# Patient Record
Sex: FEMALE | Race: WHITE | NOT HISPANIC OR LATINO | Employment: FULL TIME | URBAN - NONMETROPOLITAN AREA
[De-identification: names, ages, dates, MRNs, and addresses within clinical notes are randomized per-mention and may not be internally consistent; named-entity substitution may affect disease eponyms.]

---
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
## 2021-02-16 ENCOUNTER — OFFICE VISIT (OUTPATIENT)
Dept: MEDICAL GROUP | Facility: CLINIC | Age: 21
End: 2021-02-16
Payer: COMMERCIAL

## 2021-02-16 VITALS
TEMPERATURE: 99.5 F | RESPIRATION RATE: 18 BRPM | HEIGHT: 62 IN | DIASTOLIC BLOOD PRESSURE: 60 MMHG | SYSTOLIC BLOOD PRESSURE: 98 MMHG | OXYGEN SATURATION: 97 % | HEART RATE: 83 BPM | BODY MASS INDEX: 20.98 KG/M2 | WEIGHT: 114 LBS

## 2021-02-16 DIAGNOSIS — F41.9 ANXIETY: ICD-10-CM

## 2021-02-16 DIAGNOSIS — F32.A MILD DEPRESSION: ICD-10-CM

## 2021-02-16 DIAGNOSIS — F32.0 CURRENT MILD EPISODE OF MAJOR DEPRESSIVE DISORDER, UNSPECIFIED WHETHER RECURRENT (HCC): ICD-10-CM

## 2021-02-16 DIAGNOSIS — Z23 IMMUNIZATION DUE: ICD-10-CM

## 2021-02-16 PROBLEM — R11.0 NAUSEA: Status: RESOLVED | Noted: 2020-10-06 | Resolved: 2021-02-16

## 2021-02-16 PROBLEM — R63.4 WEIGHT LOSS: Status: RESOLVED | Noted: 2020-12-21 | Resolved: 2021-02-16

## 2021-02-16 PROBLEM — N89.8 VAGINAL DISCHARGE: Status: RESOLVED | Noted: 2020-10-28 | Resolved: 2021-02-16

## 2021-02-16 PROBLEM — N92.6 IRREGULAR PERIODS: Status: RESOLVED | Noted: 2020-12-21 | Resolved: 2021-02-16

## 2021-02-16 PROBLEM — L60.0 INGROWN TOENAIL: Status: RESOLVED | Noted: 2020-10-06 | Resolved: 2021-02-16

## 2021-02-16 PROCEDURE — 90621 MENB-FHBP VACC 2/3 DOSE IM: CPT | Performed by: PHYSICIAN ASSISTANT

## 2021-02-16 PROCEDURE — 99212 OFFICE O/P EST SF 10 MIN: CPT | Mod: 25 | Performed by: PHYSICIAN ASSISTANT

## 2021-02-16 PROCEDURE — 90471 IMMUNIZATION ADMIN: CPT | Performed by: PHYSICIAN ASSISTANT

## 2021-02-16 RX ORDER — FLUOXETINE 10 MG/1
10 CAPSULE ORAL DAILY
Qty: 30 CAPSULE | Refills: 1 | Status: SHIPPED | OUTPATIENT
Start: 2021-02-16 | End: 2021-03-02 | Stop reason: SDUPTHER

## 2021-02-16 ASSESSMENT — PATIENT HEALTH QUESTIONNAIRE - PHQ9
CLINICAL INTERPRETATION OF PHQ2 SCORE: 3
SUM OF ALL RESPONSES TO PHQ QUESTIONS 1-9: 9
5. POOR APPETITE OR OVEREATING: 2 - MORE THAN HALF THE DAYS

## 2021-02-16 ASSESSMENT — FIBROSIS 4 INDEX: FIB4 SCORE: 0.37

## 2021-02-16 NOTE — PROGRESS NOTES
Chief Complaint   Patient presents with   • Anxiety     at work she feels overwhelmed and emotional.        HISTORY OF PRESENT ILLNESS: Patient is a 20 y.o. female established patient who presents today to discuss the following issues:    Anxiety  This is a chronic condition for this patient.  She states that it is started to increase in severity and she frequently feels emotional and feels like hiding in her house.  She has seen a counselor in the past for anxiety and depression but has never been put on any medications.  She is willing to see a counselor again but is requesting to try medications in addition.  We will start her on a trial of Fluoxitine 10 mg.  We will follow-up with her in 2 weeks to assess the effectiveness of this medication.  We will decide at that time if dosage needs to be adjusted.  She is going to try and go back and see the counselor that she was seeing before.  If she cannot get back in with her a referral will be placed for psychology for counseling.    Current mild episode of major depressive disorder (HCC)  Patient is currently having mild depression symptoms.  These are in addition to her anxiety.  We are starting her on a trial of fluoxetine 10 mg daily.  We will follow-up with her in 2 weeks for reevaluation.      Patient Active Problem List    Diagnosis Date Noted   • Hair loss 12/21/2020   • Routine screening for STI (sexually transmitted infection) 10/06/2020   • Abnormal WBC count 06/16/2020   • Anxiety 06/04/2020   • Current mild episode of major depressive disorder (HCC) 06/04/2020   • Well adult exam 01/02/2020   • Encounter for surveillance of implantable subdermal contraceptive 07/10/2017       Allergies:Patient has no known allergies.    Current Outpatient Medications   Medication Sig Dispense Refill   • FLUoxetine (PROZAC) 10 MG Cap Take 1 capsule by mouth every day. 30 capsule 1   • etonogestrel (NEXPLANON) 68 MG Implant implant Inject 1 Each as instructed Once.       No  current facility-administered medications for this visit.       Office Visit on 10/28/2020   Component Date Value Ref Range Status   • POC Color 10/28/2020 Yellow  Negative Final   • POC Appearance 10/28/2020 Cloudy  Negative Final   • POC Leukocyte Esterase 10/28/2020 Small  Negative Final   • POC Nitrites 10/28/2020 Neg  Negative Final   • POC Urobiligen 10/28/2020 1.0  Negative (0.2) mg/dL Final   • POC Protein 10/28/2020 30  Negative mg/dL Final   • POC Urine PH 10/28/2020 6.0  5.0 - 8.0 Final   • POC Blood 10/28/2020 Neg  Negative Final   • POC Specific Gravity 10/28/2020 1.025  <1.005 - >1.030 Final   • POC Ketones 10/28/2020 160  Negative mg/dL Final   • POC Bilirubin 10/28/2020 Small  Negative mg/dL Final   • POC Glucose 10/28/2020 Neg  Negative mg/dL Final   Hospital Outpatient Visit on 10/07/2020   Component Date Value Ref Range Status   • C. trachomatis by PCR 10/07/2020 Negative  Negative Final   • N. gonorrhoeae by PCR 10/07/2020 Negative  Negative Final   • Source 10/07/2020 Genital   Final   ]    The ASCVD Risk score (Hollenberg TOBY Jr., et al., 2013) failed to calculate for the following reasons:    The 2013 ASCVD risk score is only valid for ages 40 to 79    Social History     Tobacco Use   • Smoking status: Former Smoker     Packs/day: 1.00     Years: 2.00     Pack years: 2.00     Types: Cigarettes     Start date:      Quit date:      Years since quittin.1   • Smokeless tobacco: Never Used   Substance Use Topics   • Alcohol use: No     Alcohol/week: 0.0 oz   • Drug use: Yes     Frequency: 5.0 times per week     Types: Marijuana, Inhaled     Comment: vape and smoke       Family Status   Relation Name Status   • Mo  Alive        33 in ,  depression   • Fa unknown Alive        43 in    • MGFa  Alive   • Bro  Alive   • MAunt  Alive   • MGMo  Alive        thyroid problems.    • Bro  Alive     Family History   Problem Relation Age of Onset   • Other Mother         migraine headaches   •  Migraines Mother    • Diabetes Maternal Grandfather    • No Known Problems Brother    • Psychiatric Illness Maternal Aunt    • Cancer Maternal Grandmother    • Thyroid Maternal Grandmother    • No Known Problems Brother        No LMP recorded. Patient has had an implant.    Health Maintenance Summary                IMM INFLUENZA Postponed 6/30/2021 Originally 9/1/2020. Patient Refused    IMM MENINGOCOCCAL B VACCINE HEALTHY PATIENTS AGED 16 TO 23 Next Due 8/16/2021      Done 2/16/2021 Imm Admin: MENING VAC SERO B 2-3 DOSE SCHED IM (TRUMENBA)    CHLAMYDIA SCREENING Next Due 10/7/2021      Done 10/7/2020 CHLAMYDIA/GC PCR URINE OR SWAB    IMM DTaP/Tdap/Td Vaccine Next Due 11/5/2022      Done 11/5/2012 Imm Admin: Tdap Vaccine     Patient has more history with this topic...           Review of Systems:   Constitutional: Negative for fever, chills, weight change, fatigue, loss of appetite.  HNT: Negative for nosebleeds, congestion, odynophagia, sore throat or changes in taste.    Eyes: Negative for vision changes.   Ears: Negative for recent changes in hearing, pain or discharge.  Neck: Negative for pain, swelling, lumps or goiter.  Respiratory: Negative for cough, sputum production, shortness of breath and wheezing.    Cardiovascular: Negative for chest pain, palpitations, orthopnea and leg swelling.   Gastrointestinal: Negative for constipation, diarrhea, heartburn, dysphagia, nausea, vomiting or abdominal pain.   Genitourinary: Negative for dysuria, urgency and frequency.   Musculoskeletal: Negative for myalgias, joint pain, and back pain.  Skin: Negative for skin, hair or nail changes, rash, itching.   Neurological: Negative for dizziness, tingling, tremors, sensory change, gait/coordination changes, focal weakness and headaches.   Endo/Heme/Allergies: Does not bruise/bleed easily.   Psychiatric/Behavioral: Positive for anxiety and depression.  Negative for suicidal ideas and memory loss.  The patient does not have  "insomnia.        Exam:  BP (!) 98/60 (BP Location: Right arm, Patient Position: Sitting, BP Cuff Size: Adult)   Pulse 83   Temp 37.5 °C (99.5 °F) (Temporal)   Resp 18   Ht 1.575 m (5' 2\")   Wt 51.7 kg (114 lb)   SpO2 97%  Body mass index is 20.85 kg/m².  General:  Well nourished, well developed female. No apparent distress.  Eyes: EOM intact, PERRL, conjunctiva non-injected, sclera non-icteric.  Ears: Phyllis pinnae, external auditory canals, TM pearly gray with normal light reflex bilaterally.  Neck: Supple with no cervical lymphadenopathy, JVD, palpable thyroid nodules or carotid bruits.  Pulmonary: Clear to ausculation bilaterally. Normal effort. No rales, ronchi, or wheezing.  Cardiovascular: Regular rate and rhythm without murmur, rub or gallop.   Extremities: Full range of motion. Warm and well perfused with no edema.  Skin: Intact with no obvious rashes or lesions.  Neuro: Cranial nerves I-XII intact.  Psych: Alert and oriented x 3.  Appropriately dressed. Mood and affect appropriate.      Assessment/Plan:  1. Anxiety  FLUoxetine (PROZAC) 10 MG Cap   2. Current mild episode of major depressive disorder, unspecified whether recurrent (HCC)  FLUoxetine (PROZAC) 10 MG Cap   3. Immunization due  Meningococcal (IM) Group B   4. Mild depression (HCC)         Reviewed risks and benefits of treatment plan. Patient verbally agrees to plan of care.     Return in about 2 weeks (around 3/2/2021) for f/u medication management.    Please note that this dictation was created using voice recognition software. I have made every reasonable attempt to correct obvious errors, but I expect that there are errors of grammar and possibly content that I did not discover before finalizing the note.  "

## 2021-02-16 NOTE — ASSESSMENT & PLAN NOTE
This is a chronic condition for this patient.  She states that it is started to increase in severity and she frequently feels emotional and feels like hiding in her house.  She has seen a counselor in the past for anxiety and depression but has never been put on any medications.  She is willing to see a counselor again but is requesting to try medications in addition.  We will start her on a trial of Fluoxitine 10 mg.  We will follow-up with her in 2 weeks to assess the effectiveness of this medication.  We will decide at that time if dosage needs to be adjusted.  She is going to try and go back and see the counselor that she was seeing before.  If she cannot get back in with her a referral will be placed for psychology for counseling.

## 2021-02-17 NOTE — ASSESSMENT & PLAN NOTE
Patient is currently having mild depression symptoms.  These are in addition to her anxiety.  We are starting her on a trial of fluoxetine 10 mg daily.  We will follow-up with her in 2 weeks for reevaluation.

## 2021-03-02 ENCOUNTER — OFFICE VISIT (OUTPATIENT)
Dept: MEDICAL GROUP | Facility: CLINIC | Age: 21
End: 2021-03-02
Payer: COMMERCIAL

## 2021-03-02 VITALS
HEIGHT: 62 IN | DIASTOLIC BLOOD PRESSURE: 60 MMHG | TEMPERATURE: 98.6 F | WEIGHT: 114 LBS | HEART RATE: 69 BPM | RESPIRATION RATE: 16 BRPM | OXYGEN SATURATION: 97 % | SYSTOLIC BLOOD PRESSURE: 102 MMHG | BODY MASS INDEX: 20.98 KG/M2

## 2021-03-02 DIAGNOSIS — F41.9 ANXIETY: ICD-10-CM

## 2021-03-02 DIAGNOSIS — Z30.46 ENCOUNTER FOR SURVEILLANCE OF IMPLANTABLE SUBDERMAL CONTRACEPTIVE: ICD-10-CM

## 2021-03-02 DIAGNOSIS — F32.0 CURRENT MILD EPISODE OF MAJOR DEPRESSIVE DISORDER, UNSPECIFIED WHETHER RECURRENT (HCC): ICD-10-CM

## 2021-03-02 PROCEDURE — 99213 OFFICE O/P EST LOW 20 MIN: CPT | Performed by: PHYSICIAN ASSISTANT

## 2021-03-02 RX ORDER — FLUOXETINE 10 MG/1
10 CAPSULE ORAL DAILY
Qty: 90 CAPSULE | Refills: 3 | Status: SHIPPED | OUTPATIENT
Start: 2021-03-02 | End: 2021-06-15

## 2021-03-02 ASSESSMENT — FIBROSIS 4 INDEX: FIB4 SCORE: 0.37

## 2021-03-02 NOTE — PROGRESS NOTES
Chief Complaint   Patient presents with   • Medication Management       HISTORY OF PRESENT ILLNESS: Patient is a 20 y.o. female established patient who presents today to discuss the following issues:    Encounter for surveillance of implantable subdermal contraceptive  Patient currently has a Nexplanon implant for birthcontrol. She states that she is having irregular bleeding and would like to try a different form of birthcontrol. Patient has been on depo-provera in the past with good success. Would like a referral to GYN to discuss options and have the Nexplanon removed. Referral has been placed today.    Current mild episode of major depressive disorder (HCC)  Patient doing well on the fluoxitine. She is still trying to get an appointment to see her counselor that she was seeing previously. She states that she does not need a referral currently but will let us know if she needs to establish with a different provider. She will continue taking this medication as prescribed. We will follow up in 6 months or sooner if she needs.    Anxiety  Patient states that the fluoxetine is working for her. She is still trying to get an appointment with her previous counselor but that she is having better control of her symptoms. She will continue to try to get an appointment and will continue taking this medication as prescribed. She will let us know if they require a new referral. We will see her back in 6 months or sooner if she needs.      Patient Active Problem List    Diagnosis Date Noted   • Encounter for routine gynecological examination with Papanicolaou smear of cervix 03/25/2021   • Hair loss 12/21/2020   • Routine screening for STI (sexually transmitted infection) 10/06/2020   • Abnormal WBC count 06/16/2020   • Anxiety 06/04/2020   • Current mild episode of major depressive disorder (HCC) 06/04/2020   • Well adult exam 01/02/2020   • Encounter for surveillance of implantable subdermal contraceptive 07/10/2017        Allergies:Patient has no known allergies.    Current Outpatient Medications   Medication Sig Dispense Refill   • FLUoxetine (PROZAC) 10 MG Cap Take 1 capsule by mouth every day. 90 capsule 3   • etonogestrel (NEXPLANON) 68 MG Implant implant Inject 1 Each as instructed Once.     • terbinafine (LAMISIL) 250 MG Tab TAKE 1 TABLET BY MOUTH ONCE DAILY FOR 7 DAYS THEN HOLD FOR 3 WEEKS. REPEAT MONTHLY FOR 4 MONTHS.       No current facility-administered medications for this visit.       Office Visit on 10/28/2020   Component Date Value Ref Range Status   • POC Color 10/28/2020 Yellow  Negative Final   • POC Appearance 10/28/2020 Cloudy  Negative Final   • POC Leukocyte Esterase 10/28/2020 Small  Negative Final   • POC Nitrites 10/28/2020 Neg  Negative Final   • POC Urobiligen 10/28/2020 1.0  Negative (0.2) mg/dL Final   • POC Protein 10/28/2020 30  Negative mg/dL Final   • POC Urine PH 10/28/2020 6.0  5.0 - 8.0 Final   • POC Blood 10/28/2020 Neg  Negative Final   • POC Specific Gravity 10/28/2020 1.025  <1.005 - >1.030 Final   • POC Ketones 10/28/2020 160  Negative mg/dL Final   • POC Bilirubin 10/28/2020 Small  Negative mg/dL Final   • POC Glucose 10/28/2020 Neg  Negative mg/dL Final   Hospital Outpatient Visit on 10/07/2020   Component Date Value Ref Range Status   • C. trachomatis by PCR 10/07/2020 Negative  Negative Final   • N. gonorrhoeae by PCR 10/07/2020 Negative  Negative Final   • Source 10/07/2020 Genital   Final   ]    The ASCVD Risk score (Young DC Jr., et al., 2013) failed to calculate for the following reasons:    The 2013 ASCVD risk score is only valid for ages 40 to 79    Social History     Tobacco Use   • Smoking status: Former Smoker     Packs/day: 1.00     Years: 2.00     Pack years: 2.00     Types: Cigarettes     Start date:      Quit date: 2020     Years since quittin.2   • Smokeless tobacco: Never Used   Substance Use Topics   • Alcohol use: No     Alcohol/week: 0.0 oz   • Drug use: Yes      Frequency: 5.0 times per week     Types: Marijuana, Inhaled     Comment: vape and smoke       Family Status   Relation Name Status   • Mo  Alive        33 in 2014,  depression   • Fa unknown Alive        43 in 2014   • MGFa  Alive   • Bro  Alive   • MAunt  Alive   • MGMo  Alive        thyroid problems.    • Bro  Alive     Family History   Problem Relation Age of Onset   • Other Mother         migraine headaches   • Migraines Mother    • Diabetes Maternal Grandfather    • No Known Problems Brother    • Psychiatric Illness Maternal Aunt    • Cancer Maternal Grandmother    • Thyroid Maternal Grandmother    • No Known Problems Brother        No LMP recorded. Patient has had an implant.    Health Maintenance Summary                COVID-19 Vaccine Overdue 8/24/2017     IMM MENINGOCOCCAL B VACCINE HEALTHY PATIENTS AGED 16 TO 23 Next Due 8/16/2021      Done 2/16/2021 Imm Admin: MENING VAC SERO B 2-3 DOSE SCHED IM (TRUMENBA)    IMM INFLUENZA Next Due 9/1/2021     CHLAMYDIA SCREENING Next Due 10/7/2021      Done 10/7/2020 CHLAMYDIA/GC PCR URINE OR SWAB    IMM DTaP/Tdap/Td Vaccine Next Due 11/5/2022      Done 11/5/2012 Imm Admin: Tdap Vaccine     Patient has more history with this topic...           Review of Systems:   Constitutional: Negative for fever, chills, weight change, fatigue, loss of appetite.  HNT: Negative for nosebleeds, congestion, odynophagia, sore throat or changes in taste.    Eyes: Negative for vision changes.   Ears: Negative for recent changes in hearing, pain or discharge.  Neck: Negative for pain, swelling, lumps or goiter.  Respiratory: Negative for cough, sputum production, shortness of breath and wheezing.    Cardiovascular: Negative for chest pain, palpitations, orthopnea and leg swelling.   Gastrointestinal: Negative for constipation, diarrhea, heartburn, dysphagia, nausea, vomiting or abdominal pain.   Genitourinary: Negative for dysuria, urgency and frequency. Posititve for irregular  "bleeding.  Musculoskeletal: Negative for myalgias, joint pain, and back pain.  Skin: Negative for skin, hair or nail changes, rash, itching.   Neurological: Negative for dizziness, tingling, tremors, sensory change, gait/coordination changes, focal weakness and headaches.   Endo/Heme/Allergies: Does not bruise/bleed easily.   Psychiatric/Behavioral: Positive for anxiety, depression. Negative for suicidal ideas and memory loss.  The patient does not have insomnia.        Exam:  /60 (BP Location: Right arm, Patient Position: Sitting, BP Cuff Size: Small adult)   Pulse 69   Temp 37 °C (98.6 °F) (Temporal)   Resp 16   Ht 1.575 m (5' 2\")   Wt 51.7 kg (114 lb)   SpO2 97%  Body mass index is 20.85 kg/m².  General:  Well nourished, well developed female. No apparent distress.  Eyes: EOM intact, PERRL, conjunctiva non-injected, sclera non-icteric.  Ears: Phyllis pinnae, external auditory canals, TM pearly gray with normal light reflex bilaterally.  Neck: Supple with no cervical lymphadenopathy, JVD, palpable thyroid nodules or carotid bruits.  Pulmonary: Clear to ausculation bilaterally. Normal effort. No rales, ronchi, or wheezing.  Cardiovascular: Regular rate and rhythm without murmur, rub or gallop.   Abdomen:  Soft, non-distended, non-tender with normal bowel sounds. No CVA tenderness  Extremities: Full range of motion. Warm and well perfused with no edema.  Skin: Intact with no obvious rashes or lesions.  Neuro: Cranial nerves I-XII intact.  Psych: Alert and oriented x 3.  Appropriately dressed. Mood and affect appropriate.      Assessment/Plan:  1. Encounter for surveillance of implantable subdermal contraceptive  REFERRAL TO GYNECOLOGY   2. Anxiety  FLUoxetine (PROZAC) 10 MG Cap   3. Current mild episode of major depressive disorder, unspecified whether recurrent (HCC)  FLUoxetine (PROZAC) 10 MG Cap       Reviewed risks and benefits of treatment plan. Patient verbally agrees to plan of care.     Return in " about 6 months (around 9/2/2021).    Please note that this dictation was created using voice recognition software. I have made every reasonable attempt to correct obvious errors, but I expect that there are errors of grammar and possibly content that I did not discover before finalizing the note.

## 2021-03-25 ENCOUNTER — HOSPITAL ENCOUNTER (OUTPATIENT)
Facility: MEDICAL CENTER | Age: 21
End: 2021-03-25
Attending: PHYSICIAN ASSISTANT
Payer: COMMERCIAL

## 2021-03-25 ENCOUNTER — OFFICE VISIT (OUTPATIENT)
Dept: MEDICAL GROUP | Facility: CLINIC | Age: 21
End: 2021-03-25
Payer: COMMERCIAL

## 2021-03-25 VITALS
WEIGHT: 117 LBS | DIASTOLIC BLOOD PRESSURE: 70 MMHG | OXYGEN SATURATION: 98 % | TEMPERATURE: 98.1 F | BODY MASS INDEX: 20.73 KG/M2 | HEART RATE: 78 BPM | RESPIRATION RATE: 16 BRPM | HEIGHT: 63 IN | SYSTOLIC BLOOD PRESSURE: 110 MMHG

## 2021-03-25 DIAGNOSIS — Z01.419 ENCOUNTER FOR ROUTINE GYNECOLOGICAL EXAMINATION WITH PAPANICOLAOU SMEAR OF CERVIX: ICD-10-CM

## 2021-03-25 PROCEDURE — 88175 CYTOPATH C/V AUTO FLUID REDO: CPT

## 2021-03-25 PROCEDURE — 99408 AUDIT/DAST 15-30 MIN: CPT | Mod: SBIRT | Performed by: PHYSICIAN ASSISTANT

## 2021-03-25 PROCEDURE — 99395 PREV VISIT EST AGE 18-39: CPT | Performed by: PHYSICIAN ASSISTANT

## 2021-03-25 PROCEDURE — 87624 HPV HI-RISK TYP POOLED RSLT: CPT

## 2021-03-25 RX ORDER — TERBINAFINE HYDROCHLORIDE 250 MG/1
TABLET ORAL
COMMUNITY
Start: 2021-03-17 | End: 2021-09-12

## 2021-03-25 ASSESSMENT — FIBROSIS 4 INDEX: FIB4 SCORE: 0.37

## 2021-03-25 ASSESSMENT — LIFESTYLE VARIABLES
DO YOU EVER FORGET THINGS YOU DID WHILE USING ALCOHOL OR DRUGS: NO
DURING THE PAST 12 MONTHS, ON HOW MANY DAYS DID YOU USE ANY TOBACCO OR NICOTINE PRODUCTS: 365
PART A TOTAL SCORE: 732
HAVE YOU EVER GOTTEN IN TROUBLE WHILE YOU WERE USING ALCOHOL OR DRUGS: NO
DURING THE PAST 12 MONTHS, ON HOW MANY DAYS DID YOU USE ANYTHING ELSE TO GET HIGH: 0
DO YOU EVER USE ALCOHOL OR DRUGS TO RELAX, FEEL BETTER ABOUT YOURSELF, OR FIT IN: NO
DO YOU EVER USE ALCOHOL OR DRUGS WHILE YOU ARE BY YOURSELF ALONE: YES
DURING THE PAST 12 MONTHS, ON HOW MANY DAYS DID YOU USE ANY MARIJUANA: 365
DO YOUR FAMILY OR FRIENDS EVER TELL YOU THAT YOU SHOULD CUT DOWN ON YOUR DRINKING OR DRUG USE: NO
HAVE YOU EVER RIDDEN IN A CAR DRIVEN BY SOMEONE WHO WAS HIGH OR HAD BEEN USING ALCOHOL OR DRUGS: NO
DURING THE PAST 12 MONTHS, ON HOW MANY DAYS DID YOU DRINK MORE THAN A FEW SIPS OF BEER, WINE, OR ANY DRINK CONTAINING ALCOHOL: 2

## 2021-03-25 NOTE — PROGRESS NOTES
SUBJECTIVE:   Chief Complaint   Patient presents with   • Gynecologic Exam       20 y.o. female for annual well woman exam with routine gynecologic exam    OB History    Para Term  AB Living   0 0 0 0 0 0   SAB TAB Ectopic Molar Multiple Live Births   0 0 0 0 0 0      Social History     Substance and Sexual Activity   Sexual Activity Not Currently   • Partners: Male   • Birth control/protection: Implant       Last Pap: Never  H/O Abnormal Pap no  Menses every monthly with spotting bleeding.  She reports mild cramping   She denies pelvic pain or dyspareunia. No abnormal vaginal discharge.    No breast tenderness, mass, nipple discharge, changes in size or contour, or abnormal cyclic discomfort.    ROS:    No urinary tract symptoms, no incontinence.   No abdominal pain, change in bowel habits, black or bloody stools.    No unusual headaches, no visual changes, menstrual migraines   No prolonged cough. No dyspnea or chest pain on exertion.  No depression, labile mood, anxiety ,libido changes, insomnia.  No new/concerning skin lesions, concerns.     Exercise: minimal exercise walking  Diet: fairly healthy, but only eats twice a day.    Social History     Tobacco Use   • Smoking status: Former Smoker     Packs/day: 1.00     Years: 2.00     Pack years: 2.00     Types: Cigarettes     Start date:      Quit date:      Years since quittin.2   • Smokeless tobacco: Never Used   Substance Use Topics   • Alcohol use: No     Alcohol/week: 0.0 oz   • Drug use: Yes     Frequency: 5.0 times per week     Types: Marijuana, Inhaled     Comment: vape and smoke       Patient Active Problem List    Diagnosis Date Noted   • Encounter for routine gynecological examination with Papanicolaou smear of cervix 2021   • Hair loss 2020   • Routine screening for STI (sexually transmitted infection) 10/06/2020   • Abnormal WBC count 2020   • Anxiety 2020   • Current mild episode of major depressive  "disorder (HCC) 06/04/2020   • Well adult exam 01/02/2020   • Encounter for surveillance of implantable subdermal contraceptive 07/10/2017       Past Medical History:   Diagnosis Date   • Anxiety    • Depression    • Head ache    • Heart murmur     as a baby   • Ingrown toenail 10/6/2020   • Irregular periods 12/21/2020   • Menarche 8/9/2014   • Nausea 10/6/2020   • Vaginal discharge 10/28/2020   • Weight loss 12/21/2020     No past surgical history on file.      Family History   Problem Relation Age of Onset   • Other Mother         migraine headaches   • Migraines Mother    • Diabetes Maternal Grandfather    • No Known Problems Brother    • Psychiatric Illness Maternal Aunt    • Cancer Maternal Grandmother    • Thyroid Maternal Grandmother    • No Known Problems Brother      Family Status   Relation Name Status   • Mo  Alive        33 in 2014,  depression   • Fa unknown Alive        43 in 2014   • MGFa  Alive   • Bro  Alive   • MAunt  Alive   • MGMo  Alive        thyroid problems.    • Bro  Alive         Current medicines (including changes today)  Current Outpatient Medications   Medication Sig Dispense Refill   • FLUoxetine (PROZAC) 10 MG Cap Take 1 capsule by mouth every day. 90 capsule 3   • etonogestrel (NEXPLANON) 68 MG Implant implant Inject 1 Each as instructed Once.     • terbinafine (LAMISIL) 250 MG Tab TAKE 1 TABLET BY MOUTH ONCE DAILY FOR 7 DAYS THEN HOLD FOR 3 WEEKS. REPEAT MONTHLY FOR 4 MONTHS.       No current facility-administered medications for this visit.           Allergies: Patient has no known allergies.     Preventive Care:  The patient has no Health Maintenance topics of status Overdue, Due On, or Due Soon    OBJECTIVE:   /70 (BP Location: Left arm, Patient Position: Sitting, BP Cuff Size: Adult)   Pulse 78   Temp 36.7 °C (98.1 °F) (Temporal)   Resp 16   Ht 1.6 m (5' 3\")   Wt 53.1 kg (117 lb)   SpO2 98%   BMI 20.73 kg/m²     A chaperone was offered to the patient during today's " exam. Patient declined chaperone.    Gen: Well developed, well nourished in no acute distress.   Skin: Pink, warm, and dry  HEENT: conjunctiva non-injected, sclera non-icteric. EOMs intact.   Nasal mucosa without edema nor erythema. No facial tenderness  Pinna normal. TM pearly gray.   Oral mucous membranes pink and moist with no lesions.  Neck: Supple, trachea midline. No adenopathy or masses in the neck or supraclavicular regions.  Lungs: Effort is normal. Clear to auscultation bilaterally with good excursion.  CV: regular rate and rhythm.  Abdomen: soft, nontender, + BS. No HSM.  No CVAT  Ext: no edema, color normal, vascularity normal, temperature normal  Alert and oriented Eye contact is good, speech goal directed, affect calm     Breast Exam: Performed with instruction during examination. No axillary lymphadenopathy, no skin changes, no dominant masses. No nipple retraction  Pelvic Exam:  Normal external genitalia with no lesions. Normal vaginal mucosa with normal rugation and scant discharge. Cervix with no visible lesions. No cervical motion tenderness. Uterus is normal sized with no masses. No adnexal tenderness or enlargement appreciated. Pap is obtained and the specimen was sent to lab      <ASSESSMENT and PLAN>  1. Encounter for routine gynecological examination with Papanicolaou smear of cervix  - THINPREP PAP WITH HPV; Future    Other orders  - terbinafine (LAMISIL) 250 MG Tab; TAKE 1 TABLET BY MOUTH ONCE DAILY FOR 7 DAYS THEN HOLD FOR 3 WEEKS. REPEAT MONTHLY FOR 4 MONTHS.        Discussed  breast self exam, STD prevention     Follow-up in 3 years for next Gyn exam and 3 years for next Pap.   No follow-ups on file.

## 2021-03-25 NOTE — ASSESSMENT & PLAN NOTE
"Patient requested to have PAP done, even though it is early for a first PAP. Patient states that her previous partner was unfaithful and she is making sure that everything is fine and he \"did not give her anything\".  "

## 2021-03-26 DIAGNOSIS — Z01.419 ENCOUNTER FOR ROUTINE GYNECOLOGICAL EXAMINATION WITH PAPANICOLAOU SMEAR OF CERVIX: ICD-10-CM

## 2021-03-26 LAB
CYTOLOGY REG CYTOL: NORMAL
HPV HR 12 DNA CVX QL NAA+PROBE: NEGATIVE
HPV16 DNA SPEC QL NAA+PROBE: NEGATIVE
HPV18 DNA SPEC QL NAA+PROBE: NEGATIVE
SPECIMEN SOURCE: NORMAL

## 2021-04-13 NOTE — ASSESSMENT & PLAN NOTE
Patient doing well on the fluoxitine. She is still trying to get an appointment to see her counselor that she was seeing previously. She states that she does not need a referral currently but will let us know if she needs to establish with a different provider. She will continue taking this medication as prescribed. We will follow up in 6 months or sooner if she needs.

## 2021-04-13 NOTE — ASSESSMENT & PLAN NOTE
Patient currently has a Nexplanon implant for birthcontrol. She states that she is having irregular bleeding and would like to try a different form of birthcontrol. Patient has been on depo-provera in the past with good success. Would like a referral to GYN to discuss options and have the Nexplanon removed. Referral has been placed today.

## 2021-04-13 NOTE — ASSESSMENT & PLAN NOTE
Patient states that the fluoxetine is working for her. She is still trying to get an appointment with her previous counselor but that she is having better control of her symptoms. She will continue to try to get an appointment and will continue taking this medication as prescribed. She will let us know if they require a new referral. We will see her back in 6 months or sooner if she needs.

## 2021-06-15 ENCOUNTER — OFFICE VISIT (OUTPATIENT)
Dept: MEDICAL GROUP | Facility: CLINIC | Age: 21
End: 2021-06-15
Payer: COMMERCIAL

## 2021-06-15 VITALS
TEMPERATURE: 98.4 F | BODY MASS INDEX: 20.2 KG/M2 | HEIGHT: 63 IN | SYSTOLIC BLOOD PRESSURE: 112 MMHG | WEIGHT: 114 LBS | OXYGEN SATURATION: 100 % | DIASTOLIC BLOOD PRESSURE: 60 MMHG | HEART RATE: 87 BPM

## 2021-06-15 DIAGNOSIS — F32.0 CURRENT MILD EPISODE OF MAJOR DEPRESSIVE DISORDER, UNSPECIFIED WHETHER RECURRENT (HCC): ICD-10-CM

## 2021-06-15 DIAGNOSIS — F41.9 ANXIETY: ICD-10-CM

## 2021-06-15 PROCEDURE — 99214 OFFICE O/P EST MOD 30 MIN: CPT | Performed by: PHYSICIAN ASSISTANT

## 2021-06-15 RX ORDER — FLUOXETINE HYDROCHLORIDE 20 MG/1
20 CAPSULE ORAL DAILY
Qty: 60 CAPSULE | Refills: 1 | Status: SHIPPED | OUTPATIENT
Start: 2021-06-15 | End: 2021-09-12

## 2021-06-15 ASSESSMENT — ANXIETY QUESTIONNAIRES
GAD7 TOTAL SCORE: 9
2. NOT BEING ABLE TO STOP OR CONTROL WORRYING: MORE THAN HALF THE DAYS
5. BEING SO RESTLESS THAT IT IS HARD TO SIT STILL: SEVERAL DAYS
IF YOU CHECKED OFF ANY PROBLEMS ON THIS QUESTIONNAIRE, HOW DIFFICULT HAVE THESE PROBLEMS MADE IT FOR YOU TO DO YOUR WORK, TAKE CARE OF THINGS AT HOME, OR GET ALONG WITH OTHER PEOPLE: SOMEWHAT DIFFICULT
1. FEELING NERVOUS, ANXIOUS, OR ON EDGE: SEVERAL DAYS
6. BECOMING EASILY ANNOYED OR IRRITABLE: SEVERAL DAYS
3. WORRYING TOO MUCH ABOUT DIFFERENT THINGS: MORE THAN HALF THE DAYS
7. FEELING AFRAID AS IF SOMETHING AWFUL MIGHT HAPPEN: SEVERAL DAYS
4. TROUBLE RELAXING: SEVERAL DAYS

## 2021-06-15 ASSESSMENT — PATIENT HEALTH QUESTIONNAIRE - PHQ9
SUM OF ALL RESPONSES TO PHQ QUESTIONS 1-9: 13
5. POOR APPETITE OR OVEREATING: 3 - NEARLY EVERY DAY
CLINICAL INTERPRETATION OF PHQ2 SCORE: 3

## 2021-06-15 ASSESSMENT — FIBROSIS 4 INDEX: FIB4 SCORE: 0.37

## 2021-06-16 NOTE — ASSESSMENT & PLAN NOTE
Patient was started on fluoxetine 10 mg a few months ago. She states that she was only noticing a slight difference when taking it. She was supposed to follow up 2 weeks after starting the medication for dose adjustment but she failed to make that appointment. She states that she missed a dose and was not sure what to do so she quit taking the medication.  She is here today with a recurrence of her symptoms. We will start her on 20 mg fluoxetine since she was able to handle the 10 mg without an increase in her symptoms. She has an appointment scheduled for 2 weeks for reevaluation.

## 2021-07-01 ENCOUNTER — TELEPHONE (OUTPATIENT)
Dept: MEDICAL GROUP | Facility: CLINIC | Age: 21
End: 2021-07-01

## 2021-07-01 NOTE — TELEPHONE ENCOUNTER
Phone Number Called: [phone] (home)     Call outcome: Number disconnected    Message: No Show Policy was not reviewed    Brandie Hunter No Showed a established apt on 7/1/21 with Wendy Hillman P.A.-C..     NO Brandie Hunter was reminded of appointment.

## 2021-09-12 ENCOUNTER — OFFICE VISIT (OUTPATIENT)
Dept: URGENT CARE | Facility: PHYSICIAN GROUP | Age: 21
End: 2021-09-12
Payer: COMMERCIAL

## 2021-09-12 ENCOUNTER — APPOINTMENT (OUTPATIENT)
Dept: RADIOLOGY | Facility: IMAGING CENTER | Age: 21
End: 2021-09-12
Attending: PHYSICIAN ASSISTANT
Payer: COMMERCIAL

## 2021-09-12 VITALS
DIASTOLIC BLOOD PRESSURE: 60 MMHG | HEIGHT: 63 IN | TEMPERATURE: 97.5 F | BODY MASS INDEX: 22.5 KG/M2 | OXYGEN SATURATION: 95 % | WEIGHT: 127 LBS | HEART RATE: 100 BPM | SYSTOLIC BLOOD PRESSURE: 98 MMHG

## 2021-09-12 DIAGNOSIS — R10.30 LOWER ABDOMINAL PAIN: ICD-10-CM

## 2021-09-12 DIAGNOSIS — R11.2 NAUSEA VOMITING AND DIARRHEA: ICD-10-CM

## 2021-09-12 DIAGNOSIS — R19.7 NAUSEA VOMITING AND DIARRHEA: ICD-10-CM

## 2021-09-12 PROCEDURE — 99213 OFFICE O/P EST LOW 20 MIN: CPT | Performed by: PHYSICIAN ASSISTANT

## 2021-09-12 PROCEDURE — 74019 RADEX ABDOMEN 2 VIEWS: CPT | Mod: TC,FY | Performed by: PHYSICIAN ASSISTANT

## 2021-09-12 ASSESSMENT — FIBROSIS 4 INDEX: FIB4 SCORE: 0.39

## 2021-09-12 NOTE — PROGRESS NOTES
Chief Complaint   Patient presents with   • Abdominal Pain     x 3 days, tenderness lower area.       HISTORY OF PRESENT ILLNESS: Patient is a 21 y.o. female who presents today for the following:       Patient comes in for evaluation of lower abdominal pain for the past 3 days.  She has had watery stool, nausea, vomiting.  She has a Nexplanon in place.  She denies fever, chills, body aches.    Patient Active Problem List    Diagnosis Date Noted   • Encounter for routine gynecological examination with Papanicolaou smear of cervix 03/25/2021   • Hair loss 12/21/2020   • Routine screening for STI (sexually transmitted infection) 10/06/2020   • Abnormal WBC count 06/16/2020   • Anxiety 06/04/2020   • Current mild episode of major depressive disorder (HCC) 06/04/2020   • Well adult exam 01/02/2020   • Encounter for surveillance of implantable subdermal contraceptive 07/10/2017       Allergies:Patient has no known allergies.    Current Outpatient Medications Ordered in Epic   Medication Sig Dispense Refill   • FLUoxetine (PROZAC) 20 MG Cap Take 1 capsule by mouth every day. (Patient not taking: Reported on 9/12/2021) 60 capsule 1   • terbinafine (LAMISIL) 250 MG Tab TAKE 1 TABLET BY MOUTH ONCE DAILY FOR 7 DAYS THEN HOLD FOR 3 WEEKS. REPEAT MONTHLY FOR 4 MONTHS. (Patient not taking: Reported on 9/12/2021)     • etonogestrel (NEXPLANON) 68 MG Implant implant Inject 1 Each as instructed Once. (Patient not taking: Reported on 9/12/2021)       No current [facility]-ordered facility-administered medications on file.       Past Medical History:   Diagnosis Date   • Anxiety    • Depression    • Head ache    • Heart murmur     as a baby   • Ingrown toenail 10/6/2020   • Irregular periods 12/21/2020   • Menarche 8/9/2014   • Nausea 10/6/2020   • Vaginal discharge 10/28/2020   • Weight loss 12/21/2020       Social History     Tobacco Use   • Smoking status: Former Smoker     Packs/day: 1.00     Years: 2.00     Pack years: 2.00     Types:  "Cigarettes     Start date:      Quit date: 2020     Years since quittin.6   • Smokeless tobacco: Never Used   Vaping Use   • Vaping Use: Every day   • Start date: 2017   • Substances: Nicotine, THC, Flavoring, Nicotine - 50 mg?   • Devices: Refillable tank   Substance Use Topics   • Alcohol use: No     Alcohol/week: 0.0 oz   • Drug use: Yes     Frequency: 5.0 times per week     Types: Marijuana, Inhaled     Comment: vape and smoke       Family Status   Relation Name Status   • Mo  Alive        33 in ,  depression   • Fa unknown Alive        43 in    • MGFa  Alive   • Bro  Alive   • MAunt  Alive   • MGMo  Alive        thyroid problems.    • Bro  Alive     Family History   Problem Relation Age of Onset   • Other Mother         migraine headaches   • Migraines Mother    • Diabetes Maternal Grandfather    • No Known Problems Brother    • Psychiatric Illness Maternal Aunt    • Cancer Maternal Grandmother    • Thyroid Maternal Grandmother    • No Known Problems Brother        Review of Systems:   Pertinent systems reviewed with the patient.      Exam:  BP (!) 98/60 (BP Location: Left arm, Patient Position: Sitting, BP Cuff Size: Adult)   Pulse 100   Temp 36.4 °C (97.5 °F) (Temporal)   Ht 1.6 m (5' 3\")   Wt 57.6 kg (127 lb)   SpO2 95%   General: Well developed, well nourished. No distress.    Eye: PERRL/EOMI; conjunctivae clear, lids normal.  ENMT: Lips without lesions, MMM. Oropharynx is clear. Bilateral TMs are within normal limits.  Pulmonary: Unlabored respiratory effort. Lungs clear to auscultation, no wheezes, no rhonchi.    Cardiovascular: Regular rate and rhythm without murmur.   Neurologic: Grossly nonfocal. No facial asymmetry noted.  Lymph: No cervical lymphadenopathy noted.  Skin: Warm, dry, good turgor. No rashes in visible areas.   Psych: Normal mood. Alert and oriented to person, place and time.    UA, small blood, trace protein, otherwise negative  Urine hCG: Negative    2 view " abdomen, per radiology:  Normal two views of the abdomen.    Assessment/Plan:  [place] diet.  Drink plenty fluids.  Discussed appropriate over-the-counter symptomatic medication, and when to return to clinic. Follow up for worsening or persistent symptoms.  1. Lower abdominal pain  [id number] VIEWS   2. Nausea vomiting and diarrhea

## 2021-12-01 ENCOUNTER — OFFICE VISIT (OUTPATIENT)
Dept: MEDICAL GROUP | Facility: CLINIC | Age: 21
End: 2021-12-01
Payer: COMMERCIAL

## 2021-12-01 VITALS
BODY MASS INDEX: 22.15 KG/M2 | HEIGHT: 63 IN | OXYGEN SATURATION: 96 % | HEART RATE: 84 BPM | DIASTOLIC BLOOD PRESSURE: 60 MMHG | TEMPERATURE: 98.1 F | RESPIRATION RATE: 16 BRPM | SYSTOLIC BLOOD PRESSURE: 108 MMHG | WEIGHT: 125 LBS

## 2021-12-01 DIAGNOSIS — J30.89 ENVIRONMENTAL AND SEASONAL ALLERGIES: ICD-10-CM

## 2021-12-01 DIAGNOSIS — R09.81 NASAL CONGESTION: ICD-10-CM

## 2021-12-01 PROCEDURE — 99213 OFFICE O/P EST LOW 20 MIN: CPT | Performed by: PHYSICIAN ASSISTANT

## 2021-12-01 ASSESSMENT — FIBROSIS 4 INDEX: FIB4 SCORE: 0.39

## 2021-12-02 NOTE — PATIENT INSTRUCTIONS
Vitamin D3 1000 units daily  Zyrtec or cetirizine - 1 tablet nightly for allergies  Saline nasal spray for moisture

## 2021-12-15 ASSESSMENT — ENCOUNTER SYMPTOMS
CARDIOVASCULAR NEGATIVE: 1
SORE THROAT: 0
MUSCULOSKELETAL NEGATIVE: 1
WHEEZING: 0
SINUS PAIN: 1
COUGH: 0
SPUTUM PRODUCTION: 0
SHORTNESS OF BREATH: 0
EYES NEGATIVE: 1
CONSTITUTIONAL NEGATIVE: 1
RESPIRATORY NEGATIVE: 1
DEPRESSION: 1
GASTROINTESTINAL NEGATIVE: 1
NEUROLOGICAL NEGATIVE: 1

## 2021-12-15 ASSESSMENT — VISUAL ACUITY: OU: 1

## 2021-12-15 NOTE — PROGRESS NOTES
Subjective   Brandie Hunter is a 21 y.o. female who presents with Sinus Problem, Nevus (on back ), and Requesting Labs (thyroid )    1. Environmental and seasonal allergies  This is a chronic condition for this patient.  She currently only takes an occasional Benadryl to try and help control her symptoms.  Most of the time she tries to suffer through them. I have recommended that she try cetirizine 1 tablet nightly for her allergy symptoms.  She is having sinus congestion but her mucus is clear.  It is most likely allergic in nature.  I have also recommended that she use saline nasal spray for moisture.  I have recommended that she spray her nasal passages and wait 5 minutes before blowing her nose.  This gives the saline spray a chance to soften up secretions so that she can more easily blow them out.  She will follow-up with us in 1 week should her symptoms not improve or worsen.  Otherwise we will follow-up with her in 3 months.    2. Nasal congestion  Chronic congestion secondary to seasonal and environmental allergies.  I have recommended that she take one of the long acting antihistamines.  She was concerned about drowsiness but I have assured her that these medications are nondrowsy.  She is willing to try them in follow-up.    Past Medical History:  No date: Anxiety  No date: Depression  No date: Head ache  No date: Heart murmur      Comment:  as a baby  10/6/2020: Ingrown toenail  2020: Irregular periods  2014: Menarche  10/6/2020: Nausea  10/28/2020: Vaginal discharge  2020: Weight loss  History reviewed. No pertinent surgical history.  Social History    Tobacco Use      Smoking status: Former Smoker        Packs/day: 1.00        Years: 2.00        Pack years: 2        Types: Cigarettes        Start date:         Quit date: 2020        Years since quittin.9      Smokeless tobacco: Never Used    Vaping Use      Vaping Use: Every day        Start date: 2017        Substances:  Nicotine, THC, Flavoring, Nicotine - 50 mg?        Devices: Refillable tank    Alcohol use: No      Alcohol/week: 0.0 oz    Drug use: Yes      Frequency: 5.0 times per week      Types: Marijuana, Inhaled      Comment: vape and smoke    Review of patient's family history indicates:  Problem: Other      Relation: Mother          Age of Onset: (Not Specified)          Comment: migraine headaches  Problem: Migraines      Relation: Mother          Age of Onset: (Not Specified)  Problem: Diabetes      Relation: Maternal Grandfather          Age of Onset: (Not Specified)  Problem: No Known Problems      Relation: Brother          Age of Onset: (Not Specified)  Problem: Psychiatric Illness      Relation: Maternal Aunt          Age of Onset: (Not Specified)  Problem: Cancer      Relation: Maternal Grandmother          Age of Onset: (Not Specified)  Problem: Thyroid      Relation: Maternal Grandmother          Age of Onset: (Not Specified)  Problem: No Known Problems      Relation: Brother          Age of Onset: (Not Specified)      No current outpatient medications on file.    Patient was instructed on the use of medications, either prescriptions or OTC and informed on when the appropriate follow up time period should be. In addition, patient was also instructed that should any acute worsening occur that they should notify this clinic asap or call 911.      Review of Systems   Constitutional: Negative.    HENT: Positive for congestion, nosebleeds and sinus pain. Negative for ear pain and sore throat.    Eyes: Negative.    Respiratory: Negative.  Negative for cough, sputum production, shortness of breath and wheezing.    Cardiovascular: Negative.    Gastrointestinal: Negative.    Genitourinary: Negative.    Musculoskeletal: Negative.    Skin: Negative.    Neurological: Negative.    Endo/Heme/Allergies: Negative.    Psychiatric/Behavioral: Positive for depression.     Objective     /60 (BP Location: Right arm, Patient  "Position: Sitting, BP Cuff Size: Adult)   Pulse 84   Temp 36.7 °C (98.1 °F) (Temporal)   Resp 16   Ht 1.6 m (5' 3\") Comment: obtained from chart  Wt 56.7 kg (125 lb) Comment: with shoes on  LMP 11/10/2021 (Approximate)   SpO2 96%   BMI 22.14 kg/m²      Physical Exam  Vitals and nursing note reviewed.   Constitutional:       Appearance: Normal appearance. She is well-developed and well-groomed.   HENT:      Head: Normocephalic and atraumatic.      Nose: Mucosal edema, congestion and rhinorrhea present. Rhinorrhea is clear.      Right Turbinates: Enlarged and pale.      Left Turbinates: Enlarged and pale.      Mouth/Throat:      Lips: Pink. No lesions.      Mouth: Mucous membranes are moist.   Eyes:      General: Lids are normal. Vision grossly intact. Gaze aligned appropriately.      Extraocular Movements: Extraocular movements intact.      Conjunctiva/sclera: Conjunctivae normal.      Pupils: Pupils are equal, round, and reactive to light.   Neck:      Thyroid: No thyromegaly.      Vascular: No carotid bruit or JVD.      Trachea: Trachea and phonation normal.   Cardiovascular:      Rate and Rhythm: Normal rate and regular rhythm.      Heart sounds: Normal heart sounds. No murmur heard.  No friction rub. No gallop.    Pulmonary:      Effort: Pulmonary effort is normal.      Breath sounds: Normal breath sounds. No wheezing, rhonchi or rales.   Musculoskeletal:         General: Normal range of motion.      Cervical back: Normal range of motion and neck supple.      Right lower leg: No edema.      Left lower leg: No edema.   Lymphadenopathy:      Cervical: No cervical adenopathy.   Skin:     General: Skin is warm and dry.      Capillary Refill: Capillary refill takes less than 2 seconds.      Findings: No lesion or rash.   Neurological:      Mental Status: She is alert and oriented to person, place, and time.      Cranial Nerves: Cranial nerves are intact.   Psychiatric:         Attention and Perception: " Attention and perception normal.         Mood and Affect: Affect normal. Mood is depressed.         Speech: Speech normal.         Behavior: Behavior normal. Behavior is cooperative.         Thought Content: Thought content normal.         Judgment: Judgment normal.       Assessment & Plan      1. Environmental and seasonal allergies    2. Nasal congestion

## 2021-12-22 ENCOUNTER — HOSPITAL ENCOUNTER (OUTPATIENT)
Facility: MEDICAL CENTER | Age: 21
End: 2021-12-22
Attending: PHYSICIAN ASSISTANT
Payer: COMMERCIAL

## 2021-12-22 ENCOUNTER — OFFICE VISIT (OUTPATIENT)
Dept: MEDICAL GROUP | Facility: CLINIC | Age: 21
End: 2021-12-22
Payer: COMMERCIAL

## 2021-12-22 VITALS
HEART RATE: 102 BPM | BODY MASS INDEX: 21.65 KG/M2 | HEIGHT: 63 IN | DIASTOLIC BLOOD PRESSURE: 70 MMHG | OXYGEN SATURATION: 96 % | TEMPERATURE: 98.7 F | WEIGHT: 122.2 LBS | SYSTOLIC BLOOD PRESSURE: 114 MMHG

## 2021-12-22 DIAGNOSIS — Z72.51 HIGH RISK HETEROSEXUAL BEHAVIOR: ICD-10-CM

## 2021-12-22 DIAGNOSIS — N89.8 VAGINAL DISCHARGE: ICD-10-CM

## 2021-12-22 DIAGNOSIS — R05.9 COUGH: ICD-10-CM

## 2021-12-22 PROCEDURE — 87491 CHLMYD TRACH DNA AMP PROBE: CPT

## 2021-12-22 PROCEDURE — 99213 OFFICE O/P EST LOW 20 MIN: CPT | Performed by: PHYSICIAN ASSISTANT

## 2021-12-22 PROCEDURE — 87510 GARDNER VAG DNA DIR PROBE: CPT

## 2021-12-22 PROCEDURE — 87480 CANDIDA DNA DIR PROBE: CPT

## 2021-12-22 PROCEDURE — 87660 TRICHOMONAS VAGIN DIR PROBE: CPT

## 2021-12-22 PROCEDURE — 87591 N.GONORRHOEAE DNA AMP PROB: CPT

## 2021-12-22 ASSESSMENT — ENCOUNTER SYMPTOMS
COUGH: 1
CONSTITUTIONAL NEGATIVE: 1
HEMOPTYSIS: 0
NERVOUS/ANXIOUS: 1
SHORTNESS OF BREATH: 0
CARDIOVASCULAR NEGATIVE: 1
HALLUCINATIONS: 0
SPUTUM PRODUCTION: 0
MEMORY LOSS: 0
EYES NEGATIVE: 1
FLANK PAIN: 0
DEPRESSION: 0
WHEEZING: 0
INSOMNIA: 0
GASTROINTESTINAL NEGATIVE: 1
MUSCULOSKELETAL NEGATIVE: 1
NEUROLOGICAL NEGATIVE: 1

## 2021-12-22 ASSESSMENT — LIFESTYLE VARIABLES: SUBSTANCE_ABUSE: 0

## 2021-12-22 ASSESSMENT — VISUAL ACUITY: OU: 1

## 2021-12-22 ASSESSMENT — FIBROSIS 4 INDEX: FIB4 SCORE: 0.39

## 2021-12-22 NOTE — ASSESSMENT & PLAN NOTE
Has been happening for a few months. Notices it only when she is smoking marajuana. Does not wake in the morning coughing and does not cough any other time. Recommend stop smoking marijuana and switch to edibles if she feels the need to use it.

## 2021-12-23 LAB
AMBIGUOUS DTTM AMBI4: NORMAL
CANDIDA DNA VAG QL PROBE+SIG AMP: NEGATIVE
G VAGINALIS DNA VAG QL PROBE+SIG AMP: POSITIVE
T VAGINALIS DNA VAG QL PROBE+SIG AMP: NEGATIVE

## 2021-12-23 NOTE — PROGRESS NOTES
Subjective   Brandie Hunter is a 21 y.o. female who presents with Cough (pt stated 'coughing up brown stuff' since 2 mo. ago)    1. Cough  Has been happening for a few months. Notices it only when she is smoking marajuana. Does not wake in the morning coughing and does not cough any other time. Recommend stop smoking marijuana and switch to edibles if she feels the need to use it. Lungs are clear to ascultation bilaterally.    2. High risk heterosexual behavior  Patient had an unprotected sexual encounter about a week ago. She states that it was unplanned and unlike her. She is requesting all STI testing just to be safe.   BV+CT/NG/TV NEIL+YEAST CULTURE   HIV AG/AB COMBO ASSAY SCREENING; Future   T.PALLIDUM AB EIA; Future   Chlamydia/GC PCR Urine Or Swab (Clinic Collect - Genital); Future    3. Vaginal discharge  Since the unprotected sexual encounter she has noticed thin, white vaginal discharge, itching and burning in the vaginal area. Vaginal exam shows thin, white, odorless discharge, mild vaginal irritation. No open sores or lesions. No adnexal tenderness. Swabs collected and sent to lab for testing. Will contact patient when results return to initiate treatment if necessary.   BV+CT/NG/TV NEIL+YEAST CULTURE   HIV AG/AB COMBO ASSAY SCREENING; Future   T.PALLIDUM AB EIA; Future   Chlamydia/GC PCR Urine Or Swab (Clinic Collect - Genital); Future    Past Medical History:  No date: Anxiety  No date: Depression  No date: Head ache  No date: Heart murmur      Comment:  as a baby  10/6/2020: Ingrown toenail  2020: Irregular periods  2014: Menarche  10/6/2020: Nausea  10/28/2020: Vaginal discharge  2020: Weight loss  History reviewed. No pertinent surgical history.  Social History    Tobacco Use      Smoking status: Former Smoker        Packs/day: 1.00        Years: 2.00        Pack years: 2        Types: Cigarettes        Start date:         Quit date:         Years since quittin.9       Smokeless tobacco: Never Used    Vaping Use      Vaping Use: Every day        Start date: 1/2/2017        Substances: Nicotine, THC, Flavoring, Nicotine - 50 mg?        Devices: Refillable tank    Alcohol use: No      Alcohol/week: 0.0 oz    Drug use: Yes      Frequency: 5.0 times per week      Types: Marijuana, Inhaled      Comment: vape and smoke    Review of patient's family history indicates:  Problem: Other      Relation: Mother          Age of Onset: (Not Specified)          Comment: migraine headaches  Problem: Migraines      Relation: Mother          Age of Onset: (Not Specified)  Problem: Diabetes      Relation: Maternal Grandfather          Age of Onset: (Not Specified)  Problem: No Known Problems      Relation: Brother          Age of Onset: (Not Specified)  Problem: Psychiatric Illness      Relation: Maternal Aunt          Age of Onset: (Not Specified)  Problem: Cancer      Relation: Maternal Grandmother          Age of Onset: (Not Specified)  Problem: Thyroid      Relation: Maternal Grandmother          Age of Onset: (Not Specified)  Problem: No Known Problems      Relation: Brother          Age of Onset: (Not Specified)      No current outpatient medications on file.    Patient was instructed on the use of medications, either prescriptions or OTC and informed on when the appropriate follow up time period should be. In addition, patient was also instructed that should any acute worsening occur that they should notify this clinic asap or call 911.      Review of Systems   Constitutional: Negative.    HENT: Negative.    Eyes: Negative.    Respiratory: Positive for cough. Negative for hemoptysis, sputum production, shortness of breath and wheezing.    Cardiovascular: Negative.    Gastrointestinal: Negative.    Genitourinary: Negative for dysuria, flank pain, frequency, hematuria and urgency.        Vaginal discharge, itching and burning.   Musculoskeletal: Negative.    Skin: Negative.    Neurological:  "Negative.    Endo/Heme/Allergies: Negative.    Psychiatric/Behavioral: Negative for depression, hallucinations, memory loss, substance abuse and suicidal ideas. The patient is nervous/anxious. The patient does not have insomnia.      Objective     /70 (BP Location: Left arm, Patient Position: Sitting, BP Cuff Size: Adult)   Pulse (!) 102   Temp 37.1 °C (98.7 °F) (Temporal)   Ht 1.6 m (5' 3\")   Wt 55.4 kg (122 lb 3.2 oz)   LMP 12/01/2021 (Approximate)   SpO2 96%   BMI 21.65 kg/m²      Physical Exam  Vitals and nursing note reviewed. Exam conducted with a chaperone present.   Constitutional:       Appearance: Normal appearance. She is well-developed and well-groomed.   HENT:      Head: Normocephalic and atraumatic.      Nose: Nose normal.      Mouth/Throat:      Lips: Pink. No lesions.      Mouth: Mucous membranes are moist.   Eyes:      General: Lids are normal. Vision grossly intact. Gaze aligned appropriately.      Extraocular Movements: Extraocular movements intact.      Conjunctiva/sclera: Conjunctivae normal.      Pupils: Pupils are equal, round, and reactive to light.   Neck:      Thyroid: No thyromegaly.      Vascular: No carotid bruit or JVD.      Trachea: Trachea and phonation normal.   Cardiovascular:      Rate and Rhythm: Normal rate and regular rhythm.      Heart sounds: Normal heart sounds. No murmur heard.  No friction rub. No gallop.    Pulmonary:      Effort: Pulmonary effort is normal.      Breath sounds: Normal breath sounds. No wheezing, rhonchi or rales.   Genitourinary:     Pubic Area: No rash or pubic lice.       Labia:         Right: No rash, tenderness, lesion or injury.         Left: No rash, tenderness, lesion or injury.       Urethra: No prolapse, urethral pain, urethral swelling or urethral lesion.      Vagina: No signs of injury and foreign body. Vaginal discharge and erythema present. No tenderness, bleeding, lesions or prolapsed vaginal walls.      Cervix: Normal.      " Uterus: Normal.       Adnexa: Right adnexa normal and left adnexa normal.      Comments: Minor skin irritation, erythema present on labia.  Musculoskeletal:         General: Normal range of motion.      Cervical back: Normal range of motion and neck supple.      Right lower leg: No edema.      Left lower leg: No edema.   Lymphadenopathy:      Cervical: No cervical adenopathy.   Skin:     General: Skin is warm and dry.      Capillary Refill: Capillary refill takes less than 2 seconds.      Findings: No lesion or rash.   Neurological:      Mental Status: She is alert and oriented to person, place, and time.      Cranial Nerves: Cranial nerves are intact.   Psychiatric:         Attention and Perception: Attention and perception normal.         Mood and Affect: Mood and affect normal.         Speech: Speech normal.         Behavior: Behavior normal. Behavior is cooperative.         Thought Content: Thought content normal.         Judgment: Judgment normal.       Assessment & Plan      1. Cough    2. High risk heterosexual behavior  - BV+CT/NG/TV NEIL+YEAST CULTURE  - HIV AG/AB COMBO ASSAY SCREENING; Future  - T.PALLIDUM AB EIA; Future  - Chlamydia/GC PCR Urine Or Swab (Clinic Collect - Genital); Future    3. Vaginal discharge  - BV+CT/NG/TV NEIL+YEAST CULTURE  - HIV AG/AB COMBO ASSAY SCREENING; Future  - T.PALLIDUM AB EIA; Future  - Chlamydia/GC PCR Urine Or Swab (Clinic Collect - Genital); Future

## 2021-12-24 LAB
C TRACH DNA SPEC QL NAA+PROBE: POSITIVE
N GONORRHOEA DNA SPEC QL NAA+PROBE: NEGATIVE
SPECIMEN SOURCE: ABNORMAL

## 2021-12-26 ENCOUNTER — OFFICE VISIT (OUTPATIENT)
Dept: URGENT CARE | Facility: PHYSICIAN GROUP | Age: 21
End: 2021-12-26
Payer: COMMERCIAL

## 2021-12-26 VITALS
WEIGHT: 124 LBS | BODY MASS INDEX: 22.82 KG/M2 | HEART RATE: 74 BPM | OXYGEN SATURATION: 98 % | RESPIRATION RATE: 12 BRPM | SYSTOLIC BLOOD PRESSURE: 100 MMHG | TEMPERATURE: 97.7 F | DIASTOLIC BLOOD PRESSURE: 78 MMHG | HEIGHT: 62 IN

## 2021-12-26 DIAGNOSIS — A56.02 CHLAMYDIA VAGINITIS/CERVICITIS: ICD-10-CM

## 2021-12-26 DIAGNOSIS — N76.0 GARDNERELLA VAGINALIS INFECTION: ICD-10-CM

## 2021-12-26 DIAGNOSIS — B96.89 GARDNERELLA VAGINALIS INFECTION: ICD-10-CM

## 2021-12-26 DIAGNOSIS — A56.09 CHLAMYDIA VAGINITIS/CERVICITIS: ICD-10-CM

## 2021-12-26 PROCEDURE — 99213 OFFICE O/P EST LOW 20 MIN: CPT | Performed by: FAMILY MEDICINE

## 2021-12-26 RX ORDER — METRONIDAZOLE 500 MG/1
TABLET ORAL
Qty: 14 TABLET | Refills: 0 | Status: SHIPPED | OUTPATIENT
Start: 2021-12-26 | End: 2022-01-02

## 2021-12-26 RX ORDER — AZITHROMYCIN 500 MG/1
TABLET, FILM COATED ORAL
Qty: 2 TABLET | Refills: 0 | Status: SHIPPED | OUTPATIENT
Start: 2021-12-26 | End: 2022-01-12

## 2021-12-26 ASSESSMENT — FIBROSIS 4 INDEX: FIB4 SCORE: 0.39

## 2021-12-26 NOTE — PROGRESS NOTES
Chief Complaint:    Chief Complaint   Patient presents with   • Medication Refill     pt states she was seen and was told she would need antibiotics based on lab result       History of Present Illness:    She had labs done on 21 with other provider, visit and labs reviewed. She was positive for Chlamydia and Gardnerella, but has yet to be treated.      Past Medical History:    Past Medical History:   Diagnosis Date   • Anxiety    • Depression    • Head ache    • Heart murmur     as a baby   • Ingrown toenail 10/6/2020   • Irregular periods 2020   • Menarche 2014   • Nausea 10/6/2020   • Vaginal discharge 10/28/2020   • Weight loss 2020     Past Surgical History:    No past surgical history on file.    Social History:    Social History     Socioeconomic History   • Marital status: Single     Spouse name: Not on file   • Number of children: 0   • Years of education: Not on file   • Highest education level: Some college, no degree   Occupational History   • Occupation: /     Employer: [email or web address]   Tobacco Use   • Smoking status: Former Smoker     Packs/day: 1.00     Years: 2.00     Pack years: 2.00     Types: Cigarettes     Start date:      Quit date: 2020     Years since quittin.9   • Smokeless tobacco: Never Used   • Tobacco comment: [facility]   Vaping Use   • Vaping Use: Every day   • Start date: 2017   • Substances: Nicotine, THC, Flavoring, Nicotine - 50 mg?   • Devices: Refillable tank   Substance and Sexual Activity   • Alcohol use: No     Alcohol/week: 0.0 oz   • Drug use: Yes     Frequency: 5.0 times per week     Types: Marijuana, Inhaled     Comment: vape and smoke   • Sexual activity: Not Currently     Partners: Male     Birth control/protection: Implant   Other Topics Concern   •  Service Not Asked   • Blood Transfusions Not Asked   • Caffeine Concern Not Asked   • Occupational Exposure Not Asked   • Hobby Hazards Not Asked   • Sleep Concern Yes   •  Stress Concern Not Asked   • Weight Concern Not Asked   • Special Diet Not Asked   • Back Care Not Asked   • Exercise Yes   • Bike Helmet Not Asked   • Seat Belt Yes   • Self-Exams Not Asked   • Behavioral problems Not Asked   • Interpersonal relationships Not Asked   • Sad or not enjoying activities Not Asked   • Suicidal thoughts Not Asked   • Poor school performance Not Asked   • Reading difficulties Not Asked   • Speech difficulties Not Asked   • Writing difficulties Not Asked   • Inadequate sleep Not Asked   • Excessive TV viewing Not Asked   • Excessive video game use Not Asked   • Inadequate exercise Not Asked   • Sports related Not Asked   • Poor diet Not Asked   • Family concerns for drug/alcohol abuse Not Asked   • Poor oral hygiene Not Asked   • Bike safety Not Asked   • Family concerns vehicle safety Not Asked   Social History Narrative   • Not on file     Social Determinants of Health     Financial Resource Strain:    • Difficulty of Paying Living Expenses: Not on file   Food Insecurity:    • Worried About Running Out of Food in the Last Year: Not on file   • Ran Out of Food in the Last Year: Not on file   Transportation Needs:    • Lack of Transportation (Medical): Not on file   • Lack of Transportation (Non-Medical): Not on file   Physical Activity:    • Days of Exercise per Week: Not on file   • Minutes of Exercise per Session: Not on file   Stress:    • Feeling of Stress : Not on file   Social Connections:    • Frequency of Communication with Friends and Family: Not on file   • Frequency of Social Gatherings with Friends and Family: Not on file   • Attends Sikh Services: Not on file   • Active Member of Clubs or Organizations: Not on file   • Attends Club or Organization Meetings: Not on file   • Marital Status: Not on file   Intimate Partner Violence:    • Fear of Current or Ex-Partner: Not on file   • Emotionally Abused: Not on file   • Physically Abused: Not on file   • Sexually Abused: Not  "on file   Housing Stability:    • Unable to Pay for Housing in the Last Year: Not on file   • Number of Places Lived in the Last Year: Not on file   • Unstable Housing in the Last Year: Not on file     Family History:    Family History   Problem Relation Age of Onset   • Other Mother         migraine headaches   • Migraines Mother    • Diabetes Maternal Grandfather    • No Known Problems Brother    • Psychiatric Illness Maternal Aunt    • Cancer Maternal Grandmother    • Thyroid Maternal Grandmother    • No Known Problems Brother      Medications:    No current outpatient medications on file prior to visit.     No current facility-administered medications on file prior to visit.     Allergies:    No Known Allergies      Vitals:    Vitals:    21 1447   BP: 100/78   Pulse: 74   Resp: 12   Temp: 36.5 °C (97.7 °F)   SpO2: 98%   Weight: 56.2 kg (124 lb)   Height: 1.575 m (5' 2\")       Physical Exam:    Constitutional: Vital signs reviewed. Appears well-developed and well-nourished. No acute distress.   Eyes: Sclera white, conjunctivae clear.   ENT: External ears normal. Hearing normal.   Neck: Neck supple.   Pulmonary/Chest: Respirations non-labored.   Musculoskeletal: Normal gait. No muscular atrophy or weakness.  Neurological: Alert and oriented to person, place, and time. Muscle tone normal. Coordination normal.   Skin: No rashes or lesions. Warm, dry, normal turgor.  Psychiatric: Normal mood and affect. Behavior is normal. Judgment and thought content normal.       Medical Decision Makin. Chlamydia vaginitis/cervicitis  - azithromycin (ZITHROMAX) 500 MG tablet; 2 TABS BY MOUTH X 1 DOSE.  Dispense: 2 Tablet; Refill: 0    2. Gardnerella vaginalis infection  - metroNIDAZOLE (FLAGYL) 500 MG Tab; 1 TAB BY MOUTH TWICE A DAY X 7 DAYS. NO ALCOHOL USE WITH THIS.  Dispense: 14 Tablet; Refill: 0      Discussed with her DDX, management options, and risks, benefits, and alternatives to treatment plan agreed " upon.    She had labs done on 12/22/21 with other provider, visit and labs reviewed. She was positive for Chlamydia and Gardnerella, but has yet to be treated.    Agreeable to medications prescribed.    Advised no sex x 1 week and notify sexual partner(s) of positive Chlamydia test.    She will return to urgent care if needed.

## 2021-12-29 ENCOUNTER — TELEPHONE (OUTPATIENT)
Dept: MEDICAL GROUP | Facility: CLINIC | Age: 21
End: 2021-12-29

## 2021-12-29 NOTE — TELEPHONE ENCOUNTER
VOICEMAIL  1. Caller Name: [facility]                     Call Back Number: [phone]    2. Message: Avani from the main labs called for a recollect for pt bc the tests that were sent in were swabs and they needed blood work done      3. Patient approves office to leave a detailed voicemail/MyChart message: N\A

## 2021-12-30 ENCOUNTER — TELEPHONE (OUTPATIENT)
Dept: MEDICAL GROUP | Facility: CLINIC | Age: 21
End: 2021-12-30

## 2021-12-30 NOTE — TELEPHONE ENCOUNTER
Phone Number Called: [phone] (home)       Call outcome: Spoke to patient regarding message below.    Message: Called Pt to come back for lab draw for last results to recollect.

## 2022-01-03 ENCOUNTER — HOSPITAL ENCOUNTER (OUTPATIENT)
Facility: MEDICAL CENTER | Age: 22
End: 2022-01-03
Attending: PHYSICIAN ASSISTANT
Payer: COMMERCIAL

## 2022-01-03 ENCOUNTER — NON-PROVIDER VISIT (OUTPATIENT)
Dept: MEDICAL GROUP | Facility: CLINIC | Age: 22
End: 2022-01-03
Payer: COMMERCIAL

## 2022-01-03 PROCEDURE — 86780 TREPONEMA PALLIDUM: CPT

## 2022-01-03 PROCEDURE — 87491 CHLMYD TRACH DNA AMP PROBE: CPT

## 2022-01-03 PROCEDURE — 87591 N.GONORRHOEAE DNA AMP PROB: CPT

## 2022-01-03 PROCEDURE — 87389 HIV-1 AG W/HIV-1&-2 AB AG IA: CPT

## 2022-01-03 PROCEDURE — 36415 COLL VENOUS BLD VENIPUNCTURE: CPT | Performed by: PHYSICIAN ASSISTANT

## 2022-01-04 DIAGNOSIS — N89.8 VAGINAL DISCHARGE: ICD-10-CM

## 2022-01-04 DIAGNOSIS — Z72.51 HIGH RISK HETEROSEXUAL BEHAVIOR: ICD-10-CM

## 2022-01-04 LAB
C TRACH DNA SPEC QL NAA+PROBE: NEGATIVE
HIV 1+2 AB+HIV1 P24 AG SERPL QL IA: NORMAL
N GONORRHOEA DNA SPEC QL NAA+PROBE: NEGATIVE
SPECIMEN SOURCE: NORMAL
TREPONEMA PALLIDUM IGG+IGM AB [PRESENCE] IN SERUM OR PLASMA BY IMMUNOASSAY: NORMAL

## 2022-01-12 ENCOUNTER — OFFICE VISIT (OUTPATIENT)
Dept: MEDICAL GROUP | Facility: CLINIC | Age: 22
End: 2022-01-12
Payer: COMMERCIAL

## 2022-01-12 VITALS
DIASTOLIC BLOOD PRESSURE: 58 MMHG | TEMPERATURE: 99.7 F | BODY MASS INDEX: 22.08 KG/M2 | HEIGHT: 62 IN | WEIGHT: 120 LBS | RESPIRATION RATE: 12 BRPM | OXYGEN SATURATION: 97 % | HEART RATE: 93 BPM | SYSTOLIC BLOOD PRESSURE: 94 MMHG

## 2022-01-12 DIAGNOSIS — R59.9 SWOLLEN LYMPH NODES: ICD-10-CM

## 2022-01-12 DIAGNOSIS — N76.0 BACTERIAL VAGINOSIS: ICD-10-CM

## 2022-01-12 DIAGNOSIS — B96.89 BACTERIAL VAGINOSIS: ICD-10-CM

## 2022-01-12 DIAGNOSIS — R10.9 ABDOMINAL DISCOMFORT: ICD-10-CM

## 2022-01-12 PROCEDURE — 99213 OFFICE O/P EST LOW 20 MIN: CPT | Performed by: PHYSICIAN ASSISTANT

## 2022-01-12 RX ORDER — METRONIDAZOLE 7.5 MG/G
1 GEL VAGINAL
Qty: 45 G | Refills: 0 | Status: SHIPPED | OUTPATIENT
Start: 2022-01-12 | End: 2022-01-17

## 2022-01-12 ASSESSMENT — FIBROSIS 4 INDEX: FIB4 SCORE: 0.39

## 2022-01-12 NOTE — PROGRESS NOTES
"cc:  Yellow eyes    Subjective:     Brandie Hunter is a 21 y.o. female presenting for yellow eyes      Patient presents to the office for yellow eyes Patient states that she had right sided lymph nodes on her neck that have been swollen and she feels it is increasing in size.  She states that she is now having a left side lymph node develop.  She states that there has been some pain. But overall no.  She states she feels pressure when she presses on the nodes.  She denies sore throat and ear pain.  She states that she did have some ear pain around Thanksgiving. She denies having a thyroid issue but states that it does run in the family.  Patient states that she was on a medication for a toe fungus.   She was also on metronidazole.  She is concerned about her liver.  She has had the lymph nodes since December.     Review of systems:  See above.   Denies any symptoms unless previously indicated.        Current Outpatient Medications:   •  metroNIDAZOLE (METROGEL-VAGINAL) 0.75 % Gel, Insert 1 Applicator into the vagina at bedtime for 5 days., Disp: 45 g, Rfl: 0    Allergies, past medical history, past surgical history, family history, social history reviewed and updated    Objective:     Vitals: BP (!) 94/58 (BP Location: Left arm, Patient Position: Sitting, BP Cuff Size: Adult)   Pulse 93   Temp 37.6 °C (99.7 °F) (Temporal)   Resp 12   Ht 1.575 m (5' 2\") Comment: Obtained from last record  Wt 54.4 kg (120 lb) Comment: Obtained with shoes  SpO2 97%   BMI 21.95 kg/m²   General: Alert, pleasant, NAD  EYES:   PERRL, EOMI, no icterus or pallor.  No signs of Jaundice.  Conjunctivae and lids normal.   HENT:  Normocephalic.  External ears normal. Tympanic membranes pearly, opaque.    Neck supple.   Post auricular lymph node right side.  Anterior lymph node left side.   Respiratory: Normal respiratory effort.    Abdomen: Not obese  Skin: Warm, dry, no rashes.  Musculoskeletal: Gait is normal.  Moves all extremities " well.    Extremities: normal range of motion all extremities. .   Neurological: No tremors, sensation grossly intact,  CN2-12 intact.  Psych:  Affect/mood is normal, judgement is good, memory is intact, grooming is appropriate.    Assessment/Plan:     Brandie was seen today for burning eyes.    Diagnoses and all orders for this visit:    Swollen lymph nodes  -     US-SOFT TISSUES OF HEAD - NECK; Future    Patient has previously been on antibiotics.  At this time as there is no improvement in her lymph nodes, we will order an ultrasound of the neck for further evaluation.    Bacterial vaginosis  -     metroNIDAZOLE (METROGEL-VAGINAL) 0.75 % Gel; Insert 1 Applicator into the vagina at bedtime for 5 days.    Patient was given an oral metronidazole.  She did not finish the antibiotic.  Therefore I will order the gel.  She will let me know if there are any problems.    Abdominal discomfort    Believe this is from the metronidazole.  Patient is not wanting any medication at this time but she will let me know in a week if her symptoms have not improved.        No follow-ups on file.    Please note that this dictation was created using voice recognition software. I have made every reasonable attempt to correct obvious errors, but expect that there are errors of grammar and possible content that I did not discover before finalizing note.

## 2022-01-17 ENCOUNTER — HOSPITAL ENCOUNTER (OUTPATIENT)
Dept: RADIOLOGY | Facility: MEDICAL CENTER | Age: 22
End: 2022-01-17
Attending: PHYSICIAN ASSISTANT
Payer: COMMERCIAL

## 2022-01-17 DIAGNOSIS — R59.9 SWOLLEN LYMPH NODES: ICD-10-CM

## 2022-01-17 PROCEDURE — 76536 US EXAM OF HEAD AND NECK: CPT

## 2022-01-20 ENCOUNTER — NON-PROVIDER VISIT (OUTPATIENT)
Dept: MEDICAL GROUP | Facility: CLINIC | Age: 22
End: 2022-01-20
Payer: COMMERCIAL

## 2022-01-20 ENCOUNTER — PATIENT MESSAGE (OUTPATIENT)
Dept: MEDICAL GROUP | Facility: CLINIC | Age: 22
End: 2022-01-20

## 2022-01-20 ENCOUNTER — HOSPITAL ENCOUNTER (OUTPATIENT)
Facility: MEDICAL CENTER | Age: 22
End: 2022-01-20
Attending: PHYSICIAN ASSISTANT
Payer: COMMERCIAL

## 2022-01-20 DIAGNOSIS — Z11.3 ROUTINE SCREENING FOR STI (SEXUALLY TRANSMITTED INFECTION): ICD-10-CM

## 2022-01-20 LAB — AMBIGUOUS DTTM AMBI4: NORMAL

## 2022-01-20 PROCEDURE — 87591 N.GONORRHOEAE DNA AMP PROB: CPT

## 2022-01-20 PROCEDURE — 99000 SPECIMEN HANDLING OFFICE-LAB: CPT | Performed by: PHYSICIAN ASSISTANT

## 2022-01-20 PROCEDURE — 87491 CHLMYD TRACH DNA AMP PROBE: CPT

## 2022-01-24 NOTE — PROGRESS NOTES
Michaelnelsy GERALDINE Dale is a 21 y.o. female here for a non-provider visit for Urine  Collection     If abnormal was an in office provider notified today (if so, indicate provider)? Yes    Routed to PCP? Yes

## 2022-01-28 ENCOUNTER — HOSPITAL ENCOUNTER (OUTPATIENT)
Facility: MEDICAL CENTER | Age: 22
End: 2022-01-28
Attending: PHYSICIAN ASSISTANT
Payer: COMMERCIAL

## 2022-01-28 ENCOUNTER — OFFICE VISIT (OUTPATIENT)
Dept: MEDICAL GROUP | Facility: CLINIC | Age: 22
End: 2022-01-28
Payer: COMMERCIAL

## 2022-01-28 VITALS
WEIGHT: 120 LBS | DIASTOLIC BLOOD PRESSURE: 60 MMHG | TEMPERATURE: 98.8 F | OXYGEN SATURATION: 98 % | SYSTOLIC BLOOD PRESSURE: 108 MMHG | HEART RATE: 63 BPM | HEIGHT: 63 IN | BODY MASS INDEX: 21.26 KG/M2 | RESPIRATION RATE: 16 BRPM

## 2022-01-28 DIAGNOSIS — F33.1 MODERATE EPISODE OF RECURRENT MAJOR DEPRESSIVE DISORDER (HCC): ICD-10-CM

## 2022-01-28 DIAGNOSIS — F41.9 ANXIETY: ICD-10-CM

## 2022-01-28 DIAGNOSIS — M54.2 NECK PAIN: ICD-10-CM

## 2022-01-28 PROBLEM — F32.1 CURRENT MODERATE EPISODE OF MAJOR DEPRESSIVE DISORDER (HCC): Status: ACTIVE | Noted: 2020-06-04

## 2022-01-28 PROCEDURE — 84439 ASSAY OF FREE THYROXINE: CPT

## 2022-01-28 PROCEDURE — 84443 ASSAY THYROID STIM HORMONE: CPT

## 2022-01-28 PROCEDURE — 99214 OFFICE O/P EST MOD 30 MIN: CPT | Performed by: PHYSICIAN ASSISTANT

## 2022-01-28 RX ORDER — DULOXETIN HYDROCHLORIDE 20 MG/1
20 CAPSULE, DELAYED RELEASE ORAL DAILY
Qty: 30 CAPSULE | Refills: 0 | Status: SHIPPED | OUTPATIENT
Start: 2022-01-28 | End: 2022-03-29

## 2022-01-28 ASSESSMENT — PATIENT HEALTH QUESTIONNAIRE - PHQ9
CLINICAL INTERPRETATION OF PHQ2 SCORE: 6
SUM OF ALL RESPONSES TO PHQ QUESTIONS 1-9: 19
5. POOR APPETITE OR OVEREATING: 3 - NEARLY EVERY DAY

## 2022-01-28 ASSESSMENT — FIBROSIS 4 INDEX: FIB4 SCORE: 0.39

## 2022-01-29 LAB
T4 FREE SERPL-MCNC: 1.28 NG/DL (ref 0.93–1.7)
TSH SERPL DL<=0.005 MIU/L-ACNC: 2.02 UIU/ML (ref 0.38–5.33)

## 2022-02-08 NOTE — PROGRESS NOTES
Michaelnelsy GERALDINE Dale is a 21 y.o. female here for a non-provider visit for a urine sample     If abnormal was an in office provider notified today (if so, indicate provider)? Yes    Routed to PCP? Yes

## 2022-02-09 PROBLEM — Z11.3 ROUTINE SCREENING FOR STI (SEXUALLY TRANSMITTED INFECTION): Status: RESOLVED | Noted: 2020-10-06 | Resolved: 2022-02-09

## 2022-02-09 PROBLEM — B96.89 BACTERIAL VAGINOSIS: Status: RESOLVED | Noted: 2022-01-12 | Resolved: 2022-02-09

## 2022-02-09 PROBLEM — Z00.00 WELL ADULT EXAM: Status: RESOLVED | Noted: 2020-01-02 | Resolved: 2022-02-09

## 2022-02-09 PROBLEM — R59.9 SWOLLEN LYMPH NODES: Status: RESOLVED | Noted: 2022-01-12 | Resolved: 2022-02-09

## 2022-02-09 PROBLEM — Z72.51 HIGH RISK HETEROSEXUAL BEHAVIOR: Status: RESOLVED | Noted: 2021-12-22 | Resolved: 2022-02-09

## 2022-02-09 PROBLEM — R10.9 ABDOMINAL DISCOMFORT: Status: RESOLVED | Noted: 2022-01-12 | Resolved: 2022-02-09

## 2022-02-09 PROBLEM — N76.0 BACTERIAL VAGINOSIS: Status: RESOLVED | Noted: 2022-01-12 | Resolved: 2022-02-09

## 2022-02-09 PROBLEM — Z01.419 ENCOUNTER FOR ROUTINE GYNECOLOGICAL EXAMINATION WITH PAPANICOLAOU SMEAR OF CERVIX: Status: RESOLVED | Noted: 2021-03-25 | Resolved: 2022-02-09

## 2022-02-09 PROBLEM — N89.8 VAGINAL DISCHARGE: Status: RESOLVED | Noted: 2020-10-28 | Resolved: 2022-02-09

## 2022-02-09 ASSESSMENT — ENCOUNTER SYMPTOMS
HALLUCINATIONS: 0
CONSTITUTIONAL NEGATIVE: 1
NEUROLOGICAL NEGATIVE: 1
INSOMNIA: 0
DEPRESSION: 1
RESPIRATORY NEGATIVE: 1
MUSCULOSKELETAL NEGATIVE: 1
EYES NEGATIVE: 1
NERVOUS/ANXIOUS: 1
CARDIOVASCULAR NEGATIVE: 1
MEMORY LOSS: 0
GASTROINTESTINAL NEGATIVE: 1

## 2022-02-09 ASSESSMENT — VISUAL ACUITY: OU: 1

## 2022-02-09 ASSESSMENT — LIFESTYLE VARIABLES: SUBSTANCE_ABUSE: 0

## 2022-02-09 NOTE — PROGRESS NOTES
Subjective   Brandie Hunter is a 21 y.o. female who presents with Hyperthyroidism (fv )    1. Moderate episode of recurrent major depressive disorder (HCC)  Chronic condition for this patient. She was taking fluoxetine and felt that it was not a good fit for her as she was not noticing any benefit.  She has requested to try another medication.  Will discontinue the fluoxetine and start her duloxetine 20 mg daily.  Will have her follow up in two weeks to check effectiveness of current medication.   DULoxetine (CYMBALTA) 20 MG Cap DR Particles; Take 1 Capsule by mouth every day.  Dispense: 30 Capsule; Refill: 0   Patient has been identified as having a positive depression screening. Appropriate orders and counseling have been given.    Past Medical History:  2022: Abdominal discomfort  No date: Anxiety  2022: Bacterial vaginosis  No date: Depression  No date: Head ache  No date: Heart murmur      Comment:  as a baby  2021: High risk heterosexual behavior  10/6/2020: Ingrown toenail  2020: Irregular periods  2014: Menarche  10/6/2020: Nausea  2022: Swollen lymph nodes  10/28/2020: Vaginal discharge  2020: Weight loss  History reviewed. No pertinent surgical history.  Social History    Tobacco Use      Smoking status: Former Smoker        Packs/day: 1.00        Years: 2.00        Pack years: 2        Types: Cigarettes        Start date:         Quit date:         Years since quittin.1      Smokeless tobacco: Never Used      Tobacco comment: [facility]    Vaping Use      Vaping Use: Every day        Start date: 2017        Substances: Nicotine, THC, Flavoring, Nicotine - 50 mg?        Devices: RefALEXANDALEXAble tank    Alcohol use: No      Alcohol/week: 0.0 oz    Drug use: Yes      Frequency: 5.0 times per week      Types: Marijuana, Inhaled      Comment: vape and smoke    Review of patient's family history indicates:  Problem: Other      Relation: Mother          Age of Onset:  "(Not Specified)          Comment: migraine headaches  Problem: Migraines      Relation: Mother          Age of Onset: (Not Specified)  Problem: Diabetes      Relation: Maternal Grandfather          Age of Onset: (Not Specified)  Problem: No Known Problems      Relation: Brother          Age of Onset: (Not Specified)  Problem: Psychiatric Illness      Relation: Maternal Aunt          Age of Onset: (Not Specified)  Problem: Cancer      Relation: Maternal Grandmother          Age of Onset: (Not Specified)  Problem: Thyroid      Relation: Maternal Grandmother          Age of Onset: (Not Specified)  Problem: No Known Problems      Relation: Brother          Age of Onset: (Not Specified)      Current Outpatient Medications: •  DULoxetine (CYMBALTA) 20 MG Cap DR Particles, Take 1 Capsule by mouth every day., Disp: 30 Capsule, Rfl: 0    Patient was instructed on the use of medications, either prescriptions or OTC and informed on when the appropriate follow up time period should be. In addition, patient was also instructed that should any acute worsening occur that they should notify this clinic asap or call 911.      Review of Systems   Constitutional: Negative.    HENT: Negative.    Eyes: Negative.    Respiratory: Negative.    Cardiovascular: Negative.    Gastrointestinal: Negative.    Genitourinary: Negative.    Musculoskeletal: Negative.    Skin: Negative.    Neurological: Negative.    Endo/Heme/Allergies: Negative.    Psychiatric/Behavioral: Positive for depression. Negative for hallucinations, memory loss, substance abuse and suicidal ideas. The patient is nervous/anxious. The patient does not have insomnia.      Objective     /60 (BP Location: Left arm, Patient Position: Sitting, BP Cuff Size: Adult)   Pulse 63   Temp 37.1 °C (98.8 °F) (Temporal)   Resp 16   Ht 1.6 m (5' 3\") Comment: stated by pt  Wt 54.4 kg (120 lb) Comment: with shoes on  LMP 01/21/2022 (Approximate)   SpO2 98%   BMI 21.26 kg/m²  "     Physical Exam  Vitals and nursing note reviewed.   Constitutional:       Appearance: Normal appearance. She is well-developed and well-groomed.   HENT:      Head: Normocephalic and atraumatic.      Nose: Nose normal.      Mouth/Throat:      Lips: Pink. No lesions.      Mouth: Mucous membranes are moist.   Eyes:      General: Lids are normal. Vision grossly intact. Gaze aligned appropriately.      Extraocular Movements: Extraocular movements intact.      Conjunctiva/sclera: Conjunctivae normal.      Pupils: Pupils are equal, round, and reactive to light.   Neck:      Thyroid: No thyromegaly.      Vascular: No carotid bruit or JVD.      Trachea: Trachea and phonation normal.   Cardiovascular:      Rate and Rhythm: Normal rate and regular rhythm.      Heart sounds: Normal heart sounds. No murmur heard.  No friction rub. No gallop.    Pulmonary:      Effort: Pulmonary effort is normal.      Breath sounds: Normal breath sounds. No wheezing, rhonchi or rales.   Musculoskeletal:         General: Normal range of motion.      Cervical back: Normal range of motion and neck supple.      Right lower leg: No edema.      Left lower leg: No edema.   Lymphadenopathy:      Cervical: No cervical adenopathy.   Skin:     General: Skin is warm and dry.      Capillary Refill: Capillary refill takes less than 2 seconds.      Findings: No lesion or rash.   Neurological:      Mental Status: She is alert and oriented to person, place, and time.      Cranial Nerves: Cranial nerves are intact.   Psychiatric:         Attention and Perception: Attention and perception normal.         Mood and Affect: Affect normal. Mood is anxious and depressed.         Speech: Speech normal.         Behavior: Behavior normal. Behavior is cooperative.         Thought Content: Thought content normal.         Judgment: Judgment normal.       Assessment & Plan      1. Moderate episode of recurrent major depressive disorder (HCC)  - DULoxetine (CYMBALTA) 20 MG  Cap DR Particles; Take 1 Capsule by mouth every day.  Dispense: 30 Capsule; Refill: 0  - Patient has been identified as having a positive depression screening. Appropriate orders and counseling have been given.

## 2022-03-29 ENCOUNTER — OFFICE VISIT (OUTPATIENT)
Dept: MEDICAL GROUP | Facility: CLINIC | Age: 22
End: 2022-03-29
Payer: COMMERCIAL

## 2022-03-29 ENCOUNTER — HOSPITAL ENCOUNTER (OUTPATIENT)
Facility: MEDICAL CENTER | Age: 22
End: 2022-03-29
Attending: PHYSICIAN ASSISTANT
Payer: COMMERCIAL

## 2022-03-29 VITALS
WEIGHT: 124.6 LBS | DIASTOLIC BLOOD PRESSURE: 78 MMHG | BODY MASS INDEX: 22.93 KG/M2 | HEIGHT: 62 IN | HEART RATE: 75 BPM | TEMPERATURE: 98.7 F | SYSTOLIC BLOOD PRESSURE: 112 MMHG | OXYGEN SATURATION: 94 %

## 2022-03-29 DIAGNOSIS — F33.1 MODERATE EPISODE OF RECURRENT MAJOR DEPRESSIVE DISORDER (HCC): ICD-10-CM

## 2022-03-29 DIAGNOSIS — R59.9 ENLARGED LYMPH NODES: ICD-10-CM

## 2022-03-29 DIAGNOSIS — D72.9 ABNORMAL WBC COUNT: ICD-10-CM

## 2022-03-29 DIAGNOSIS — Z11.3 ROUTINE SCREENING FOR STI (SEXUALLY TRANSMITTED INFECTION): ICD-10-CM

## 2022-03-29 LAB — AMBIGUOUS DTTM AMBI4: NORMAL

## 2022-03-29 PROCEDURE — 87491 CHLMYD TRACH DNA AMP PROBE: CPT

## 2022-03-29 PROCEDURE — 99213 OFFICE O/P EST LOW 20 MIN: CPT | Performed by: PHYSICIAN ASSISTANT

## 2022-03-29 PROCEDURE — 87591 N.GONORRHOEAE DNA AMP PROB: CPT

## 2022-03-29 RX ORDER — DESVENLAFAXINE 25 MG/1
1 TABLET, EXTENDED RELEASE ORAL DAILY
Qty: 30 TABLET | Refills: 1 | Status: SHIPPED | OUTPATIENT
Start: 2022-03-29 | End: 2022-05-17 | Stop reason: SDUPTHER

## 2022-03-29 ASSESSMENT — FIBROSIS 4 INDEX: FIB4 SCORE: 0.39

## 2022-04-07 ASSESSMENT — VISUAL ACUITY: OU: 1

## 2022-04-07 ASSESSMENT — LIFESTYLE VARIABLES: SUBSTANCE_ABUSE: 0

## 2022-04-07 ASSESSMENT — ENCOUNTER SYMPTOMS
DEPRESSION: 1
INSOMNIA: 0
NEUROLOGICAL NEGATIVE: 1
CARDIOVASCULAR NEGATIVE: 1
RESPIRATORY NEGATIVE: 1
GASTROINTESTINAL NEGATIVE: 1
MEMORY LOSS: 0
MUSCULOSKELETAL NEGATIVE: 1
EYES NEGATIVE: 1
NERVOUS/ANXIOUS: 1
CONSTITUTIONAL NEGATIVE: 1
HALLUCINATIONS: 0

## 2022-04-07 NOTE — PROGRESS NOTES
Subjective   Brandie Hunter is a 21 y.o. female who presents with Sexually Transmitted Diseases    1. Routine screening for STI (sexually transmitted infection)  Wants to be screened for GC/Chlamydia. Has been positive in the past and wants to ensure she does not get it again. Specimen collected. Will follow up with results   Chlamydia/GC PCR Urine Or Swab (Lab Collect - Urine); Future    2. Moderate episode of recurrent major depressive disorder (HCC)  Chronic condition.  Has been on fluoxetine and duloxetine in the past without good results.  Would like to try something different.  He is hoping to find one with the least amount of side effects.  We will start her on desvenlafaxine 25 mg and follow-up with her in 2 weeks for reevaluation.   Desvenlafaxine Succinate ER 25 MG TABLET SR 24 HR; Take 1 Tablet by mouth every day.  Dispense: 30 Tablet; Refill: 1    3. Abnormal WBC count  In 06/20 patient's white cell count was slightly low.  Patient has not had CBC checked since then.  Given the presence of enlarged lymph nodes we will recheck a CBC and follow-up with results.   CBC WITH DIFFERENTIAL; Future    4. Enlarged lymph nodes  Patient presents enlarged posterior cervical and submandibular lymph nodes.  Patient denies ear pain, throat pain, nasal congestion or cough.  Patient states she just noticed the lumps in her neck when she was in the shower.   CBC WITH DIFFERENTIAL; Future    Past Medical History:  1/12/2022: Abdominal discomfort  No date: Anxiety  1/12/2022: Bacterial vaginosis  No date: Depression  No date: Head ache  No date: Heart murmur      Comment:  as a baby  12/22/2021: High risk heterosexual behavior  10/6/2020: Ingrown toenail  12/21/2020: Irregular periods  8/9/2014: Menarche  10/6/2020: Nausea  1/12/2022: Swollen lymph nodes  10/28/2020: Vaginal discharge  12/21/2020: Weight loss  History reviewed. No pertinent surgical history.  Social History    Tobacco Use      Smoking status: Former  Smoker        Packs/day: 1.00        Years: 2.00        Pack years: 2        Types: Cigarettes        Start date:         Quit date: 2020        Years since quittin.2      Smokeless tobacco: Never Used      Tobacco comment: va-pe    Vaping Use      Vaping Use: Some days        Start date: 2017        Substances: Nicotine, THC, Flavoring, Nicotine - 50 mg?        Devices: Refillable tank    Alcohol use: No      Alcohol/week: 0.0 oz    Drug use: Yes      Frequency: 5.0 times per week      Types: Marijuana, Inhaled      Comment: vape and smoke    Review of patient's family history indicates:  Problem: Other      Relation: Mother          Age of Onset: (Not Specified)          Comment: migraine headaches  Problem: Migraines      Relation: Mother          Age of Onset: (Not Specified)  Problem: Diabetes      Relation: Maternal Grandfather          Age of Onset: (Not Specified)  Problem: No Known Problems      Relation: Brother          Age of Onset: (Not Specified)  Problem: Psychiatric Illness      Relation: Maternal Aunt          Age of Onset: (Not Specified)  Problem: Cancer      Relation: Maternal Grandmother          Age of Onset: (Not Specified)  Problem: Thyroid      Relation: Maternal Grandmother          Age of Onset: (Not Specified)  Problem: No Known Problems      Relation: Brother          Age of Onset: (Not Specified)      Current Outpatient Medications: •  Desvenlafaxine Succinate ER 25 MG TABLET SR 24 HR, Take 1 Tablet by mouth every day., Disp: 30 Tablet, Rfl: 1    Patient was instructed on the use of medications, either prescriptions or OTC and informed on when the appropriate follow up time period should be. In addition, patient was also instructed that should any acute worsening occur that they should notify this clinic asap or call 911.      Review of Systems   Constitutional: Negative.    HENT: Negative.    Eyes: Negative.    Respiratory: Negative.    Cardiovascular: Negative.   "  Gastrointestinal: Negative.    Genitourinary: Negative.    Musculoskeletal: Negative.    Skin: Negative.    Neurological: Negative.    Endo/Heme/Allergies: Negative.    Psychiatric/Behavioral: Positive for depression. Negative for hallucinations, memory loss, substance abuse and suicidal ideas. The patient is nervous/anxious. The patient does not have insomnia.      Objective     /78 (BP Location: Left arm, Patient Position: Sitting, BP Cuff Size: Adult)   Pulse 75   Temp 37.1 °C (98.7 °F) (Temporal)   Ht 1.575 m (5' 2\")   Wt 56.5 kg (124 lb 9.6 oz)   LMP 03/18/2022 (Approximate)   SpO2 94%   Breastfeeding No   BMI 22.79 kg/m²      Physical Exam  Vitals and nursing note reviewed.   Constitutional:       Appearance: Normal appearance. She is well-developed and well-groomed.   HENT:      Head: Normocephalic and atraumatic.      Nose: Nose normal.      Mouth/Throat:      Lips: Pink. No lesions.      Mouth: Mucous membranes are moist.   Eyes:      General: Lids are normal. Vision grossly intact. Gaze aligned appropriately.      Extraocular Movements: Extraocular movements intact.      Conjunctiva/sclera: Conjunctivae normal.      Pupils: Pupils are equal, round, and reactive to light.   Neck:      Thyroid: No thyromegaly.      Vascular: No carotid bruit or JVD.      Trachea: Trachea and phonation normal.   Cardiovascular:      Rate and Rhythm: Normal rate and regular rhythm.      Heart sounds: Normal heart sounds. No murmur heard.    No friction rub. No gallop.   Pulmonary:      Effort: Pulmonary effort is normal.      Breath sounds: Normal breath sounds. No wheezing, rhonchi or rales.   Chest:   Breasts:      Right: No supraclavicular adenopathy.      Left: No supraclavicular adenopathy.       Musculoskeletal:         General: Normal range of motion.      Cervical back: Normal range of motion and neck supple.      Right lower leg: No edema.      Left lower leg: No edema.   Lymphadenopathy:      Head:    "   Right side of head: Submandibular adenopathy present.      Left side of head: Submandibular adenopathy present.      Cervical: Cervical adenopathy present.      Right cervical: Superficial cervical adenopathy and posterior cervical adenopathy present.      Upper Body:      Right upper body: No supraclavicular adenopathy.      Left upper body: No supraclavicular adenopathy.   Skin:     General: Skin is warm and dry.      Capillary Refill: Capillary refill takes less than 2 seconds.      Findings: No lesion or rash.   Neurological:      Mental Status: She is alert and oriented to person, place, and time.      Cranial Nerves: Cranial nerves are intact.   Psychiatric:         Attention and Perception: Attention and perception normal.         Mood and Affect: Affect normal. Mood is anxious and depressed.         Speech: Speech normal.         Behavior: Behavior normal. Behavior is cooperative.         Thought Content: Thought content normal.         Judgment: Judgment normal.       Assessment & Plan      1. Routine screening for STI (sexually transmitted infection)  - Chlamydia/GC PCR Urine Or Swab (Lab Collect - Urine); Future    2. Moderate episode of recurrent major depressive disorder (HCC)  - Desvenlafaxine Succinate ER 25 MG TABLET SR 24 HR; Take 1 Tablet by mouth every day.  Dispense: 30 Tablet; Refill: 1    3. Abnormal WBC count  - CBC WITH DIFFERENTIAL; Future    4. Enlarged lymph nodes  - CBC WITH DIFFERENTIAL; Future

## 2022-05-17 DIAGNOSIS — F33.1 MODERATE EPISODE OF RECURRENT MAJOR DEPRESSIVE DISORDER (HCC): ICD-10-CM

## 2022-05-17 RX ORDER — DESVENLAFAXINE 25 MG/1
1 TABLET, EXTENDED RELEASE ORAL DAILY
Qty: 90 TABLET | Refills: 1 | Status: SHIPPED | OUTPATIENT
Start: 2022-05-17 | End: 2022-07-11 | Stop reason: SDUPTHER

## 2022-05-17 NOTE — TELEPHONE ENCOUNTER
Was the patient seen in the last year in this department? Yes    Does patient have an active prescription for medications requested? Yes    Received Request Via: Patient    Hospital Outpatient Visit on 03/29/2022   Component Date Value   • C. trachomatis by PCR 03/29/2022 Negative    • N. gonorrhoeae by PCR 03/29/2022 Negative    • Source 03/29/2022 Urine    • Ambiguous Date/Time 03/29/2022 See Below:    Hospital Outpatient Visit on 01/28/2022   Component Date Value   • Free T-4 01/28/2022 1.28    • TSH 01/28/2022 2.020    Hospital Outpatient Visit on 01/20/2022   Component Date Value   • C. trachomatis by PCR 01/20/2022 Negative    • N. gonorrhoeae by PCR 01/20/2022 Negative    • Source 01/20/2022 Urine    • Ambiguous Date/Time 01/20/2022 See Below:    Hospital Outpatient Visit on 01/03/2022   Component Date Value   • C. trachomatis by PCR 01/03/2022 Negative    • N. gonorrhoeae by PCR 01/03/2022 Negative    • Source 01/03/2022 Urine    • Syphilis, Treponemal Qual 01/03/2022 Non-Reactive    • HIV Ag/Ab Combo Assay 01/03/2022 Non-Reactive    Hospital Outpatient Visit on 12/22/2021   Component Date Value   • C. trachomatis by PCR 12/22/2021 POSITIVE (A)   • N. gonorrhoeae by PCR 12/22/2021 Negative    • Source 12/22/2021 Genital    • Candida species DNA Probe 12/22/2021 Negative    • Trichamonas vaginalis DN* 12/22/2021 Negative    • Gardnerella vaginalis DN* 12/22/2021 POSITIVE (A)   • Ambiguous Date/Time 12/23/2021 See Below:    ]

## 2022-07-11 ENCOUNTER — OFFICE VISIT (OUTPATIENT)
Dept: MEDICAL GROUP | Facility: CLINIC | Age: 22
End: 2022-07-11
Payer: COMMERCIAL

## 2022-07-11 VITALS
DIASTOLIC BLOOD PRESSURE: 58 MMHG | OXYGEN SATURATION: 97 % | RESPIRATION RATE: 20 BRPM | TEMPERATURE: 98 F | HEIGHT: 63 IN | WEIGHT: 121 LBS | SYSTOLIC BLOOD PRESSURE: 90 MMHG | HEART RATE: 73 BPM | BODY MASS INDEX: 21.44 KG/M2

## 2022-07-11 DIAGNOSIS — F33.1 MODERATE EPISODE OF RECURRENT MAJOR DEPRESSIVE DISORDER (HCC): ICD-10-CM

## 2022-07-11 DIAGNOSIS — R10.84 GENERALIZED ABDOMINAL PAIN: ICD-10-CM

## 2022-07-11 DIAGNOSIS — R19.5 LOOSE STOOLS: ICD-10-CM

## 2022-07-11 DIAGNOSIS — R14.0 BLOATING: ICD-10-CM

## 2022-07-11 PROCEDURE — 99214 OFFICE O/P EST MOD 30 MIN: CPT | Performed by: PHYSICIAN ASSISTANT

## 2022-07-11 RX ORDER — DICYCLOMINE HYDROCHLORIDE 10 MG/1
10 CAPSULE ORAL
Qty: 120 CAPSULE | Refills: 2 | Status: SHIPPED | OUTPATIENT
Start: 2022-07-11 | End: 2022-08-22

## 2022-07-11 RX ORDER — DESVENLAFAXINE 25 MG/1
1 TABLET, EXTENDED RELEASE ORAL DAILY
Qty: 90 TABLET | Refills: 1 | Status: SHIPPED | OUTPATIENT
Start: 2022-07-11 | End: 2022-11-23

## 2022-07-11 NOTE — PROGRESS NOTES
"cc:  bloating    Subjective:     Brandie Hunter is a 21 y.o. female presenting for bloating      Patient presents to the office for bloating.  She states that she has abdominal pain after eating.  She states that she did have pain on the right side.  She states that she has had a poor diet.  She states that she does eat more of a fatty diet.   She states that she usually has to have a bowel movement 30-60 minutes after eating.  It does not burn.  She has cramping when she wakes up.  There is some pressure.  She has been having increased bowel movements. She states that she has been having this for the last 2 months.  She has had some epigastric pain.     Patient states that she is doing well with the pristiq.  She would like to continue.      Review of systems:  See above.   Denies any symptoms unless previously indicated.        Current Outpatient Medications:   •  dicyclomine (BENTYL) 10 MG Cap, Take 1 Capsule by mouth 4 Times a Day,Before Meals and at Bedtime., Disp: 120 Capsule, Rfl: 2  •  Desvenlafaxine Succinate ER 25 MG TABLET SR 24 HR, Take 1 Tablet by mouth every day., Disp: 90 Tablet, Rfl: 1    Allergies, past medical history, past surgical history, family history, social history reviewed and updated    Objective:     Vitals: BP (!) 90/58 (BP Location: Left arm, Patient Position: Sitting, BP Cuff Size: Adult)   Pulse 73   Temp 36.7 °C (98 °F) (Temporal)   Resp 20   Ht 1.6 m (5' 3\")   Wt 54.9 kg (121 lb)   LMP 06/21/2022   SpO2 97%   BMI 21.43 kg/m²   General: Alert, pleasant, NAD  EYES:   PERRL, EOMI, no icterus or pallor.  Conjunctivae and lids normal.   HENT:  Normocephalic.  External ears normal.  Neck supple.   Respiratory: Normal respiratory effort.    Abdomen: Not obese  Skin: Warm, dry, no rashes.  Musculoskeletal: Gait is normal.  Moves all extremities well.    Extremities: normal range of motion all extremities.   Neurological: No tremors, sensation grossly intact,  CN2-12 " intact.  Psych:  Affect/mood is normal, judgement is good, memory is intact, grooming is appropriate.    Assessment/Plan:     Brandie was seen today for gi problem.    Diagnoses and all orders for this visit:    Bloating  -     dicyclomine (BENTYL) 10 MG Cap; Take 1 Capsule by mouth 4 Times a Day,Before Meals and at Bedtime.  -     CRP HIGH SENSITIVE (CARDIAC); Future  -     HEP C VIRUS ANTIBODY; Future  -     MPO/LA-3 (ANCA) ABS; Future  -     IGA SERUM QUANT; Future  -     T-TRANSGLUTAMINASE (TTG) IGA; Future  -     CALPROTECTIN,FECAL; Future  -     URINALYSIS,CULTURE IF INDICATED; Future  -     URINE CULTURE(NEW); Future  Loose stools  -     dicyclomine (BENTYL) 10 MG Cap; Take 1 Capsule by mouth 4 Times a Day,Before Meals and at Bedtime.  -     CRP HIGH SENSITIVE (CARDIAC); Future  -     HEP C VIRUS ANTIBODY; Future  -     MPO/LA-3 (ANCA) ABS; Future  -     IGA SERUM QUANT; Future  -     T-TRANSGLUTAMINASE (TTG) IGA; Future  -     CALPROTECTIN,FECAL; Future  -     URINALYSIS,CULTURE IF INDICATED; Future  -     URINE CULTURE(NEW); Future  Generalized abdominal pain  -     dicyclomine (BENTYL) 10 MG Cap; Take 1 Capsule by mouth 4 Times a Day,Before Meals and at Bedtime.  -     CRP HIGH SENSITIVE (CARDIAC); Future  -     HEP C VIRUS ANTIBODY; Future  -     MPO/LA-3 (ANCA) ABS; Future  -     IGA SERUM QUANT; Future  -     T-TRANSGLUTAMINASE (TTG) IGA; Future  -     CALPROTECTIN,FECAL; Future  -     URINALYSIS,CULTURE IF INDICATED; Future  -     URINE CULTURE(NEW); Future    Labs been ordered to evaluate further.  We will try patient on dicyclomine and see if this helps with symptoms.  Medication discussed with patient.  Follow-up in 3 to 4 weeks, sooner if needed.    Moderate episode of recurrent major depressive disorder (HCC)  -     Desvenlafaxine Succinate ER 25 MG TABLET SR 24 HR; Take 1 Tablet by mouth every day.    medication refilled.    No follow-ups on file.    Please note that this dictation was created  using voice recognition software. I have made every reasonable attempt to correct obvious errors, but expect that there are errors of grammar and possible content that I did not discover before finalizing note.

## 2022-08-02 ENCOUNTER — HOSPITAL ENCOUNTER (OUTPATIENT)
Facility: MEDICAL CENTER | Age: 22
End: 2022-08-02
Attending: PHYSICIAN ASSISTANT
Payer: COMMERCIAL

## 2022-08-02 PROCEDURE — 87591 N.GONORRHOEAE DNA AMP PROB: CPT

## 2022-08-02 PROCEDURE — 87491 CHLMYD TRACH DNA AMP PROBE: CPT

## 2022-08-03 DIAGNOSIS — Z11.3 ROUTINE SCREENING FOR STI (SEXUALLY TRANSMITTED INFECTION): ICD-10-CM

## 2022-08-15 ENCOUNTER — OFFICE VISIT (OUTPATIENT)
Dept: URGENT CARE | Facility: PHYSICIAN GROUP | Age: 22
End: 2022-08-15
Payer: COMMERCIAL

## 2022-08-15 ENCOUNTER — HOSPITAL ENCOUNTER (OUTPATIENT)
Dept: LAB | Facility: MEDICAL CENTER | Age: 22
End: 2022-08-15
Attending: PHYSICIAN ASSISTANT
Payer: COMMERCIAL

## 2022-08-15 VITALS
HEART RATE: 75 BPM | RESPIRATION RATE: 16 BRPM | SYSTOLIC BLOOD PRESSURE: 116 MMHG | TEMPERATURE: 97.1 F | HEIGHT: 63 IN | DIASTOLIC BLOOD PRESSURE: 84 MMHG | BODY MASS INDEX: 21.09 KG/M2 | OXYGEN SATURATION: 98 % | WEIGHT: 119 LBS

## 2022-08-15 DIAGNOSIS — R10.84 GENERALIZED ABDOMINAL PAIN: ICD-10-CM

## 2022-08-15 DIAGNOSIS — R19.5 LOOSE STOOLS: ICD-10-CM

## 2022-08-15 DIAGNOSIS — R14.0 ABDOMINAL BLOATING: ICD-10-CM

## 2022-08-15 DIAGNOSIS — R14.0 BLOATING: ICD-10-CM

## 2022-08-15 LAB
AMORPH CRY #/AREA URNS HPF: PRESENT /HPF
APPEARANCE UR: ABNORMAL
BACTERIA #/AREA URNS HPF: ABNORMAL /HPF
BILIRUB UR QL STRIP.AUTO: NEGATIVE
CAOX CRY #/AREA URNS HPF: ABNORMAL /HPF
COLOR UR: YELLOW
CRP SERPL HS-MCNC: 0.2 MG/L (ref 0–3)
EPI CELLS #/AREA URNS HPF: ABNORMAL /HPF
GLUCOSE UR STRIP.AUTO-MCNC: NEGATIVE MG/DL
HCV AB SER QL: NORMAL
KETONES UR STRIP.AUTO-MCNC: ABNORMAL MG/DL
LEUKOCYTE ESTERASE UR QL STRIP.AUTO: NEGATIVE
MICRO URNS: ABNORMAL
NITRITE UR QL STRIP.AUTO: NEGATIVE
PH UR STRIP.AUTO: 5.5 [PH] (ref 5–8)
PROT UR QL STRIP: NEGATIVE MG/DL
RBC # URNS HPF: ABNORMAL /HPF
RBC UR QL AUTO: ABNORMAL
SP GR UR STRIP.AUTO: 1.03
UROBILINOGEN UR STRIP.AUTO-MCNC: 0.2 MG/DL
WBC #/AREA URNS HPF: ABNORMAL /HPF

## 2022-08-15 PROCEDURE — 81001 URINALYSIS AUTO W/SCOPE: CPT

## 2022-08-15 PROCEDURE — 99213 OFFICE O/P EST LOW 20 MIN: CPT | Performed by: FAMILY MEDICINE

## 2022-08-15 PROCEDURE — 87086 URINE CULTURE/COLONY COUNT: CPT

## 2022-08-15 PROCEDURE — 82784 ASSAY IGA/IGD/IGG/IGM EACH: CPT

## 2022-08-15 PROCEDURE — 83516 IMMUNOASSAY NONANTIBODY: CPT

## 2022-08-15 PROCEDURE — 86803 HEPATITIS C AB TEST: CPT

## 2022-08-15 PROCEDURE — 36415 COLL VENOUS BLD VENIPUNCTURE: CPT

## 2022-08-15 PROCEDURE — 86141 C-REACTIVE PROTEIN HS: CPT

## 2022-08-15 PROCEDURE — 86364 TISS TRNSGLTMNASE EA IG CLAS: CPT

## 2022-08-15 NOTE — PROGRESS NOTES
"  Subjective:      21 y.o. female presents to urgent care for abdominal pain and bloating that has been present for over one year. She was seen by her PCP is 7/11/2022 for the same issues.  CRP, hepatitis C, IgA, ANCA, TTG, Pro-Geronimo, and urinalysis were all ordered.  Unfortunately labs were not done yet.  She was also given a prescription for Bentyl, which she has yet to . This morning at work she was experiencing severe abdominal pain when she was putting pressure on her abdomen. The pain is intermittent and has been improving throughout the day, it's described as cramping, currently rated 4/10, was 8/10 this morning. She denies any changes to urinary urgency, frequency, dysuria, or hematuria. She has multiple bowel movements per day, no blood in the stool.  She denies any recent travel, antibiotic use, or change in diet.  She is currently sexually active with one, male partner, and they use condoms consistently for contraception.    She denies any other questions or concerns at this time.    Current problem list, medication, and past medical/surgical history were reviewed in Epic.    ROS  See HPI     Objective:      /84   Pulse 75   Temp 36.2 °C (97.1 °F) (Temporal)   Resp 16   Ht 1.6 m (5' 3\")   Wt 54 kg (119 lb)   LMP 07/25/2022 (Approximate)   SpO2 98%   BMI 21.08 kg/m²     Physical Exam  Constitutional:       General: She is not in acute distress.     Appearance: She is not diaphoretic.   Cardiovascular:      Rate and Rhythm: Normal rate and regular rhythm.      Heart sounds: Normal heart sounds.   Pulmonary:      Effort: Pulmonary effort is normal. No respiratory distress.      Breath sounds: Normal breath sounds.   Abdominal:      General: Bowel sounds are normal.      Palpations: Abdomen is soft.      Tenderness: There is abdominal tenderness (generalized). Negative signs include Hdz's sign, Rovsing's sign and McBurney's sign.   Neurological:      Mental Status: She is alert. "   Psychiatric:         Mood and Affect: Affect normal.         Judgment: Judgment normal.     Assessment/Plan:     1. Generalized abdominal pain  2. Abdominal bloating  Pain has already started to improve.  Does have a significant work-up pending through her PCP as well as prescription for Bentyl.  Patient encouraged to do both of these.  We also discussed FOD MAP diet.      Instructed to return to Urgent Care or nearest Emergency Department if symptoms fail to improve, for any change in condition, further concerns, or new concerning symptoms. Patient states understanding of the plan of care and discharge instructions.    Flaca Larios M.D.

## 2022-08-15 NOTE — LETTER
August 15, 2022    To Whom It May Concern:         This is confirmation that Michaelnelsy GERALDINE Hunter attended her scheduled appointment with Flaca Larios M.D. on 8/15/22.  She may return to work tomorrow without any restrictions.       If you have any questions please do not hesitate to call me at the phone number listed below.    Sincerely,          Flaca Larios M.D.  [phone]

## 2022-08-17 LAB
BACTERIA UR CULT: NORMAL
IGA SERPL-MCNC: 118 MG/DL (ref 68–408)
MYELOPEROXIDASE AB SER-ACNC: 0 AU/ML (ref 0–19)
PROTEINASE3 AB SER-ACNC: 2 AU/ML (ref 0–19)
SIGNIFICANT IND 70042: NORMAL
SITE SITE: NORMAL
SOURCE SOURCE: NORMAL
TTG IGA SER IA-ACNC: <2 U/ML (ref 0–3)

## 2022-08-22 ENCOUNTER — OFFICE VISIT (OUTPATIENT)
Dept: MEDICAL GROUP | Facility: CLINIC | Age: 22
End: 2022-08-22
Payer: COMMERCIAL

## 2022-08-22 VITALS
HEIGHT: 62 IN | DIASTOLIC BLOOD PRESSURE: 70 MMHG | HEART RATE: 95 BPM | OXYGEN SATURATION: 97 % | SYSTOLIC BLOOD PRESSURE: 110 MMHG | BODY MASS INDEX: 21.75 KG/M2 | WEIGHT: 118.2 LBS | RESPIRATION RATE: 16 BRPM | TEMPERATURE: 99.8 F

## 2022-08-22 DIAGNOSIS — R10.84 GENERALIZED ABDOMINAL PAIN: ICD-10-CM

## 2022-08-22 DIAGNOSIS — R14.0 BLOATING: ICD-10-CM

## 2022-08-22 PROCEDURE — 99213 OFFICE O/P EST LOW 20 MIN: CPT | Performed by: PHYSICIAN ASSISTANT

## 2022-08-22 RX ORDER — OMEPRAZOLE 40 MG/1
40 CAPSULE, DELAYED RELEASE ORAL DAILY
Qty: 30 CAPSULE | Refills: 3 | Status: SHIPPED | OUTPATIENT
Start: 2022-08-22 | End: 2022-08-31

## 2022-08-23 NOTE — PROGRESS NOTES
"cc:  abdominal pressure    Subjective:     Brandie Hunter is a 21 y.o. female presenting for abdominal pressure      Patient presents to the office for abdominal pressure.She states that it is worse in the morning.  She states that it will go away and it will come back.  Marijuana makes it better.  She has not been able to  the dicyclomine and states she will pick it up this weekend. She states that she will feel hungry but after she eats she will still have pain.     Review of systems:  See above.   Denies any symptoms unless previously indicated.        Current Outpatient Medications:     omeprazole (PRILOSEC) 40 MG delayed-release capsule, Take 1 Capsule by mouth every day., Disp: 30 Capsule, Rfl: 3    Desvenlafaxine Succinate ER 25 MG TABLET SR 24 HR, Take 1 Tablet by mouth every day., Disp: 90 Tablet, Rfl: 1    Allergies, past medical history, past surgical history, family history, social history reviewed and updated    Objective:     Vitals: /70 (BP Location: Left arm, Patient Position: Sitting, BP Cuff Size: Adult)   Pulse 95   Temp 37.7 °C (99.8 °F) (Temporal)   Resp 16   Ht 1.575 m (5' 2\")   Wt 53.6 kg (118 lb 3.2 oz)   LMP 07/17/2022 (Within Days)   SpO2 97%   BMI 21.62 kg/m²   General: Alert, pleasant, NAD  EYES:   PERRL, EOMI, no icterus or pallor.  Conjunctivae and lids normal.   HENT:  Normocephalic.  External ears normal.  Neck supple.  Respiratory: Normal respiratory effort.    Abdomen: Not obese.  Skin: Warm, dry, no rashes.  Musculoskeletal: Gait is normal.  Moves all extremities well.    Extremities: normal range of motion all extremities.   Neurological: No tremors, sensation grossly intact,  CN2-12 intact.  Psych:  Affect/mood is normal, judgement is good, memory is intact, grooming is appropriate.    Assessment/Plan:     Brandie was seen today for lab results and gi problem.    Diagnoses and all orders for this visit:    Bloating  -     omeprazole (PRILOSEC) 40 MG " delayed-release capsule; Take 1 Capsule by mouth every day.    Generalized abdominal pain  -     omeprazole (PRILOSEC) 40 MG delayed-release capsule; Take 1 Capsule by mouth every day.    Possible acid reflux.  Will have patient respond back in the next 2 weeks after she is started medication to let us know how she is doing.  If no significant improvement, may need to consider dicyclomine.  If no improvement with both medications, will need to refer to GI for further evaluation.      No follow-ups on file.    Please note that this dictation was created using voice recognition software. I have made every reasonable attempt to correct obvious errors, but expect that there are errors of grammar and possible content that I did not discover before finalizing note.

## 2022-08-30 ENCOUNTER — HOSPITAL ENCOUNTER (OUTPATIENT)
Facility: MEDICAL CENTER | Age: 22
End: 2022-08-30
Attending: PHYSICIAN ASSISTANT
Payer: COMMERCIAL

## 2022-08-30 ENCOUNTER — OFFICE VISIT (OUTPATIENT)
Dept: MEDICAL GROUP | Facility: CLINIC | Age: 22
End: 2022-08-30
Payer: COMMERCIAL

## 2022-08-30 VITALS
HEIGHT: 62 IN | OXYGEN SATURATION: 94 % | BODY MASS INDEX: 21.9 KG/M2 | TEMPERATURE: 98.9 F | SYSTOLIC BLOOD PRESSURE: 100 MMHG | DIASTOLIC BLOOD PRESSURE: 60 MMHG | HEART RATE: 79 BPM | RESPIRATION RATE: 18 BRPM | WEIGHT: 119 LBS

## 2022-08-30 DIAGNOSIS — K21.9 GASTROESOPHAGEAL REFLUX DISEASE WITHOUT ESOPHAGITIS: ICD-10-CM

## 2022-08-30 DIAGNOSIS — Z11.3 ROUTINE SCREENING FOR STI (SEXUALLY TRANSMITTED INFECTION): ICD-10-CM

## 2022-08-30 PROCEDURE — 99213 OFFICE O/P EST LOW 20 MIN: CPT | Performed by: PHYSICIAN ASSISTANT

## 2022-08-30 PROCEDURE — 87591 N.GONORRHOEAE DNA AMP PROB: CPT

## 2022-08-30 PROCEDURE — 87491 CHLMYD TRACH DNA AMP PROBE: CPT | Mod: 91

## 2022-08-30 PROCEDURE — 87591 N.GONORRHOEAE DNA AMP PROB: CPT | Mod: 91

## 2022-08-30 PROCEDURE — 87491 CHLMYD TRACH DNA AMP PROBE: CPT

## 2022-08-30 NOTE — LETTER
August 30, 2022       Patient: Brandie Hunter   YOB: 2000   Date of Visit: 8/30/2022         To Whom It May Concern:    Patient was seen in office today.  In my medical opinion, I recommend that Brandie Hunter return to full duty, no restrictions 8-    If you have any questions or concerns, please don't hesitate to call [phone]          Sincerely,          Avani Morris P.A.-C.  Electronically Signed

## 2022-08-31 ENCOUNTER — HOSPITAL ENCOUNTER (EMERGENCY)
Facility: MEDICAL CENTER | Age: 22
End: 2022-08-31
Attending: EMERGENCY MEDICINE
Payer: COMMERCIAL

## 2022-08-31 ENCOUNTER — APPOINTMENT (OUTPATIENT)
Dept: RADIOLOGY | Facility: MEDICAL CENTER | Age: 22
End: 2022-08-31
Attending: EMERGENCY MEDICINE
Payer: COMMERCIAL

## 2022-08-31 VITALS
WEIGHT: 121.03 LBS | DIASTOLIC BLOOD PRESSURE: 55 MMHG | RESPIRATION RATE: 18 BRPM | HEART RATE: 55 BPM | BODY MASS INDEX: 22.14 KG/M2 | TEMPERATURE: 97.9 F | SYSTOLIC BLOOD PRESSURE: 100 MMHG | OXYGEN SATURATION: 97 %

## 2022-08-31 DIAGNOSIS — R55 SYNCOPE, UNSPECIFIED SYNCOPE TYPE: ICD-10-CM

## 2022-08-31 DIAGNOSIS — Z11.3 ROUTINE SCREENING FOR STI (SEXUALLY TRANSMITTED INFECTION): ICD-10-CM

## 2022-08-31 LAB
ALBUMIN SERPL BCP-MCNC: 4.2 G/DL (ref 3.2–4.9)
ALBUMIN/GLOB SERPL: 1.7 G/DL
ALP SERPL-CCNC: 48 U/L (ref 30–99)
ALT SERPL-CCNC: 14 U/L (ref 2–50)
ANION GAP SERPL CALC-SCNC: 9 MMOL/L (ref 7–16)
APPEARANCE UR: CLEAR
AST SERPL-CCNC: 15 U/L (ref 12–45)
BASOPHILS # BLD AUTO: 0.5 % (ref 0–1.8)
BASOPHILS # BLD: 0.04 K/UL (ref 0–0.12)
BILIRUB SERPL-MCNC: 0.5 MG/DL (ref 0.1–1.5)
BILIRUB UR QL STRIP.AUTO: NEGATIVE
BUN SERPL-MCNC: 10 MG/DL (ref 8–22)
CALCIUM SERPL-MCNC: 9 MG/DL (ref 8.5–10.5)
CHLORIDE SERPL-SCNC: 102 MMOL/L (ref 96–112)
CO2 SERPL-SCNC: 25 MMOL/L (ref 20–33)
COLOR UR: YELLOW
CREAT SERPL-MCNC: 0.67 MG/DL (ref 0.5–1.4)
EKG IMPRESSION: NORMAL
EOSINOPHIL # BLD AUTO: 0.04 K/UL (ref 0–0.51)
EOSINOPHIL NFR BLD: 0.5 % (ref 0–6.9)
ERYTHROCYTE [DISTWIDTH] IN BLOOD BY AUTOMATED COUNT: 41.8 FL (ref 35.9–50)
GFR SERPLBLD CREATININE-BSD FMLA CKD-EPI: 127 ML/MIN/1.73 M 2
GLOBULIN SER CALC-MCNC: 2.5 G/DL (ref 1.9–3.5)
GLUCOSE SERPL-MCNC: 86 MG/DL (ref 65–99)
GLUCOSE UR STRIP.AUTO-MCNC: NEGATIVE MG/DL
HCG SERPL QL: NEGATIVE
HCT VFR BLD AUTO: 36 % (ref 37–47)
HGB BLD-MCNC: 12.3 G/DL (ref 12–16)
IMM GRANULOCYTES # BLD AUTO: 0.03 K/UL (ref 0–0.11)
IMM GRANULOCYTES NFR BLD AUTO: 0.4 % (ref 0–0.9)
KETONES UR STRIP.AUTO-MCNC: NEGATIVE MG/DL
LEUKOCYTE ESTERASE UR QL STRIP.AUTO: NEGATIVE
LYMPHOCYTES # BLD AUTO: 1.36 K/UL (ref 1–4.8)
LYMPHOCYTES NFR BLD: 18.2 % (ref 22–41)
MCH RBC QN AUTO: 31.4 PG (ref 27–33)
MCHC RBC AUTO-ENTMCNC: 34.2 G/DL (ref 33.6–35)
MCV RBC AUTO: 91.8 FL (ref 81.4–97.8)
MICRO URNS: NORMAL
MONOCYTES # BLD AUTO: 0.55 K/UL (ref 0–0.85)
MONOCYTES NFR BLD AUTO: 7.4 % (ref 0–13.4)
NEUTROPHILS # BLD AUTO: 5.46 K/UL (ref 2–7.15)
NEUTROPHILS NFR BLD: 73 % (ref 44–72)
NITRITE UR QL STRIP.AUTO: NEGATIVE
NRBC # BLD AUTO: 0 K/UL
NRBC BLD-RTO: 0 /100 WBC
PH UR STRIP.AUTO: 6.5 [PH] (ref 5–8)
PLATELET # BLD AUTO: 255 K/UL (ref 164–446)
PMV BLD AUTO: 9.6 FL (ref 9–12.9)
POTASSIUM SERPL-SCNC: 3.6 MMOL/L (ref 3.6–5.5)
PROT SERPL-MCNC: 6.7 G/DL (ref 6–8.2)
PROT UR QL STRIP: NEGATIVE MG/DL
RBC # BLD AUTO: 3.92 M/UL (ref 4.2–5.4)
RBC UR QL AUTO: NEGATIVE
SODIUM SERPL-SCNC: 136 MMOL/L (ref 135–145)
SP GR UR STRIP.AUTO: 1.01
TROPONIN T SERPL-MCNC: <6 NG/L (ref 6–19)
UROBILINOGEN UR STRIP.AUTO-MCNC: 0.2 MG/DL
WBC # BLD AUTO: 7.5 K/UL (ref 4.8–10.8)

## 2022-08-31 PROCEDURE — 700105 HCHG RX REV CODE 258: Performed by: EMERGENCY MEDICINE

## 2022-08-31 PROCEDURE — 93005 ELECTROCARDIOGRAM TRACING: CPT | Performed by: EMERGENCY MEDICINE

## 2022-08-31 PROCEDURE — 84703 CHORIONIC GONADOTROPIN ASSAY: CPT

## 2022-08-31 PROCEDURE — 81003 URINALYSIS AUTO W/O SCOPE: CPT

## 2022-08-31 PROCEDURE — 36415 COLL VENOUS BLD VENIPUNCTURE: CPT

## 2022-08-31 PROCEDURE — 80053 COMPREHEN METABOLIC PANEL: CPT

## 2022-08-31 PROCEDURE — 99285 EMERGENCY DEPT VISIT HI MDM: CPT

## 2022-08-31 PROCEDURE — 85025 COMPLETE CBC W/AUTO DIFF WBC: CPT

## 2022-08-31 PROCEDURE — 71045 X-RAY EXAM CHEST 1 VIEW: CPT

## 2022-08-31 PROCEDURE — 93005 ELECTROCARDIOGRAM TRACING: CPT

## 2022-08-31 PROCEDURE — 84484 ASSAY OF TROPONIN QUANT: CPT

## 2022-08-31 RX ORDER — SODIUM CHLORIDE 9 MG/ML
1000 INJECTION, SOLUTION INTRAVENOUS ONCE
Status: COMPLETED | OUTPATIENT
Start: 2022-08-31 | End: 2022-08-31

## 2022-08-31 RX ADMIN — SODIUM CHLORIDE 1000 ML: 9 INJECTION, SOLUTION INTRAVENOUS at 14:22

## 2022-08-31 NOTE — ED TRIAGE NOTES
Pt to triage .  Chief Complaint   Patient presents with    Near Syncopal     Pt states she had a sudden onset of dizziness and had a near syncopal episode while at work. Denies pain or recent illness but does states she is tapering off an antianxiety medication

## 2022-08-31 NOTE — ED PROVIDER NOTES
"ED Provider Note    Scribed for Lola Forde M.D. by Tita Ramírez. 8/31/2022  1:58 PM    Primary care provider: Avani Morris P.A.-C.  Means of arrival: Walk in  History obtained from: patient  History limited by: none  CHIEF COMPLAINT  Chief Complaint   Patient presents with    Near Syncopal     Pt states she had a sudden onset of dizziness and had a near syncopal episode while at work. Denies pain or recent illness but does states she is tapering off an antianxiety medication        HPI  Brandie Hunter is a 22 y.o. female who presents for evaluation after syncope onset today. Patient endorsees losing consciousness for \"just a few seconds\". She states she felt normal this morning, but while at work today she developed shortness of breath with associated lightheadedness and tunneling of her vision before she lost consciousness.  No palpitations.  No chest pain.  No headache.  No abdominal pain.  She said that the lightheadedness, shortness of breath and tunneling of her vision lasted about 30 seconds.  Once her vision went black that is when she passed out.  A coworker caught her.  She did not fall to the floor.  No injury.  Upon waking up she had sweating and pale.  After few minutes she is back to normal.  She is feeling improved now and all her symptoms are resolved now.  Patient states she had been standing for a while before this happened.  No position changes.  She was not exerting herself.  No history of similar symptoms in the past.  Denies chest pain, cough, hemoptysis, palpations, headaches, abdominal pain, back pain, nausea, dysuria, fevers, new diarrhea, falls, or head injury. She states she walks a lot at work but was not exercising or participating in strenuous activities before this episodes occurred. Patient denies eating breakfast this morning, but this is normal for her. She endorses drinking fluids. Denies new medications. Endorses frequent marijuana use and vaping. LMP 8/10. Patient denies any " recent long travels or car rides.  No pain or swelling in her legs.  No hemoptysis.  No personal or family history of DVT or PE.  She is not on any estrogen therapy or birth control pills.  She does not think she is pregnant.      REVIEW OF SYSTEMS  Pertinent positives include syncope, shortness of breath, dizziness, vision changes, and sweating.   Pertinent negatives include no chest pain, cough, hemoptysis, palpations, headaches, abdominal pain, back pain, nausea, dysuria, fevers or new diarrhea.   See HPI for further details. All other systems are negative.    PAST MEDICAL HISTORY  Past Medical History:   Diagnosis Date    Abdominal discomfort 2022    Anxiety     Bacterial vaginosis 2022    Depression     Head ache     Heart murmur     as a baby    High risk heterosexual behavior 2021    Ingrown toenail 10/6/2020    Irregular periods 2020    Menarche 2014    Nausea 10/6/2020    Swollen lymph nodes 2022    Vaginal discharge 10/28/2020    Weight loss 2020       FAMILY HISTORY  Family History   Problem Relation Age of Onset    Other Mother         migraine headaches    Migraines Mother     Diabetes Maternal Grandfather     No Known Problems Brother     Psychiatric Illness Maternal Aunt     Cancer Maternal Grandmother     Thyroid Maternal Grandmother     No Known Problems Brother        SOCIAL HISTORY  Social History     Tobacco Use    Smoking status: Former     Packs/day: 1.00     Years: 2.00     Pack years: 2.00     Types: Cigarettes     Start date:      Quit date:      Years since quittin.6    Smokeless tobacco: Never    Tobacco comments:     va-pe   Vaping Use    Vaping Use: Every day    Start date: 2017    Substances: Nicotine, THC, Flavoring, Nicotine - 50 mg?    Devices: Disposable   Substance Use Topics    Alcohol use: Not Currently     Comment: rare    Drug use: Yes     Frequency: 5.0 times per week     Types: Marijuana, Inhaled     Comment: vape and smoke       Social History     Substance and Sexual Activity   Drug Use Yes    Frequency: 5.0 times per week    Types: Marijuana, Inhaled    Comment: vape and smoke       SURGICAL HISTORY  History reviewed. No pertinent surgical history.    CURRENT MEDICATIONS  Current Outpatient Medications   Medication Instructions    Desvenlafaxine Succinate ER 25 MG TABLET SR 24 HR 1 Tablet, Oral, DAILY       ALLERGIES  No Known Allergies    PHYSICAL EXAM  VITAL SIGNS: /67   Pulse 65   Temp 36.7 °C (98.1 °F) (Temporal)   Resp 16   Wt 54.9 kg (121 lb 0.5 oz)   LMP 08/10/2022 (Exact Date)   SpO2 98%   BMI 22.14 kg/m²      Constitutional: Well developed, well nourished; No acute distress; Non-toxic appearance. Multiple piercing's.  HENT: Normocephalic, atraumatic; Bilateral external ears normal; Oropharynx with moist mucous membranes; No erythema or exudates in the posterior oropharynx.   Eyes: PERRL, EOMI, Conjunctiva normal. No discharge.   Neck:  Supple, nontender midline; No stridor; No nuchal rigidity.   Lymphatic: No cervical lymphadenopathy noted.   Cardiovascular: Regular rate and rhythm without murmurs, rubs, or gallop.   Thorax & Lungs: No respiratory distress, breath sounds clear to auscultation bilaterally without wheezing, rales or rhonchi. Nontender chest wall. No crepitus or subcutaneous air  Abdomen: Soft, nontender, bowel sounds normal. No obvious masses; No pulsatile masses; no rebound, guarding, or peritoneal signs.   Skin: Slightly pale, Good color; warm and dry without rash or petechia.  Back: Nontender, No CVA tenderness.   Extremities: Distal radial, dorsalis pedis, posterior tibial pulses are equal bilaterally; No edema; Nontender calves or saphenous, No cyanosis, No clubbing.   Musculoskeletal: Good range of motion in all major joints. No tenderness to palpation or major deformities noted.   Neurologic: Alert & oriented x 4, clear speech.      EKG  12 Lead EKG; Interpreted by me as shown below.      LABS/RADIOLOGY/PROCEDURES  Results for orders placed or performed during the hospital encounter of 08/31/22   CBC WITH DIFFERENTIAL   Result Value Ref Range    WBC 7.5 4.8 - 10.8 K/uL    RBC 3.92 (L) 4.20 - 5.40 M/uL    Hemoglobin 12.3 12.0 - 16.0 g/dL    Hematocrit 36.0 (L) 37.0 - 47.0 %    MCV 91.8 81.4 - 97.8 fL    MCH 31.4 27.0 - 33.0 pg    MCHC 34.2 33.6 - 35.0 g/dL    RDW 41.8 35.9 - 50.0 fL    Platelet Count 255 164 - 446 K/uL    MPV 9.6 9.0 - 12.9 fL    Neutrophils-Polys 73.00 (H) 44.00 - 72.00 %    Lymphocytes 18.20 (L) 22.00 - 41.00 %    Monocytes 7.40 0.00 - 13.40 %    Eosinophils 0.50 0.00 - 6.90 %    Basophils 0.50 0.00 - 1.80 %    Immature Granulocytes 0.40 0.00 - 0.90 %    Nucleated RBC 0.00 /100 WBC    Neutrophils (Absolute) 5.46 2.00 - 7.15 K/uL    Lymphs (Absolute) 1.36 1.00 - 4.80 K/uL    Monos (Absolute) 0.55 0.00 - 0.85 K/uL    Eos (Absolute) 0.04 0.00 - 0.51 K/uL    Baso (Absolute) 0.04 0.00 - 0.12 K/uL    Immature Granulocytes (abs) 0.03 0.00 - 0.11 K/uL    NRBC (Absolute) 0.00 K/uL   COMP METABOLIC PANEL   Result Value Ref Range    Sodium 136 135 - 145 mmol/L    Potassium 3.6 3.6 - 5.5 mmol/L    Chloride 102 96 - 112 mmol/L    Co2 25 20 - 33 mmol/L    Anion Gap 9.0 7.0 - 16.0    Glucose 86 65 - 99 mg/dL    Bun 10 8 - 22 mg/dL    Creatinine 0.67 0.50 - 1.40 mg/dL    Calcium 9.0 8.5 - 10.5 mg/dL    AST(SGOT) 15 12 - 45 U/L    ALT(SGPT) 14 2 - 50 U/L    Alkaline Phosphatase 48 30 - 99 U/L    Total Bilirubin 0.5 0.1 - 1.5 mg/dL    Albumin 4.2 3.2 - 4.9 g/dL    Total Protein 6.7 6.0 - 8.2 g/dL    Globulin 2.5 1.9 - 3.5 g/dL    A-G Ratio 1.7 g/dL   HCG QUAL SERUM   Result Value Ref Range    Beta-Hcg Qualitative Serum Negative Negative   URINALYSIS (UA)    Specimen: Urine   Result Value Ref Range    Color Yellow     Character Clear     Specific Gravity 1.008 <1.035    Ph 6.5 5.0 - 8.0    Glucose Negative Negative mg/dL    Ketones Negative Negative mg/dL    Protein Negative Negative mg/dL     Bilirubin Negative Negative    Urobilinogen, Urine 0.2 Negative    Nitrite Negative Negative    Leukocyte Esterase Negative Negative    Occult Blood Negative Negative    Micro Urine Req see below    TROPONIN   Result Value Ref Range    Troponin T <6 6 - 19 ng/L   ESTIMATED GFR   Result Value Ref Range    GFR (CKD-EPI) 127 >60 mL/min/1.73 m 2   EKG   Result Value Ref Range    Report       [facility] Emergency Dept.    Test Date:  2022  Pt Name:    GERARDO KISER               Department: ER  MRN:        [id number]                      Room:  Gender:     Female                       Technician: HRR  :        2000                   Requested By:ER TRIAGE PROTOCOL  Order #:    [id number]                    Reading MD: Lola Forde    Measurements  Intervals                                Axis  Rate:       69                           P:          76  KY:         128                          QRS:        68  QRSD:       88                           T:          15  QT:         376  QTc:        403    Interpretive Statements  Sinus rhythm rate 69  Normal intervals  Normal axis  No delta wave  No Brugada  T waves inverted III  Baseline wander in lead(s) I,III,aVL  No previous ECG available for comparison  Electronically Signed On 2022 15:08:12 PDT by Lola Forde      All labs reviewed by me.     DX-CHEST-PORTABLE (1 VIEW)   Final Result      No acute cardiopulmonary disease evident.      The radiologist's interpretations of all radiological studies have been reviewed by me.          COURSE & MEDICAL DECISION MAKING  Pertinent Labs & Imaging studies reviewed. (See chart for details)    1:58 PM - Patient seen and examined at bedside. Discussed plan of care, including labs and IV hydration. Patient agrees to the plan of care. The patient will be resuscitated with 1L NS IV. Ordered for Chest x-ray, CBC w/ diff, troponin, CMP, HCG qual to evaluate her symptoms.     Patient is ambulated to the  bathroom.  She says she feels completely back to normal.  No dizziness or lightheadedness with walking to bathroom.  No shortness of breath since that initial sensation right prior to onset of syncope.  Patient feels completely back to normal.  Vital signs are normal stable.      Patient presents to the ER complaining of syncopal events which occurred while at work.  She is been standing for quite some time prior to her event.  She had prodromal warning symptoms including some shortness of breath, lightheadedness, tunneling of her vision, blackening of her vision, and then syncope for several seconds.  A coworker caught her.  No injury.  She did not have any headache, chest pain, palpitations, abdominal pain or back pain prior to the event.  Upon waking up, she felt sweaty and a little pale but symptoms resolved quickly and within a few minutes he was back to normal.  No history of similar symptoms in the past.  EKG is nonacute.  No evidence of Brugada syndrome, prolonged QT, WPW, or any other arrhythmia.  No ectopy on the monitor.  Troponin is undetectable.  No evidence for myocarditis or pericarditis.  Chest x-ray is negative.  No evidence for cardiomegaly.  She is not pregnant.  She is not anemic.  She is PERC negative with a score of 0.  I do not think this is PE.  Electrolytes are normal.  She does not appear to be dehydrated.  She did not have breakfast today.  She has been tapering off her anxiety medication.  She has been slowly tapering for the last week and a half.  She is under the care of her doctor for this taper.  She says can take several months to get her completely off of the medication.  I do not really think this caused her symptoms today, but it is possible.  At this time I think she had a vasovagal episode.  I encouraged her to stop smoking marijuana and stop vaping.  She is encouraged to drink lots of fluids.  At this time I do not think there is anything dangerous going on.  She feels  completely fine and back to normal here in the ER.  She is ambulate to the bathroom without difficulty.  She is asymptomatic upon discharge.  I referred her to cardiology.  Referral has been placed on her behalf in the computer.  She is to follow-up with them this week if possible.  This time I think the patient is safe and stable for outpatient management discharge home.  She has been given strict return precautions and discharge instructions and she understands treatment plan and follow-up.    The patient will return for new or worsening symptoms and is stable at the time of discharge.    DISPOSITION:  Patient will be discharged home in stable condition.    FOLLOW UP:  Avani Morris P.A.-C.  [address]  [facility] [id number] [phone]    Schedule an appointment as soon as possible for a visit in 1 day  If symptoms worsen return to ER    OUTPATIENT MEDICATIONS:  Discharge Medication List as of 8/31/2022  4:59 PM           FINAL IMPRESSION  1. Syncope, unspecified syncope type Acute   Suspect vasovagal syncope     Tita VINCENT (David), am scribing for, and in the presence of, Lola Forde M.D..    Electronically signed by: Tita Ramírez (David), 8/31/2022    Lola VINCENT M.D. personally performed the services described in this documentation, as scribed by Tita Ramírez in my presence, and it is both accurate and complete.    This dictation has been created using voice recognition software. The accuracy of the dictation is limited by the abilities of the software. I expect there may be some errors of grammar and possibly content. I made every attempt to manually correct the errors within my dictation. However, errors related to voice recognition software may still exist and should be interpreted within the appropriate context.    The note accurately reflects work and decisions made by me.  Lola Forde M.D.  8/31/2022  6:47 PM

## 2022-08-31 NOTE — DISCHARGE INSTRUCTIONS
Return to ER for worsening lightheadedness, recurrent shortness of breath, headache, chest pain, palpitations, cough, coughing or phlegm or blood, abdominal pain, recurrent episodes of passing out, or for any concerns.     Drink plenty of fluids to stay hydrated. Eat well balanced diet.    Stop smoking marijuana and stop vaping!    Follow up with your regular doctor and with [facility] this week.  Please call for appointment.  A referral to the cardiologist has been placed on your behalf.

## 2022-08-31 NOTE — PROGRESS NOTES
"cc:  STI testing    Subjective:     Brandie Hunter is a 22 y.o. female presenting for STI testing      Patient presents to the office for STI testing.  Patient states that she would like to be screened for gc chlamydia.  She is not wanting any further lab work at this time.  Her concern is that she may have been exposed specifically to this and was requesting further testing.    Patient states that she is doing well with the omeprazole.  However, she did have some stomach issues.  She states that she did call in today and is requesting a note.  She is pleased with how well the omeprazole is doing and would like to continue with that at this time.       Review of systems:  See above.   Denies any symptoms unless previously indicated.        Current Outpatient Medications:     omeprazole (PRILOSEC) 40 MG delayed-release capsule, Take 1 Capsule by mouth every day., Disp: 30 Capsule, Rfl: 3    Desvenlafaxine Succinate ER 25 MG TABLET SR 24 HR, Take 1 Tablet by mouth every day., Disp: 90 Tablet, Rfl: 1    Allergies, past medical history, past surgical history, family history, social history reviewed and updated    Objective:     Vitals: /60 (BP Location: Left arm, Patient Position: Sitting, BP Cuff Size: Small adult)   Pulse 79   Temp 37.2 °C (98.9 °F) (Temporal)   Resp 18   Ht 1.575 m (5' 2\")   Wt 54 kg (119 lb)   LMP 08/10/2022 (Exact Date)   SpO2 94%   BMI 21.77 kg/m²   General: Alert, pleasant, NAD  EYES:   PERRL, EOMI, no icterus or pallor.  Conjunctivae and lids normal.   HENT:  Normocephalic.  External ears normal.  Neck supple.    Respiratory: Normal respiratory effort.    Abdomen: Not obese  Skin: Warm, dry, no rashes.  Musculoskeletal: Gait is normal.  Moves all extremities well.    Extremities: normal range of motion all extremities.   Neurological: No tremors, sensation grossly intact,  CN2-12 intact.  Psych:  Affect/mood is normal, judgement is good, memory is intact, grooming is " appropriate.    Assessment/Plan:     Brandie was seen today for sexually transmitted diseases.    Diagnoses and all orders for this visit:    Routine screening for STI (sexually transmitted infection)  -     Chlamydia/GC, PCR (Urine); Future    Urine collected and will send to lab for further analysis.  Will notify patient of results when received.    Gastroesophageal reflux disease without esophagitis    Doing well with medication.  Follow-up in 3 months.  We will determine at that time if we need to wean off medication or if we continue with current dose.      Return in about 3 months (around 11/30/2022), or if symptoms worsen or fail to improve.    Please note that this dictation was created using voice recognition software. I have made every reasonable attempt to correct obvious errors, but expect that there are errors of grammar and possible content that I did not discover before finalizing note.

## 2022-09-01 NOTE — ED NOTES
Discharge instructions discussed with pt, verbalizes understanding. PIV removed. All belongings with pt when leaving unit. Pt amb to lobby with steady gait.

## 2022-09-05 LAB
C TRACH DNA SPEC QL NAA+PROBE: NEGATIVE
C TRACH DNA SPEC QL NAA+PROBE: NORMAL
N GONORRHOEA DNA SPEC QL NAA+PROBE: NEGATIVE
N GONORRHOEA DNA SPEC QL NAA+PROBE: NORMAL
SPECIMEN SOURCE: NORMAL
SPECIMEN SOURCE: NORMAL

## 2022-11-23 ENCOUNTER — OFFICE VISIT (OUTPATIENT)
Dept: MEDICAL GROUP | Facility: CLINIC | Age: 22
End: 2022-11-23
Payer: COMMERCIAL

## 2022-11-23 VITALS
DIASTOLIC BLOOD PRESSURE: 58 MMHG | RESPIRATION RATE: 20 BRPM | HEART RATE: 71 BPM | HEIGHT: 62 IN | SYSTOLIC BLOOD PRESSURE: 100 MMHG | TEMPERATURE: 98.2 F | BODY MASS INDEX: 21.75 KG/M2 | WEIGHT: 118.2 LBS | OXYGEN SATURATION: 97 %

## 2022-11-23 DIAGNOSIS — K21.9 GASTROESOPHAGEAL REFLUX DISEASE WITHOUT ESOPHAGITIS: ICD-10-CM

## 2022-11-23 DIAGNOSIS — R10.84 GENERALIZED ABDOMINAL PAIN: ICD-10-CM

## 2022-11-23 DIAGNOSIS — R14.0 BLOATING: ICD-10-CM

## 2022-11-23 PROCEDURE — 99214 OFFICE O/P EST MOD 30 MIN: CPT | Performed by: PHYSICIAN ASSISTANT

## 2022-11-23 RX ORDER — OMEPRAZOLE 20 MG/1
20 CAPSULE, DELAYED RELEASE ORAL DAILY
Qty: 90 CAPSULE | Refills: 1 | Status: SHIPPED | OUTPATIENT
Start: 2022-11-23 | End: 2023-06-15

## 2022-11-23 ASSESSMENT — FIBROSIS 4 INDEX: FIB4 SCORE: 0.33

## 2022-11-23 ASSESSMENT — LIFESTYLE VARIABLES
HAVE YOU EVER GOTTEN IN TROUBLE WHILE YOU WERE USING ALCOHOL OR DRUGS: NO
DO YOU EVER USE ALCOHOL OR DRUGS WHILE YOU ARE BY YOURSELF ALONE: YES
DO YOU EVER USE ALCOHOL OR DRUGS TO RELAX, FEEL BETTER ABOUT YOURSELF, OR FIT IN: NO
DURING THE PAST 12 MONTHS, ON HOW MANY DAYS DID YOU USE ANY MARIJUANA: 365
DURING THE PAST 12 MONTHS, ON HOW MANY DAYS DID YOU DRINK MORE THAN A FEW SIPS OF BEER, WINE, OR ANY DRINK CONTAINING ALCOHOL: 10
PART A TOTAL SCORE: 375
DURING THE PAST 12 MONTHS, ON HOW MANY DAYS DID YOU USE ANY TOBACCO OR NICOTINE PRODUCTS: 0
DURING THE PAST 12 MONTHS, ON HOW MANY DAYS DID YOU USE ANYTHING ELSE TO GET HIGH: 0
HAVE YOU EVER RIDDEN IN A CAR DRIVEN BY SOMEONE WHO WAS HIGH OR HAD BEEN USING ALCOHOL OR DRUGS: NO
DO YOUR FAMILY OR FRIENDS EVER TELL YOU THAT YOU SHOULD CUT DOWN ON YOUR DRINKING OR DRUG USE: NO
DO YOU EVER FORGET THINGS YOU DID WHILE USING ALCOHOL OR DRUGS: NO

## 2022-11-24 NOTE — PROGRESS NOTES
Chief Complaint   Patient presents with    Chest Pain     Pt sts they started about 3 weeks ago she sts she thinks it might be from vaping  pt sts she feels the pain mostly in the middle area of her chest and it switches from side to side  pt sts comes and goes but is frequent     GI Problem     Pt sts she has had stomach pain but she thinks its related to her stomach acid problems she tss she has discussed this with lili a few times        HISTORY OF PRESENT ILLNESS: Patient is a 22 y.o. female established patient who presents today to discuss the following issues:    Assessment/Plan  Gastroesophageal reflux disease without esophagitis  Presents today with intermittent burning pain in the center of her chest that comes and goes. She is also complaining of bloating and generalized abdominal pain. She has discussed the possibility of GERD with her PCP in the past. We will give a trial of omeprazole 20 mg daily. She has been instructed to take the medication daily even if she is not having symptoms. She will follow up with her PCP for reevaluation in one week.       Reviewed risks and benefits of treatment plan. Patient verbally agrees to plan of care.     Patient Active Problem List    Diagnosis Date Noted    Gastroesophageal reflux disease without esophagitis 08/30/2022    Bloating 07/11/2022    Loose stools 07/11/2022    Generalized abdominal pain 07/11/2022    Cough 12/22/2021    Environmental and seasonal allergies 12/01/2021    Nasal congestion 12/01/2021    Hair loss 12/21/2020    Routine screening for STI (sexually transmitted infection) 10/06/2020    Abnormal WBC count 06/16/2020    Anxiety 06/04/2020    Current moderate episode of major depressive disorder (HCC) 06/04/2020    Encounter for surveillance of implantable subdermal contraceptive 07/10/2017       Allergies:Patient has no known allergies.    Current Outpatient Medications   Medication Sig Dispense Refill    omeprazole (PRILOSEC) 20 MG  "delayed-release capsule Take 1 Capsule by mouth every day. 90 Capsule 1     No current facility-administered medications for this visit.       Wt Readings from Last 3 Encounters:   11/23/22 53.6 kg (118 lb 3.2 oz)   08/31/22 54.9 kg (121 lb 0.5 oz)   08/30/22 54 kg (119 lb)   ]  Patient's last menstrual period was 11/23/2022 (exact date).    Exam:  /58 (BP Location: Left arm, Patient Position: Sitting, BP Cuff Size: Adult)   Pulse 71   Temp 36.8 °C (98.2 °F) (Temporal)   Resp 20   Ht 1.575 m (5' 2\")   Wt 53.6 kg (118 lb 3.2 oz)   SpO2 97%  Body mass index is 21.62 kg/m².   General:  Well nourished, well developed female. No apparent distress. Not ill appearing.  Eyes: EOM intact, PERRL, conjunctiva non-injected, sclera non-icteric.  Neck: Supple with no cervical lymphadenopathy, JVD, palpable thyroid nodules or carotid bruits.  Pulmonary: Clear to ausculation bilaterally. Normal effort. No rales, ronchi, or wheezing.  Cardiovascular: Regular rate and rhythm without murmur, rub or gallop.   Abdomen:  Soft, non-distended, non-tender with normal bowel sounds. No CVA tenderness  Extremities: Full range of motion. Warm and well perfused with no edema.  Skin: Intact with no obvious rashes or lesions.  Neuro: Cranial nerves I-XII grossly intact.  Psych: Alert and oriented x 3.  Appropriately dressed. Mood and affect appropriate.    Return in about 8 days (around 12/1/2022).  Please note that this dictation was created using voice recognition software. I have made every reasonable attempt to correct obvious errors, but I expect that there are errors of grammar and possibly content that I did not discover before finalizing the note.    "

## 2022-11-28 NOTE — ASSESSMENT & PLAN NOTE
Presents today with intermittent burning pain in the center of her chest that comes and goes. She is also complaining of bloating and generalized abdominal pain. She has discussed the possibility of GERD with her PCP in the past. We will give a trial of omeprazole 20 mg daily. She has been instructed to take the medication daily even if she is not having symptoms. She will follow up with her PCP for reevaluation in one week.

## 2022-12-13 ENCOUNTER — APPOINTMENT (OUTPATIENT)
Dept: RADIOLOGY | Facility: MEDICAL CENTER | Age: 22
End: 2022-12-13
Attending: EMERGENCY MEDICINE
Payer: COMMERCIAL

## 2022-12-13 ENCOUNTER — HOSPITAL ENCOUNTER (EMERGENCY)
Facility: MEDICAL CENTER | Age: 22
End: 2022-12-13
Attending: EMERGENCY MEDICINE
Payer: COMMERCIAL

## 2022-12-13 VITALS
DIASTOLIC BLOOD PRESSURE: 72 MMHG | SYSTOLIC BLOOD PRESSURE: 115 MMHG | OXYGEN SATURATION: 99 % | WEIGHT: 118.39 LBS | RESPIRATION RATE: 18 BRPM | TEMPERATURE: 97.1 F | HEART RATE: 71 BPM | HEIGHT: 62 IN | BODY MASS INDEX: 21.79 KG/M2

## 2022-12-13 DIAGNOSIS — R07.9 CHEST PAIN, UNSPECIFIED TYPE: ICD-10-CM

## 2022-12-13 LAB
ANION GAP SERPL CALC-SCNC: 12 MMOL/L (ref 7–16)
BASOPHILS # BLD AUTO: 1 % (ref 0–1.8)
BASOPHILS # BLD: 0.07 K/UL (ref 0–0.12)
BUN SERPL-MCNC: 7 MG/DL (ref 8–22)
CALCIUM SERPL-MCNC: 9.4 MG/DL (ref 8.5–10.5)
CHLORIDE SERPL-SCNC: 106 MMOL/L (ref 96–112)
CO2 SERPL-SCNC: 21 MMOL/L (ref 20–33)
CREAT SERPL-MCNC: 0.56 MG/DL (ref 0.5–1.4)
D DIMER PPP IA.FEU-MCNC: 0.43 UG/ML (FEU) (ref 0–0.5)
EKG IMPRESSION: NORMAL
EOSINOPHIL # BLD AUTO: 0.05 K/UL (ref 0–0.51)
EOSINOPHIL NFR BLD: 0.7 % (ref 0–6.9)
ERYTHROCYTE [DISTWIDTH] IN BLOOD BY AUTOMATED COUNT: 43 FL (ref 35.9–50)
GFR SERPLBLD CREATININE-BSD FMLA CKD-EPI: 133 ML/MIN/1.73 M 2
GLUCOSE SERPL-MCNC: 87 MG/DL (ref 65–99)
HCT VFR BLD AUTO: 39.5 % (ref 37–47)
HGB BLD-MCNC: 13.3 G/DL (ref 12–16)
IMM GRANULOCYTES # BLD AUTO: 0.01 K/UL (ref 0–0.11)
IMM GRANULOCYTES NFR BLD AUTO: 0.1 % (ref 0–0.9)
LYMPHOCYTES # BLD AUTO: 2.69 K/UL (ref 1–4.8)
LYMPHOCYTES NFR BLD: 39.4 % (ref 22–41)
MCH RBC QN AUTO: 30.5 PG (ref 27–33)
MCHC RBC AUTO-ENTMCNC: 33.7 G/DL (ref 33.6–35)
MCV RBC AUTO: 90.6 FL (ref 81.4–97.8)
MONOCYTES # BLD AUTO: 0.43 K/UL (ref 0–0.85)
MONOCYTES NFR BLD AUTO: 6.3 % (ref 0–13.4)
NEUTROPHILS # BLD AUTO: 3.58 K/UL (ref 2–7.15)
NEUTROPHILS NFR BLD: 52.5 % (ref 44–72)
NRBC # BLD AUTO: 0 K/UL
NRBC BLD-RTO: 0 /100 WBC
PLATELET # BLD AUTO: 254 K/UL (ref 164–446)
PMV BLD AUTO: 10.1 FL (ref 9–12.9)
POTASSIUM SERPL-SCNC: 3.7 MMOL/L (ref 3.6–5.5)
RBC # BLD AUTO: 4.36 M/UL (ref 4.2–5.4)
SODIUM SERPL-SCNC: 139 MMOL/L (ref 135–145)
TROPONIN T SERPL-MCNC: <6 NG/L (ref 6–19)
WBC # BLD AUTO: 6.8 K/UL (ref 4.8–10.8)

## 2022-12-13 PROCEDURE — 80048 BASIC METABOLIC PNL TOTAL CA: CPT

## 2022-12-13 PROCEDURE — 93005 ELECTROCARDIOGRAM TRACING: CPT | Performed by: EMERGENCY MEDICINE

## 2022-12-13 PROCEDURE — 85379 FIBRIN DEGRADATION QUANT: CPT

## 2022-12-13 PROCEDURE — 71045 X-RAY EXAM CHEST 1 VIEW: CPT

## 2022-12-13 PROCEDURE — 93005 ELECTROCARDIOGRAM TRACING: CPT

## 2022-12-13 PROCEDURE — 84484 ASSAY OF TROPONIN QUANT: CPT

## 2022-12-13 PROCEDURE — 85025 COMPLETE CBC W/AUTO DIFF WBC: CPT

## 2022-12-13 PROCEDURE — 99283 EMERGENCY DEPT VISIT LOW MDM: CPT

## 2022-12-13 PROCEDURE — 36415 COLL VENOUS BLD VENIPUNCTURE: CPT

## 2022-12-13 RX ORDER — NAPROXEN 500 MG/1
500 TABLET ORAL 2 TIMES DAILY WITH MEALS
Qty: 6 TABLET | Refills: 0 | Status: SHIPPED | OUTPATIENT
Start: 2022-12-13 | End: 2023-06-15

## 2022-12-13 ASSESSMENT — FIBROSIS 4 INDEX: FIB4 SCORE: 0.33

## 2022-12-13 NOTE — ED TRIAGE NOTES
Chief Complaint   Patient presents with    Chest Pain     Patient reports chest pain for the last week. Patient reports history of acid in her stomach issues. Patient reports pain worst with movement.

## 2022-12-14 NOTE — ED NOTES
20gPIV established to patient's LAc.  PATIENT verified correct patient name and  on labeled specimen.  Blood collected and sent to lab.  This RN provided possible lab wait times.    IV is saline locked at this time.      PT updated on POC and is agreeable. Denies needs or questions at this time.

## 2022-12-14 NOTE — ED PROVIDER NOTES
"ER Provider Note     Scribed for Hussain Gonzáles M.D. by Ariella Cano. 2022, 4:52 PM.    Primary Care Provider: Avani Morris P.A.-C.  Means of Arrival: walk-in   History obtained from: Patient  History limited by: None     CHIEF COMPLAINT  Chief Complaint   Patient presents with    Chest Pain     Patient reports chest pain for the last week. Patient reports history of acid in her stomach issues. Patient reports pain worst with movement.        HPI  Brandie Hunter is a 22 y.o. female who presents to the Emergency Department for valuation of chest pain onset last night. She states that when at work today her back right side lung felt like it was going to \"pop\". She denies any radiating pain. She denies any vomiting, syncope, palpations, or dizziness. She follows with a doctor for GI issues. Her pain is exacerbated with movement. She denies taking any birth control.     REVIEW OF SYSTEMS  See HPI for further details. All other systems are negative.     PAST MEDICAL HISTORY   has a past medical history of Abdominal discomfort (2022), Anxiety, Bacterial vaginosis (2022), Depression, Head ache, Heart murmur, High risk heterosexual behavior (2021), Ingrown toenail (10/6/2020), Irregular periods (2020), Menarche (2014), Nausea (10/6/2020), Swollen lymph nodes (2022), Vaginal discharge (10/28/2020), and Weight loss (2020).    SURGICAL HISTORY  patient denies any surgical history    SOCIAL HISTORY  Social History     Tobacco Use    Smoking status: Former     Packs/day: 1.00     Years: 2.00     Pack years: 2.00     Types: Cigarettes     Start date:      Quit date: 2020     Years since quittin.9    Smokeless tobacco: Never    Tobacco comments:     [facility]   Vaping Use    Vaping Use: Every day    Start date: 2017    Substances: Nicotine, THC, Flavoring, Nicotine - 50 mg?    Devices: Disposable   Substance Use Topics    Alcohol use: Not Currently     " "Comment: rare    Drug use: Yes     Frequency: 5.0 times per week     Types: Marijuana, Inhaled     Comment: vape and smoke      Social History     Substance and Sexual Activity   Drug Use Yes    Frequency: 5.0 times per week    Types: Marijuana, Inhaled    Comment: vape and smoke       FAMILY HISTORY  Family History   Problem Relation Age of Onset    Other Mother         migraine headaches    Migraines Mother     Diabetes Maternal Grandfather     No Known Problems Brother     Psychiatric Illness Maternal Aunt     Cancer Maternal Grandmother     Thyroid Maternal Grandmother     No Known Problems Brother        CURRENT MEDICATIONS  Home Medications       Reviewed by Rosa Briseno R.N. (Registered Nurse) on 12/13/22 at 1506  Med List Status: Partial     Medication Last Dose Status   omeprazole (PRILOSEC) 20 MG delayed-release capsule  Active                    ALLERGIES  No Known Allergies    PHYSICAL EXAM  VITAL SIGNS: /72   Pulse 70   Temp 37 °C (98.6 °F) (Temporal)   Resp 18   Ht 1.575 m (5' 2\")   Wt 53.7 kg (118 lb 6.2 oz)   LMP 11/23/2022 (Exact Date)   SpO2 99%   BMI 21.65 kg/m²      Constitutional: Alert in no apparent distress.  HENT: No signs of trauma, Bilateral external ears normal, Nose normal.   Eyes: Pupils are equal and reactive, Conjunctiva normal, Non-icteric.   Neck: Normal range of motion, No tenderness, Supple, No stridor.   Lymphatic: No lymphadenopathy noted.   Cardiovascular: Regular rate and rhythm, no palpable thrill  Thorax & Lungs: No respiratory distress,  No chest tenderness.  CTAB  Abdomen: Bowel sounds normal, Soft, No tenderness, No masses, No pulsatile masses. No peritoneal signs.  Skin: Warm, Dry, No erythema, No rash.   Back: No bony tenderness, No CVA tenderness.   Extremities: Intact distal pulses, No edema, No tenderness, No cyanosis.  Musculoskeletal: Good range of motion in all major joints. No tenderness to palpation or major deformities noted.   Neurologic: Alert " , Normal motor function, Normal sensory function, No focal deficits noted.  Psychiatric: Affect normal, Judgment normal, Mood normal.     DIAGNOSTIC STUDIES / PROCEDURES    EKG Interpretation:  Interpreted by me    12 Lead EKG interpreted by me to show:  Normal sinus rhythm  Rate 64  Axis: Normal  Intervals: Normal  Normal T waves. No T wave changes from prior  Normal ST segments  My impression of this EKG: Does not indicate ischemia or arrhythmia at this time.     LABS  Labs Reviewed   BASIC METABOLIC PANEL - Abnormal; Notable for the following components:       Result Value    Bun 7 (*)     All other components within normal limits    Narrative:     Biotin intake of greater than 5 mg per day may interfere with  troponin levels, causing false low values.   CBC WITH DIFFERENTIAL    Narrative:     Biotin intake of greater than 5 mg per day may interfere with  troponin levels, causing false low values.   TROPONIN    Narrative:     Biotin intake of greater than 5 mg per day may interfere with  troponin levels, causing false low values.   D-DIMER    Narrative:     Biotin intake of greater than 5 mg per day may interfere with  troponin levels, causing false low values.   ESTIMATED GFR    Narrative:     Biotin intake of greater than 5 mg per day may interfere with  troponin levels, causing false low values.     All labs reviewed by me.    RADIOLOGY  DX-CHEST-PORTABLE (1 VIEW)   Final Result      No acute cardiopulmonary disease evident.         The radiologist's interpretation of all radiological studies have been reviewed by me.    COURSE & MEDICAL DECISION MAKING  Pertinent Labs & Imaging studies reviewed. (See chart for details)    This is a 22 y.o. female that presents chest pain.  Given the patient's pain we will get labs to evaluate for myocardial infarction as well as an x-ray to evaluate for pneumonia and pneumothorax as well as a D-dimer to evaluate for clot.  We will reassess after this.    4:52 PM - Patient  seen and examined at bedside. Ordered DX-chest, EKG, CBC w/ diff, eGFR, BMP, D-Dimer, and troponin.      6:14 PM - I reevaluated the patient at bedside. I discussed plan for discharge and follow up as outlined below. The patient is stable for discharge at this time and will return for any new or worsening symptoms. Patient verbalizes understanding and support with my plan for discharge.      D-dimer is negative.  The patient has a low heart score with a negative troponin.  There is no significant electrolyte derangements and no anemia.  Chest x-ray is negative.  We will discharge the patient home with strict return precautions and follow-up.    The patient will return for new or worsening symptoms and is stable at the time of discharge.    DISPOSITION:  Patient will be discharged home in stable condition.    FOLLOW UP:  Avani Morris P.A.-C.  [address] [address]  [phone]            OUTPATIENT MEDICATIONS:  New Prescriptions    NAPROXEN (NAPROSYN) 500 MG TAB    Take 1 Tablet by mouth 2 times a day with meals.         FINAL IMPRESSION  1. Chest pain, unspecified type          I, Ariella Cano (David), am scribing for, and in the presence of, Hussain Gonzáles M.D..    Electronically signed by: Ariella Cano (David), 12/13/2022    IHussain M.D. personally performed the services described in this documentation, as scribed by Ariella Cano in my presence, and it is both accurate and complete.     The note accurately reflects work and decisions made by me.  Hussain Gonzáles M.D.  12/13/2022  9:31 PM

## 2022-12-14 NOTE — ED NOTES
Pt to room, call light introduced. Pt ambulatory in NAD. No WOB. Skin PWD with MMM. Pt reports midline chest pain with no radiation. States she was recently seen by PCP but over the last 2 days her pain has been worsening. Denies emesis, denies palpitations. Denies dizziness or syncope. GCS 15. Gown provided to pt. Old and new EKG in pt chart.

## 2022-12-14 NOTE — ED NOTES
"Brandie Hunter has been discharged from the [facility] Emergency Room.    Discharge instructions, which include signs and symptoms to monitor patient for, as well as detailed information regarding non-specific chest pain provided.  All questions and concerns addressed at this time.      Follow up visit with PCP encouraged.  Dr. Morris's office contact information with phone number and address provided.     Prescription for Naproxen 500mg provided to patient. Pt educated on dosing, course, and use of medication.     Patient leaves ER in no apparent distress. This RN provided education regarding returning to the ER for any new concerns or changes in patient's condition.      /72   Pulse 71   Temp 37.3 °C (99.1 °F) (Temporal)   Resp 18   Ht 1.575 m (5' 2\")   Wt 53.7 kg (118 lb 6.2 oz)   LMP 11/23/2022 (Exact Date)   SpO2 99%   BMI 21.65 kg/m²     "

## 2023-01-26 ENCOUNTER — OFFICE VISIT (OUTPATIENT)
Dept: MEDICAL GROUP | Facility: CLINIC | Age: 23
End: 2023-01-26
Payer: COMMERCIAL

## 2023-01-26 ENCOUNTER — HOSPITAL ENCOUNTER (OUTPATIENT)
Facility: MEDICAL CENTER | Age: 23
End: 2023-01-26
Attending: PHYSICIAN ASSISTANT
Payer: COMMERCIAL

## 2023-01-26 VITALS
OXYGEN SATURATION: 97 % | SYSTOLIC BLOOD PRESSURE: 112 MMHG | HEIGHT: 62 IN | BODY MASS INDEX: 21.27 KG/M2 | WEIGHT: 115.6 LBS | DIASTOLIC BLOOD PRESSURE: 70 MMHG | TEMPERATURE: 99.1 F | RESPIRATION RATE: 16 BRPM | HEART RATE: 99 BPM

## 2023-01-26 DIAGNOSIS — Z11.3 ROUTINE SCREENING FOR STI (SEXUALLY TRANSMITTED INFECTION): ICD-10-CM

## 2023-01-26 DIAGNOSIS — N89.8 VAGINAL DISCHARGE: ICD-10-CM

## 2023-01-26 PROCEDURE — 99213 OFFICE O/P EST LOW 20 MIN: CPT | Performed by: PHYSICIAN ASSISTANT

## 2023-01-26 PROCEDURE — 87480 CANDIDA DNA DIR PROBE: CPT

## 2023-01-26 PROCEDURE — 87660 TRICHOMONAS VAGIN DIR PROBE: CPT

## 2023-01-26 PROCEDURE — 87491 CHLMYD TRACH DNA AMP PROBE: CPT

## 2023-01-26 PROCEDURE — 87510 GARDNER VAG DNA DIR PROBE: CPT

## 2023-01-26 PROCEDURE — 87591 N.GONORRHOEAE DNA AMP PROB: CPT

## 2023-01-26 RX ORDER — FLUCONAZOLE 100 MG/1
TABLET ORAL
Qty: 2 TABLET | Refills: 0 | Status: SHIPPED | OUTPATIENT
Start: 2023-01-26 | End: 2023-02-15 | Stop reason: SDUPTHER

## 2023-01-26 ASSESSMENT — PATIENT HEALTH QUESTIONNAIRE - PHQ9
9. THOUGHTS THAT YOU WOULD BE BETTER OFF DEAD, OR OF HURTING YOURSELF: NOT AT ALL
3. TROUBLE FALLING OR STAYING ASLEEP OR SLEEPING TOO MUCH: SEVERAL DAYS
5. POOR APPETITE OR OVEREATING: MORE THAN HALF THE DAYS
SUM OF ALL RESPONSES TO PHQ QUESTIONS 1-9: 10
4. FEELING TIRED OR HAVING LITTLE ENERGY: MORE THAN HALF THE DAYS
7. TROUBLE CONCENTRATING ON THINGS, SUCH AS READING THE NEWSPAPER OR WATCHING TELEVISION: SEVERAL DAYS
8. MOVING OR SPEAKING SO SLOWLY THAT OTHER PEOPLE COULD HAVE NOTICED. OR THE OPPOSITE, BEING SO FIGETY OR RESTLESS THAT YOU HAVE BEEN MOVING AROUND A LOT MORE THAN USUAL: SEVERAL DAYS
2. FEELING DOWN, DEPRESSED, IRRITABLE, OR HOPELESS: SEVERAL DAYS
6. FEELING BAD ABOUT YOURSELF - OR THAT YOU ARE A FAILURE OR HAVE LET YOURSELF OR YOUR FAMILY DOWN: SEVERAL DAYS
1. LITTLE INTEREST OR PLEASURE IN DOING THINGS: SEVERAL DAYS
SUM OF ALL RESPONSES TO PHQ9 QUESTIONS 1 AND 2: 2

## 2023-01-26 ASSESSMENT — FIBROSIS 4 INDEX: FIB4 SCORE: 0.33

## 2023-01-27 ENCOUNTER — HOSPITAL ENCOUNTER (OUTPATIENT)
Dept: LAB | Facility: MEDICAL CENTER | Age: 23
End: 2023-01-27
Attending: PHYSICIAN ASSISTANT
Payer: COMMERCIAL

## 2023-01-27 DIAGNOSIS — N89.8 VAGINAL DISCHARGE: ICD-10-CM

## 2023-01-27 DIAGNOSIS — Z11.3 ROUTINE SCREENING FOR STI (SEXUALLY TRANSMITTED INFECTION): ICD-10-CM

## 2023-01-27 LAB
HBV CORE AB SERPL QL IA: NONREACTIVE
HCV AB SER QL: NORMAL
HIV 1+2 AB+HIV1 P24 AG SERPL QL IA: NORMAL
T PALLIDUM AB SER QL IA: NORMAL

## 2023-01-27 PROCEDURE — 87480 CANDIDA DNA DIR PROBE: CPT

## 2023-01-27 PROCEDURE — 86704 HEP B CORE ANTIBODY TOTAL: CPT

## 2023-01-27 PROCEDURE — G0475 HIV COMBINATION ASSAY: HCPCS

## 2023-01-27 PROCEDURE — 87510 GARDNER VAG DNA DIR PROBE: CPT

## 2023-01-27 PROCEDURE — 86803 HEPATITIS C AB TEST: CPT

## 2023-01-27 PROCEDURE — 86694 HERPES SIMPLEX NES ANTBDY: CPT | Mod: 91

## 2023-01-27 PROCEDURE — 87491 CHLMYD TRACH DNA AMP PROBE: CPT

## 2023-01-27 PROCEDURE — 86780 TREPONEMA PALLIDUM: CPT

## 2023-01-27 PROCEDURE — 87591 N.GONORRHOEAE DNA AMP PROB: CPT

## 2023-01-27 PROCEDURE — 87660 TRICHOMONAS VAGIN DIR PROBE: CPT

## 2023-01-27 PROCEDURE — 36415 COLL VENOUS BLD VENIPUNCTURE: CPT

## 2023-01-27 NOTE — PROGRESS NOTES
"Subjective:     Brandie Hunter is a 22 y.o. female presenting for sti screen      Patient presents to the office for sti screen.  Patient is requesting to be screened for STI's.   Patient has had a white yellow discharge which has caused itching.  It has been going on for 2 weeks.  She has not tried anything.      Review of systems:  See above.   Denies any symptoms unless previously indicated.        Current Outpatient Medications:     fluconazole (DIFLUCAN) 100 MG Tab, Take one tab weekly for 2 weeks., Disp: 2 Tablet, Rfl: 0    naproxen (NAPROSYN) 500 MG Tab, Take 1 Tablet by mouth 2 times a day with meals. (Patient not taking: Reported on 1/26/2023), Disp: 6 Tablet, Rfl: 0    omeprazole (PRILOSEC) 20 MG delayed-release capsule, Take 1 Capsule by mouth every day. (Patient not taking: Reported on 1/26/2023), Disp: 90 Capsule, Rfl: 1    Allergies, past medical history, past surgical history, family history, social history reviewed and updated    Objective:     Vitals: /70 (BP Location: Right arm, Patient Position: Sitting, BP Cuff Size: Small adult)   Pulse 99   Temp 37.3 °C (99.1 °F) (Temporal)   Resp 16   Ht 1.562 m (5' 1.5\")   Wt 52.4 kg (115 lb 9.6 oz)   LMP 01/03/2023 (Exact Date)   SpO2 97%   BMI 21.49 kg/m²   General: Alert, pleasant, NAD  EYES:   PERRL, EOMI, no icterus or pallor.  Conjunctivae and lids normal.   HENT:  Normocephalic.  External ears normal.  Neck supple.   Respiratory: Normal respiratory effort.    Abdomen: Not obese  Pelvic exam: Normal external genitalia including urethral meatus.  Well supported, nontender urethra and bladder.  Vagina discharge present. White discharge.    Skin: Warm, dry, no rashes.  Musculoskeletal: Gait is normal.  Moves all extremities well.    Extremities: normal range of motion all extremities.   Neurological: No tremors, sensation grossly intact,  CN2-12 intact.  Psych:  Affect/mood is normal, judgement is good, memory is intact, grooming is " appropriate.    Assessment/Plan:     Brandie was seen today for exposure to std.    Diagnoses and all orders for this visit:    Routine screening for STI (sexually transmitted infection)  -     HIV AG/AB COMBO ASSAY DIAGNOSTIC; Future  -     RPR (SYPHILIS); Future  -     HEP B CORE AB TOTAL; Future  -     HEP C VIRUS ANTIBODY; Future  -     HSV I/II IGG & IGM SERUM; Future  -     fluconazole (DIFLUCAN) 100 MG Tab; Take one tab weekly for 2 weeks.    Vaginal discharge  -     VAGINAL PATHOGENS DNA PANEL; Future  -     fluconazole (DIFLUCAN) 100 MG Tab; Take one tab weekly for 2 weeks.      Cultures collected will be sent to the lab for analysis.  Lab work is also been ordered.  We will notify patient of test results when received.  In the meantime we we will order Diflucan as this may be a yeast infection.      No follow-ups on file.    Please note that this dictation was created using voice recognition software. I have made every reasonable attempt to correct obvious errors, but expect that there are errors of grammar and possible content that I did not discover before finalizing note.

## 2023-01-28 LAB
C TRACH DNA GENITAL QL NAA+PROBE: NEGATIVE
C TRACH DNA SPEC QL NAA+PROBE: NEGATIVE
CANDIDA DNA VAG QL PROBE+SIG AMP: NEGATIVE
CANDIDA DNA VAG QL PROBE+SIG AMP: NEGATIVE
G VAGINALIS DNA VAG QL PROBE+SIG AMP: NEGATIVE
G VAGINALIS DNA VAG QL PROBE+SIG AMP: NEGATIVE
N GONORRHOEA DNA GENITAL QL NAA+PROBE: NEGATIVE
N GONORRHOEA DNA SPEC QL NAA+PROBE: NEGATIVE
SPECIMEN SOURCE: NORMAL
SPECIMEN SOURCE: NORMAL
T VAGINALIS DNA VAG QL PROBE+SIG AMP: NEGATIVE
T VAGINALIS DNA VAG QL PROBE+SIG AMP: NEGATIVE

## 2023-01-30 LAB
HSV1+2 IGG SER IA-ACNC: >22.4 IV
HSV1+2 IGM SER IA-ACNC: 0.84 IV

## 2023-02-15 ENCOUNTER — OFFICE VISIT (OUTPATIENT)
Dept: MEDICAL GROUP | Facility: CLINIC | Age: 23
End: 2023-02-15
Payer: COMMERCIAL

## 2023-02-15 VITALS
TEMPERATURE: 98.4 F | BODY MASS INDEX: 21.27 KG/M2 | HEART RATE: 74 BPM | SYSTOLIC BLOOD PRESSURE: 110 MMHG | WEIGHT: 115.6 LBS | HEIGHT: 62 IN | DIASTOLIC BLOOD PRESSURE: 60 MMHG | RESPIRATION RATE: 20 BRPM | OXYGEN SATURATION: 89 %

## 2023-02-15 DIAGNOSIS — R53.81 MALAISE: ICD-10-CM

## 2023-02-15 DIAGNOSIS — J02.0 STREP THROAT: ICD-10-CM

## 2023-02-15 DIAGNOSIS — F33.1 MODERATE EPISODE OF RECURRENT MAJOR DEPRESSIVE DISORDER (HCC): ICD-10-CM

## 2023-02-15 DIAGNOSIS — N89.8 VAGINAL DISCHARGE: ICD-10-CM

## 2023-02-15 DIAGNOSIS — F41.9 ANXIETY: ICD-10-CM

## 2023-02-15 LAB
EXTERNAL QUALITY CONTROL: NORMAL
FLUAV+FLUBV AG SPEC QL IA: NEGATIVE
INT CON NEG: NEGATIVE
INT CON POS: POSITIVE
S PYO AG THROAT QL: POSITIVE
SARS-COV+SARS-COV-2 AG RESP QL IA.RAPID: NEGATIVE

## 2023-02-15 PROCEDURE — 87804 INFLUENZA ASSAY W/OPTIC: CPT | Performed by: PHYSICIAN ASSISTANT

## 2023-02-15 PROCEDURE — 87426 SARSCOV CORONAVIRUS AG IA: CPT | Performed by: PHYSICIAN ASSISTANT

## 2023-02-15 PROCEDURE — 99214 OFFICE O/P EST MOD 30 MIN: CPT | Performed by: PHYSICIAN ASSISTANT

## 2023-02-15 PROCEDURE — 87880 STREP A ASSAY W/OPTIC: CPT | Performed by: PHYSICIAN ASSISTANT

## 2023-02-15 RX ORDER — AMOXICILLIN 500 MG/1
500 CAPSULE ORAL 3 TIMES DAILY
Qty: 30 CAPSULE | Refills: 0 | Status: SHIPPED | OUTPATIENT
Start: 2023-02-15 | End: 2023-06-15

## 2023-02-15 RX ORDER — FLUCONAZOLE 100 MG/1
TABLET ORAL
Qty: 2 TABLET | Refills: 0 | Status: SHIPPED | OUTPATIENT
Start: 2023-02-15 | End: 2023-06-15

## 2023-02-15 ASSESSMENT — FIBROSIS 4 INDEX: FIB4 SCORE: 0.33

## 2023-02-15 NOTE — LETTER
February 15, 2023       Patient: Brandie Hunter   YOB: 2000   Date of Visit: 2/15/2023         To Whom It May Concern:    In my medical opinion, I recommend that Brandie Hunter return to work on 2-.    If you have any questions or concerns, please don't hesitate to call [phone]          Sincerely,          Avani Morris P.A.-C.  Electronically Signed

## 2023-02-16 NOTE — PROGRESS NOTES
"cc:  mucus    Subjective:     Brandie Hunter is a 22 y.o. female presenting for mucus      Patient presents to the office for mucus.  Patient states that this started last week.  She had a sore throat, she was having chills.  She states that her muscles hurt.  It is starting to improve.  She has been feeling more congested.  She did use theraflu which did help.       Patient is requesting a referral to [facility].  She is [place].  She understands that paperwork may be needed but would like to begin the process.    Patient is having a discharge.  She indicates that she was sexually active about a week ago and since then had some spotting.  However the spotting turned into a brown discharge which is now improving.  She is seeing less of it.  She is not sure if she needs to be concerned about discharge at this time.    Review of systems:  See above.   Denies any symptoms unless previously indicated.        Current Outpatient Medications:     amoxicillin (AMOXIL) 500 MG Cap, Take 1 Capsule by mouth 3 times a day., Disp: 30 Capsule, Rfl: 0    fluconazole (DIFLUCAN) 100 MG Tab, Take one tab weekly for 2 weeks., Disp: 2 Tablet, Rfl: 0    naproxen (NAPROSYN) 500 MG Tab, Take 1 Tablet by mouth 2 times a day with meals. (Patient not taking: Reported on 1/26/2023), Disp: 6 Tablet, Rfl: 0    omeprazole (PRILOSEC) 20 MG delayed-release capsule, Take 1 Capsule by mouth every day. (Patient not taking: Reported on 1/26/2023), Disp: 90 Capsule, Rfl: 1    Allergies, past medical history, past surgical history, family history, social history reviewed and updated    Objective:     Vitals: /60 (BP Location: Left arm, Patient Position: Sitting, BP Cuff Size: Adult)   Pulse 74   Temp 36.9 °C (98.4 °F) (Temporal)   Resp 20   Ht 1.562 m (5' 1.5\")   Wt 52.4 kg (115 lb 9.6 oz)   LMP 02/01/2023 (Exact Date)   SpO2 89%   BMI 21.49 kg/m²   General: Alert, pleasant, NAD  EYES:   PERRL, EOMI, no icterus or " pallor.  Conjunctivae and lids normal.   HENT:  Normocephalic.  External ears normal. Oropharynx non-erythematous, mucous membranes moist. Swollen tonsils.  Neck supple.    Respiratory: Normal respiratory effort.    Abdomen: Not obese  Skin: Warm, dry, no rashes.  Musculoskeletal: Gait is normal.  Moves all extremities well.    Extremities: normal range of motion all extremities.   Neurological: No tremors, sensation grossly intact,  gait is normal, CN2-12 intact.  Psych:  Affect/mood is normal, judgement is good, memory is intact, grooming is appropriate.  Rapid strep: positive  Rapid flu: negative.  Rapid covid: negative    Assessment/Plan:     Brandie was seen today for other.    Diagnoses and all orders for this visit:    Strep throat  -     amoxicillin (AMOXIL) 500 MG Cap; Take 1 Capsule by mouth 3 times a day.  -     fluconazole (DIFLUCAN) 100 MG Tab; Take one tab weekly for 2 weeks.  Malaise  -     POCT Rapid Strep A  -     POCT Influenza A/B  -     POCT SARS-COV Antigen LEORA (Symptomatic only)    Rapid strep is positive.  Rapid flu and COVID are negative.  We will treat with amoxicillin as indicated and she will contact me with any concerns.  We will provide a note to keep off of work for tomorrow.    Vaginal discharge  -     fluconazole (DIFLUCAN) 100 MG Tab; Take one tab weekly for 2 weeks.    After intercourse.  May be an irritation.  However we are using amoxicillin and we will provide Diflucan.  We will follow-up in a week if symptoms do not improve, may need to obtain cultures.    Moderate episode of recurrent major depressive disorder (HCC)  -     Referral to Behavioral Health  Anxiety  -     Referral to Behavioral Health    Referral has been resubmitted.  We will have patient follow-up in [facility] office next-door to obtain paperwork.        No follow-ups on file.    Please note that this dictation was created using voice recognition software. I have made every reasonable attempt to correct obvious errors,  but expect that there are errors of grammar and possible content that I did not discover before finalizing note.    CONSTITUTIONAL: Well-developed; well-nourished; in no acute distress.   SKIN: warm, dry  HEAD: Normocephalic; atraumatic.  EYES: no conjunctival injection. PERRL.   ENT: No nasal discharge; airway clear.  NECK: Supple; non tender.  CARD: S1, S2 normal; no murmurs, gallops, or rubs. Regular rate and rhythm.   RESP: No wheezes, rales or rhonchi.  ABD: soft ntnd  EXT: Normal ROM.  No clubbing, cyanosis or edema.   LYMPH: No acute cervical adenopathy.  NEURO: Alert, oriented, grossly unremarkable  PSYCH: Cooperative.

## 2023-04-05 ENCOUNTER — OFFICE VISIT (OUTPATIENT)
Dept: MEDICAL GROUP | Facility: CLINIC | Age: 23
End: 2023-04-05
Payer: COMMERCIAL

## 2023-04-05 ENCOUNTER — HOSPITAL ENCOUNTER (OUTPATIENT)
Facility: MEDICAL CENTER | Age: 23
End: 2023-04-05
Attending: PHYSICIAN ASSISTANT
Payer: COMMERCIAL

## 2023-04-05 VITALS
RESPIRATION RATE: 16 BRPM | BODY MASS INDEX: 21.16 KG/M2 | DIASTOLIC BLOOD PRESSURE: 70 MMHG | WEIGHT: 119.4 LBS | OXYGEN SATURATION: 98 % | SYSTOLIC BLOOD PRESSURE: 102 MMHG | HEART RATE: 69 BPM | HEIGHT: 63 IN

## 2023-04-05 DIAGNOSIS — Z11.3 ROUTINE SCREENING FOR STI (SEXUALLY TRANSMITTED INFECTION): ICD-10-CM

## 2023-04-05 PROBLEM — A64 STI (SEXUALLY TRANSMITTED INFECTION): Status: ACTIVE | Noted: 2023-04-05

## 2023-04-05 PROCEDURE — 99212 OFFICE O/P EST SF 10 MIN: CPT | Performed by: PHYSICIAN ASSISTANT

## 2023-04-05 PROCEDURE — 86694 HERPES SIMPLEX NES ANTBDY: CPT

## 2023-04-05 ASSESSMENT — FIBROSIS 4 INDEX: FIB4 SCORE: 0.33

## 2023-04-05 NOTE — PROGRESS NOTES
"cc:  sti results    Subjective:     Brandie Hunter is a 22 y.o. female presenting for sti results      Patient presents to the office for sti results.  She denies any symptoms or issues at this time.  Results were obtained in January.  She indicates that she has had a hard time getting in to follow-up with results.  She is also wanting to recheck herpes testing at this time.  She does have a history of cold sores and understands being positive to herpes type I.  However she would like to screen for type II.    Review of systems:  See above.   Denies any symptoms unless previously indicated.        Current Outpatient Medications:     amoxicillin (AMOXIL) 500 MG Cap, Take 1 Capsule by mouth 3 times a day. (Patient not taking: Reported on 4/5/2023), Disp: 30 Capsule, Rfl: 0    fluconazole (DIFLUCAN) 100 MG Tab, Take one tab weekly for 2 weeks. (Patient not taking: Reported on 4/5/2023), Disp: 2 Tablet, Rfl: 0    naproxen (NAPROSYN) 500 MG Tab, Take 1 Tablet by mouth 2 times a day with meals. (Patient not taking: Reported on 1/26/2023), Disp: 6 Tablet, Rfl: 0    omeprazole (PRILOSEC) 20 MG delayed-release capsule, Take 1 Capsule by mouth every day. (Patient not taking: Reported on 1/26/2023), Disp: 90 Capsule, Rfl: 1    Allergies, past medical history, past surgical history, family history, social history reviewed and updated    Objective:     Vitals: /70 (BP Location: Left arm, Patient Position: Sitting, BP Cuff Size: Small adult)   Pulse 69   Resp 16   Ht 1.6 m (5' 3\")   Wt 54.2 kg (119 lb 6.4 oz)   LMP 03/10/2023 (Within Days)   SpO2 98%   BMI 21.15 kg/m²   General: Alert, pleasant, NAD  EYES:   PERRL, EOMI, no icterus or pallor.  Conjunctivae and lids normal.   HENT:  Normocephalic.  External ears normal.  Respiratory: Normal respiratory effort.    Abdomen: Not obese  Skin: Warm, dry, no rashes.  Musculoskeletal: Gait is normal.  Moves all extremities well.    Extremities: normal range of motion " Problem: Patient Care Overview (Adult)  Goal: Plan of Care Review    06/22/17 1610   Coping/Psychosocial Response Interventions   Plan Of Care Reviewed With patient   Outcome Evaluation   Outcome Summary/Follow up Plan pt presents w. generalized weakness post op rev [facility] w. baseline impaired gait uses STC, fair tolerance to tsf chair to bed, educated on therex. pt reports plans SN[facility] at [facility]. may benefit from skilled PT acutely to address functional deficts and shoulder therex.          Problem: Inpatient Physical Therapy  Goal: Bed Mobility Goal LTG- PT    06/22/17 1610   Bed Mobility PT LTG   Bed Mobility PT LTG, Date Established 06/22/17   Bed Mobility PT LTG, Time to Achieve 3 days   Bed Mobility PT LTG, Activity Type all bed mobility   Bed Mobility PT LTG, [place] Level moderate assist (50% patient effort)       Goal: Transfer Training Goal 1 LTG- PT    06/22/17 1610   Transfer Training PT LTG   Transfer Training PT LTG, Date Established 06/22/17   Transfer Training PT LTG, Time to Achieve 3 days   Transfer Training PT LTG, Activity Type all transfers   Transfer Training PT LTG, [place] Level minimum assist (75% patient effort)       Goal: Gait Training Goal LTG- PT    06/22/17 1610   Gait Training PT LTG   Gait Training Goal PT LTG, Date Established 06/22/17   Gait Training Goal PT LTG, Time to Achieve 3 days   Gait Training Goal PT LTG, [place] Level minimum assist (75% patient effort)   Gait Training Goal PT LTG, Assist Device cane, straight   Gait Training Goal PT LTG, Distance to Achieve 75       Goal: Patient Education Goal LTG- PT    06/22/17 1610   Patient Education PT LTG   Patient Education PT LTG, Date Established 06/22/17   Patient Education PT LTG, Time to Achieve 3 days   Patient Education PT LTG, Education Type written program            all extremities.   Neurological: No tremors, sensation grossly intact,  CN2-12 intact.  Psych:  Affect/mood is normal, judgement is good, memory is intact, grooming is appropriate.     Latest Reference Range & Units 01/26/23 16:36 01/27/23 12:56 01/27/23 12:57   Hepatitis B Core Ab, Total Non-Reactive   NonReactive    Hepatitis C Antibody Non-Reactive   Non-Reactive    HIV Ag/Ab Combo Assay Non Reactive   Non-Reactive    Hsv Igm I-Ii Combination <=0.89 IV  0.84    Hsv I-Ii Igg Antibodies IV  >22.40    Syphilis, Treponemal Qual Non-Reactive    Non-Reactive   C. trachomatis by PCR Negative  Negative  Negative   Candida species DNA Probe Negative  Negative Negative    Gardnerella vaginalis DNA Probe Negative  Negative Negative    Gc By Dna Probe Negative    Negative   N. gonorrhoeae by PCR Negative  Negative     Source  Genital  Urine   Trichamonas vaginalis DNA Probe Negative  Negative Negative        Assessment/Plan:     Brandie was seen today for lab results.    Diagnoses and all orders for this visit:    Routine screening for STI (sexually transmitted infection)  -     HSV I/II IGG & IGM SERUM; Future      Further testing has been ordered to evaluate further.  We will notify patient of results when received.      No follow-ups on file.    Please note that this dictation was created using voice recognition software. I have made every reasonable attempt to correct obvious errors, but expect that there are errors of grammar and possible content that I did not discover before finalizing note.

## 2023-04-06 ENCOUNTER — HOSPITAL ENCOUNTER (OUTPATIENT)
Facility: MEDICAL CENTER | Age: 23
End: 2023-04-06
Attending: NURSE PRACTITIONER
Payer: COMMERCIAL

## 2023-04-06 ENCOUNTER — OFFICE VISIT (OUTPATIENT)
Dept: URGENT CARE | Facility: PHYSICIAN GROUP | Age: 23
End: 2023-04-06
Payer: COMMERCIAL

## 2023-04-06 VITALS
HEIGHT: 62 IN | OXYGEN SATURATION: 98 % | BODY MASS INDEX: 21.97 KG/M2 | TEMPERATURE: 98.2 F | DIASTOLIC BLOOD PRESSURE: 80 MMHG | RESPIRATION RATE: 18 BRPM | SYSTOLIC BLOOD PRESSURE: 118 MMHG | WEIGHT: 119.4 LBS | HEART RATE: 83 BPM

## 2023-04-06 DIAGNOSIS — N89.8 VAGINAL DISCHARGE: ICD-10-CM

## 2023-04-06 LAB
APPEARANCE UR: CLEAR
BILIRUB UR STRIP-MCNC: NORMAL MG/DL
COLOR UR AUTO: YELLOW
GLUCOSE UR STRIP.AUTO-MCNC: NORMAL MG/DL
KETONES UR STRIP.AUTO-MCNC: 40 MG/DL
LEUKOCYTE ESTERASE UR QL STRIP.AUTO: NORMAL
NITRITE UR QL STRIP.AUTO: NORMAL
PH UR STRIP.AUTO: 6.5 [PH] (ref 5–8)
PROT UR QL STRIP: NORMAL MG/DL
RBC UR QL AUTO: NORMAL
SP GR UR STRIP.AUTO: 1.02
UROBILINOGEN UR STRIP-MCNC: 0.2 MG/DL

## 2023-04-06 PROCEDURE — 81002 URINALYSIS NONAUTO W/O SCOPE: CPT | Performed by: NURSE PRACTITIONER

## 2023-04-06 PROCEDURE — 87491 CHLMYD TRACH DNA AMP PROBE: CPT

## 2023-04-06 PROCEDURE — 87480 CANDIDA DNA DIR PROBE: CPT

## 2023-04-06 PROCEDURE — 87510 GARDNER VAG DNA DIR PROBE: CPT

## 2023-04-06 PROCEDURE — 99213 OFFICE O/P EST LOW 20 MIN: CPT | Performed by: NURSE PRACTITIONER

## 2023-04-06 PROCEDURE — 87660 TRICHOMONAS VAGIN DIR PROBE: CPT

## 2023-04-06 PROCEDURE — 87591 N.GONORRHOEAE DNA AMP PROB: CPT

## 2023-04-06 ASSESSMENT — ENCOUNTER SYMPTOMS
CARDIOVASCULAR NEGATIVE: 1
EYES NEGATIVE: 1
CONSTITUTIONAL NEGATIVE: 1
FEVER: 0
GASTROINTESTINAL NEGATIVE: 1
CHILLS: 0
RESPIRATORY NEGATIVE: 1

## 2023-04-06 ASSESSMENT — FIBROSIS 4 INDEX: FIB4 SCORE: 0.33

## 2023-04-07 DIAGNOSIS — N89.8 VAGINAL DISCHARGE: ICD-10-CM

## 2023-04-07 NOTE — PROGRESS NOTES
"Subjective:   Brandie Hunter is a 22 y.o. female who presents for Vaginal Discharge (X 4 days) and Vaginal Itching (X 4 days, Pt states it's itchy and feels pressure. Was just seen with her PCP and had a blood drawn for Herpes.  )      Patient presents today for evaluation of vaginal discharge and itching.  She states this has been present for approximately 4 days.  She does not notice any type of an odor or unusual texture.  She states that the discharge is generally a white to clear, and sometimes it is thicker than others.  She did see her primary care provider yesterday and had a blood test drawn for a potential herpes exposure.  She did have negative lab work for herpes a couple of months ago when the exposure first occurred.  Her provider recommended she get additional blood work a few months later just to ensure that she does not have an infection.  Patient states she does not have any genital lesions, but she has had a significant amount of anxiety about it and does feel that occasionally she gets some pain radiating between her groin and her labia.  Otherwise, she denies fever or chills.  She denies any other symptoms today.      Review of Systems   Constitutional: Negative.  Negative for chills and fever.   HENT: Negative.     Eyes: Negative.    Respiratory: Negative.     Cardiovascular: Negative.    Gastrointestinal: Negative.    Genitourinary:         Vaginal discharge   Skin: Negative.      Medications, Allergies, and current problem list reviewed today in Epic.     Objective:     /80   Pulse 83   Temp 36.8 °C (98.2 °F) (Temporal)   Resp 18   Ht 1.575 m (5' 2\")   Wt 54.2 kg (119 lb 6.4 oz)   SpO2 98%     Physical Exam  Vitals reviewed.   Constitutional:       Appearance: Normal appearance.   HENT:      Head: Normocephalic and atraumatic.      Right Ear: Tympanic membrane, ear canal and external ear normal.      Left Ear: Tympanic membrane, ear canal and external ear normal.      " Nose: Nose normal.      Mouth/Throat:      Mouth: Mucous membranes are moist.      Pharynx: Oropharynx is clear.   Eyes:      Extraocular Movements: Extraocular movements intact.      Conjunctiva/sclera: Conjunctivae normal.      Pupils: Pupils are equal, round, and reactive to light.   Cardiovascular:      Rate and Rhythm: Normal rate and regular rhythm.   Pulmonary:      Effort: Pulmonary effort is normal.      Breath sounds: Normal breath sounds.   Abdominal:      General: Abdomen is flat.      Palpations: Abdomen is soft.   Genitourinary:     Comments: Deferred - patient self-swabbed today  Musculoskeletal:         General: Normal range of motion.      Cervical back: Normal range of motion and neck supple.   Skin:     General: Skin is warm and dry.      Capillary Refill: Capillary refill takes less than 2 seconds.   Neurological:      General: No focal deficit present.      Mental Status: She is alert.   Psychiatric:         Mood and Affect: Mood normal.         Behavior: Behavior normal.       Lab Results/POC Test Results   Results for orders placed or performed in visit on 04/06/23   POCT Urinalysis   Result Value Ref Range    POC Color yellow Negative    POC Appearance clear Negative    POC Glucose neg Negative mg/dL    POC Bilirubin neg Negative mg/dL    POC Ketones 40 Negative mg/dL    POC Specific Gravity 1.025 <1.005 - >1.030    POC Blood neg Negative    POC Urine PH 6.5 5.0 - 8.0    POC Protein neg Negative mg/dL    POC Urobiligen 0.2 Negative (0.2) mg/dL    POC Nitrites neg Negative    POC Leukocyte Esterase neg Negative           Assessment/Plan:     Diagnosis and associated orders:     1. Vaginal discharge  VAGINAL PATHOGENS DNA PANEL    POCT Urinalysis    Chlamydia/GC, PCR (Genital/Anal swab)         Comments/MDM:     Patient was given self swabs for GC/chlamydia and vaginal pathogens today.  Her urine was negative for UTI.  Patient will be notified via [facility] of her results, and treatment will be  provided for any positive test results.  She will follow-up with her PCP for any further needs, or urgent care as needed.         Differential diagnosis, natural history, supportive care, and indications for immediate follow-up discussed.    Advised the patient to follow-up with the primary care physician for recheck, reevaluation, and consideration of further management.    Please note that this dictation was created using voice recognition software. I have made a reasonable attempt to correct obvious errors, but I expect that there are errors of grammar and possibly content that I did not discover before finalizing the note.    This note was electronically signed by ALEYDA Marino

## 2023-04-08 LAB
C TRACH DNA GENITAL QL NAA+PROBE: NEGATIVE
CANDIDA DNA VAG QL PROBE+SIG AMP: NEGATIVE
G VAGINALIS DNA VAG QL PROBE+SIG AMP: NEGATIVE
N GONORRHOEA DNA GENITAL QL NAA+PROBE: NEGATIVE
SPECIMEN SOURCE: NORMAL
T VAGINALIS DNA VAG QL PROBE+SIG AMP: NEGATIVE

## 2023-04-10 ENCOUNTER — PATIENT MESSAGE (OUTPATIENT)
Dept: MEDICAL GROUP | Facility: CLINIC | Age: 23
End: 2023-04-10
Payer: COMMERCIAL

## 2023-04-10 DIAGNOSIS — Z11.3 ROUTINE SCREENING FOR STI (SEXUALLY TRANSMITTED INFECTION): ICD-10-CM

## 2023-04-10 LAB
HSV1+2 IGG SER IA-ACNC: >22.4 IV
HSV1+2 IGM SER IA-ACNC: 0.78 IV

## 2023-04-12 ENCOUNTER — HOSPITAL ENCOUNTER (OUTPATIENT)
Dept: LAB | Facility: MEDICAL CENTER | Age: 23
End: 2023-04-12
Attending: PHYSICIAN ASSISTANT
Payer: COMMERCIAL

## 2023-04-12 DIAGNOSIS — Z11.3 ROUTINE SCREENING FOR STI (SEXUALLY TRANSMITTED INFECTION): ICD-10-CM

## 2023-04-12 PROCEDURE — 86694 HERPES SIMPLEX NES ANTBDY: CPT

## 2023-04-12 PROCEDURE — 36415 COLL VENOUS BLD VENIPUNCTURE: CPT

## 2023-04-15 LAB
HSV1+2 IGG SER IA-ACNC: >22.4 IV
HSV1+2 IGM SER IA-ACNC: 0.68 IV

## 2023-06-08 ENCOUNTER — APPOINTMENT (OUTPATIENT)
Dept: MEDICAL GROUP | Facility: CLINIC | Age: 23
End: 2023-06-08
Payer: COMMERCIAL

## 2023-06-15 ENCOUNTER — OFFICE VISIT (OUTPATIENT)
Dept: MEDICAL GROUP | Facility: CLINIC | Age: 23
End: 2023-06-15
Payer: COMMERCIAL

## 2023-06-15 VITALS
WEIGHT: 114 LBS | SYSTOLIC BLOOD PRESSURE: 110 MMHG | RESPIRATION RATE: 16 BRPM | BODY MASS INDEX: 20.98 KG/M2 | TEMPERATURE: 98.4 F | HEART RATE: 85 BPM | OXYGEN SATURATION: 98 % | DIASTOLIC BLOOD PRESSURE: 80 MMHG | HEIGHT: 62 IN

## 2023-06-15 DIAGNOSIS — Z11.3 ROUTINE SCREENING FOR STI (SEXUALLY TRANSMITTED INFECTION): ICD-10-CM

## 2023-06-15 DIAGNOSIS — S20.212S CONTUSION OF RIB ON LEFT SIDE, SEQUELA: ICD-10-CM

## 2023-06-15 DIAGNOSIS — F33.1 MODERATE EPISODE OF RECURRENT MAJOR DEPRESSIVE DISORDER (HCC): ICD-10-CM

## 2023-06-15 PROBLEM — S20.212A CONTUSION OF RIB ON LEFT SIDE: Status: ACTIVE | Noted: 2023-06-15

## 2023-06-15 PROCEDURE — 3079F DIAST BP 80-89 MM HG: CPT | Performed by: PHYSICIAN ASSISTANT

## 2023-06-15 PROCEDURE — 3074F SYST BP LT 130 MM HG: CPT | Performed by: PHYSICIAN ASSISTANT

## 2023-06-15 PROCEDURE — 99213 OFFICE O/P EST LOW 20 MIN: CPT | Performed by: PHYSICIAN ASSISTANT

## 2023-06-15 ASSESSMENT — FIBROSIS 4 INDEX: FIB4 SCORE: 0.33

## 2023-06-15 NOTE — PROGRESS NOTES
"cc: [facility] paperwork    Subjective:     Brandie Hunter is a 22 y.o. female presenting for [employer] paperwork      Patient presents to the office for christie paperwork.  Patient states that she was \"roughhousing\" with her brother.  She states that she had pain on the left side.  She went to the ER due to the pain and the fact that she could not breathe.  She states that sometimes she still has pain but it is better.  However, she took a day from work and is needing paperwork filled out as a result.  She states that it is 80% better.  She feels it mainly when she lays down.  She is currently back at work.         Patient has further questions regarding age SV testing.  She would like to have further testing in the end of July.    Patient has questions as to whether her behavioral health referral is still open.  She would like the phone number so she can make an appointment.    Review of systems:  See above.   Denies any symptoms unless previously indicated.      No current outpatient medications on file.    Allergies, past medical history, past surgical history, family history, social history reviewed and updated    Objective:     Vitals: /80 (BP Location: Left arm, Patient Position: Sitting, BP Cuff Size: Small adult)   Pulse 85   Temp 36.9 °C (98.4 °F) (Temporal)   Resp 16   Ht 1.575 m (5' 2\")   Wt 51.7 kg (114 lb)   LMP 06/05/2023 (Approximate)   SpO2 98%   BMI 20.85 kg/m²   General: Alert, pleasant, NAD  EYES:   PERRL, EOMI, no icterus or pallor.  Conjunctivae and lids normal.   HENT:  Normocephalic.  External ears normal. Neck supple.     Respiratory: Normal respiratory effort.    Abdomen: Not obese.  Skin: Warm, dry, no rashes.  Musculoskeletal: Gait is normal.  Moves all extremities well.    Extremities: Normal range of motion all extremities.   Neurological: No tremors, sensation grossly intact, CN2-12 intact.  Psych:  Affect/mood is normal, judgement is good, memory is intact, " grooming is appropriate.    Assessment/Plan:     Brandie was seen today for paperwork.    Diagnoses and all orders for this visit:    Contusion of rib on left side, sequela    Paperwork has been filled out at this time.  Advised patient that corrections may need to be made.  Follow-up as needed.    Routine screening for STI (sexually transmitted infection)  -     HSV I/II IGG & IGM SERUM; Future    Lab work ordered to evaluate further.    Moderate episode of recurrent major depressive disorder (HCC)      Phone number provided for patient.      No follow-ups on file.    Please note that this dictation was created using voice recognition software. I have made every reasonable attempt to correct obvious errors, but expect that there are errors of grammar and possible content that I did not discover before finalizing note.

## 2023-08-02 ENCOUNTER — HOSPITAL ENCOUNTER (OUTPATIENT)
Dept: LAB | Facility: MEDICAL CENTER | Age: 23
End: 2023-08-02
Attending: PHYSICIAN ASSISTANT
Payer: COMMERCIAL

## 2023-08-02 DIAGNOSIS — Z11.3 ROUTINE SCREENING FOR STI (SEXUALLY TRANSMITTED INFECTION): ICD-10-CM

## 2023-08-02 PROCEDURE — 86694 HERPES SIMPLEX NES ANTBDY: CPT | Mod: 91

## 2023-08-02 PROCEDURE — 36415 COLL VENOUS BLD VENIPUNCTURE: CPT

## 2023-08-05 LAB
HSV1+2 IGG SER IA-ACNC: >22.4 IV
HSV1+2 IGM SER IA-ACNC: 0.68 IV

## 2023-08-29 ENCOUNTER — OFFICE VISIT (OUTPATIENT)
Dept: URGENT CARE | Facility: PHYSICIAN GROUP | Age: 23
End: 2023-08-29
Payer: COMMERCIAL

## 2023-08-29 VITALS
HEIGHT: 62 IN | SYSTOLIC BLOOD PRESSURE: 112 MMHG | TEMPERATURE: 98.1 F | OXYGEN SATURATION: 99 % | BODY MASS INDEX: 22.45 KG/M2 | HEART RATE: 61 BPM | WEIGHT: 122 LBS | RESPIRATION RATE: 16 BRPM | DIASTOLIC BLOOD PRESSURE: 66 MMHG

## 2023-08-29 DIAGNOSIS — K12.1 ORAL ULCER: ICD-10-CM

## 2023-08-29 DIAGNOSIS — J02.9 PHARYNGITIS, UNSPECIFIED ETIOLOGY: ICD-10-CM

## 2023-08-29 LAB — S PYO DNA SPEC NAA+PROBE: NOT DETECTED

## 2023-08-29 PROCEDURE — 3074F SYST BP LT 130 MM HG: CPT

## 2023-08-29 PROCEDURE — 3078F DIAST BP <80 MM HG: CPT

## 2023-08-29 PROCEDURE — 99213 OFFICE O/P EST LOW 20 MIN: CPT

## 2023-08-29 PROCEDURE — 87651 STREP A DNA AMP PROBE: CPT

## 2023-08-29 RX ORDER — LIDOCAINE HYDROCHLORIDE 20 MG/ML
SOLUTION OROPHARYNGEAL
Qty: 100 ML | Refills: 0 | Status: SHIPPED | OUTPATIENT
Start: 2023-08-29 | End: 2023-10-19

## 2023-08-29 RX ORDER — LIDOCAINE HYDROCHLORIDE 20 MG/ML
SOLUTION OROPHARYNGEAL
Qty: 100 ML | Refills: 0 | Status: SHIPPED | OUTPATIENT
Start: 2023-08-29 | End: 2023-08-29

## 2023-08-29 ASSESSMENT — FIBROSIS 4 INDEX: FIB4 SCORE: 0.35

## 2023-08-30 ASSESSMENT — ENCOUNTER SYMPTOMS
COUGH: 0
MYALGIAS: 1
FEVER: 0
SORE THROAT: 1

## 2023-08-30 NOTE — PROGRESS NOTES
"Subjective:   Brandie Hunter is a 23 y.o. female who presents for Pharyngitis (X 1 wk burning sores on tongue ), Congestion, and Body Aches      HPI: This is a 23-year-old female who presents today for sore throat and oral pain.  This is a new problem.  Patient reports developing sore throat 1 week ago.  She reports developing tongue sensitivity and sores today.  She has not experienced symptoms in the past.  She reports throat pain is 8\10.  She has been taking TheraFlu without any improved symptoms.  Patient does report history of strep throat and cold sores.  She reports recently having \"sore on her lip that has resolved.  She denies fevers, chills, body aches.  She does report having multiple sick contacts at work.      Review of Systems   Constitutional:  Positive for malaise/fatigue. Negative for fever.   HENT:  Positive for congestion and sore throat.         + Oral pain   Respiratory:  Negative for cough.    Musculoskeletal:  Positive for myalgias.   All other systems reviewed and are negative.      Medications:    No current outpatient medications on file prior to visit.     No current facility-administered medications on file prior to visit.        Allergies:   Patient has no known allergies.    Problem List:   Patient Active Problem List   Diagnosis    Encounter for surveillance of implantable subdermal contraceptive    Anxiety    Current moderate episode of major depressive disorder (HCC)    Abnormal WBC count    Routine screening for STI (sexually transmitted infection)    Vaginal discharge    Hair loss    Environmental and seasonal allergies    Nasal congestion    Cough    Bloating    Loose stools    Generalized abdominal pain    Gastroesophageal reflux disease without esophagitis    Malaise    Strep throat    Contusion of rib on left side        Surgical History:  No past surgical history on file.    Past Social Hx:   Social History     Tobacco Use    Smoking status: Former     Current " "packs/day: 0.00     Average packs/day: 1 pack/day for 2.0 years (2.0 ttl pk-yrs)     Types: Cigarettes     Start date: 2018     Quit date: 2020     Years since quitting: 3.6    Smokeless tobacco: Never    Tobacco comments:     [facility]   Vaping Use    Vaping Use: Every day    Start date: 1/2/2017    Substances: Nicotine, THC, Flavoring, Nicotine - 50 mg?    Devices: Disposable   Substance Use Topics    Alcohol use: Yes     Alcohol/week: 4.2 oz     Types: 7 Cans of beer per week    Drug use: Yes     Frequency: 5.0 times per week     Types: Marijuana, Inhaled     Comment: vape and smoke          Problem list, medications, and allergies reviewed by myself today in Epic.     Objective:     /66   Pulse 61   Temp 36.7 °C (98.1 °F) (Temporal)   Resp 16   Ht 1.575 m (5' 2\")   Wt 55.3 kg (122 lb)   SpO2 99%   BMI 22.31 kg/m²     Physical Exam  Vitals and nursing note reviewed.   Constitutional:       General: She is not in acute distress.     Appearance: Normal appearance. She is normal weight. She is not ill-appearing, toxic-appearing or diaphoretic.   HENT:      Head: Normocephalic and atraumatic.      Right Ear: Tympanic membrane, ear canal and external ear normal. There is no impacted cerumen.      Left Ear: Tympanic membrane, ear canal and external ear normal. There is no impacted cerumen.      Nose: Nose normal. No congestion or rhinorrhea.      Mouth/Throat:      Mouth: Mucous membranes are moist.      Pharynx: Oropharynx is clear. Uvula midline. Posterior oropharyngeal erythema present. No oropharyngeal exudate.      Tonsils: No tonsillar exudate. 0 on the right. 0 on the left.        Comments: 2 small white oral ulcerations to lateral aspect of tongue.  No drainage.  No papules or raised lesions.   Cardiovascular:      Rate and Rhythm: Normal rate and regular rhythm.      Pulses: Normal pulses.      Heart sounds: Normal heart sounds. No murmur heard.     No friction rub. No gallop.   Pulmonary:      " Effort: Pulmonary effort is normal. No respiratory distress.      Breath sounds: Normal breath sounds. No stridor. No wheezing, rhonchi or rales.   Chest:      Chest wall: No tenderness.   Musculoskeletal:      Cervical back: Neck supple. No tenderness.   Lymphadenopathy:      Cervical: No cervical adenopathy.   Skin:     General: Skin is warm and dry.      Capillary Refill: Capillary refill takes less than 2 seconds.   Neurological:      General: No focal deficit present.      Mental Status: She is alert and oriented to person, place, and time. Mental status is at baseline.      Cranial Nerves: No cranial nerve deficit.      Motor: No weakness.      Gait: Gait normal.   Psychiatric:         Mood and Affect: Mood normal.         Behavior: Behavior normal.         Thought Content: Thought content normal.         Judgment: Judgment normal.         Assessment/Plan:     Diagnosis and associated orders:   1. Pharyngitis, unspecified etiology  POCT GROUP A STREP, PCR      2. Oral ulcer  lidocaine (XYLOCAINE) 2 % Solution    DISCONTINUED: lidocaine (XYLOCAINE) 2 % Solution         Results for orders placed or performed in visit on 08/29/23   POCT GROUP A STREP, PCR   Result Value Ref Range    POC Group A Strep, PCR Not Detected Not Detected, Invalid          Comments/MDM:   Pt is clinically stable at today's acute urgent care visit.  No acute distress noted. Appropriate for outpatient management at this time.     Acute problem.  Patient's strep testing is negative in clinic today.  Differentials include but not limited to viral URI versus stomatitis versus herpes simplex virus.  I discussed these differentials with patient.  At this time, patient will be prescribed viscous lidocaine oral solution for oral ulceration.  I have advised patient to alternate Tylenol and Profen as needed for pain and drink warm fluids.  She is to return for any worsening signs or symptoms or for symptoms of fail to improve, patient is agreeable  this plan of care verbalizes good understanding.           Discussed DDx, management options (risks,benefits, and alternatives to planned treatment), natural progression and supportive care.  Expressed understanding and the treatment plan was agreed upon. Questions were encouraged and answered   Return to urgent care prn if new or worsening sx or if there is no improvement in condition prn.    Educated in Red flags and indications to immediately call 911 or present to the Emergency Department.   Advised the patient to follow-up with the primary care physician for recheck, reevaluation, and consideration of further management.    I personally reviewed prior external notes and test results pertinent to today's visit.  I have independently reviewed and interpreted all diagnostics ordered during this urgent care acute visit.       Please note that this dictation was created using voice recognition software. I have made a reasonable attempt to correct obvious errors, but I expect that there are errors of grammar and possibly content that I did not discover before finalizing the note.    This note was electronically signed by ALEYDA Treadwell

## 2023-10-19 ENCOUNTER — OFFICE VISIT (OUTPATIENT)
Dept: URGENT CARE | Facility: PHYSICIAN GROUP | Age: 23
End: 2023-10-19
Payer: COMMERCIAL

## 2023-10-19 VITALS
SYSTOLIC BLOOD PRESSURE: 100 MMHG | OXYGEN SATURATION: 100 % | RESPIRATION RATE: 18 BRPM | TEMPERATURE: 97.7 F | DIASTOLIC BLOOD PRESSURE: 70 MMHG | WEIGHT: 121.8 LBS | HEIGHT: 62 IN | BODY MASS INDEX: 22.41 KG/M2 | HEART RATE: 62 BPM

## 2023-10-19 DIAGNOSIS — R59.0 ENLARGED LYMPH NODE IN NECK: ICD-10-CM

## 2023-10-19 PROCEDURE — 99214 OFFICE O/P EST MOD 30 MIN: CPT

## 2023-10-19 PROCEDURE — 3078F DIAST BP <80 MM HG: CPT

## 2023-10-19 PROCEDURE — 3074F SYST BP LT 130 MM HG: CPT

## 2023-10-19 ASSESSMENT — FIBROSIS 4 INDEX: FIB4 SCORE: 0.35

## 2023-10-20 NOTE — PROGRESS NOTES
Chief Complaint   Patient presents with    Mass     Rt side of neck with swelling x 1 year. Hard and can palpate it. Pain in the jaw itself        HISTORY OF PRESENT ILLNESS: Patient is a pleasant 23 y.o. female who presents to urgent care today noted swollen area to her neck on the right side near her jaw and ear.  Patient states it causes pain intermittently.  No noted fevers.  No swelling into her throat or tongue.    Patient Active Problem List    Diagnosis Date Noted    Contusion of rib on left side 06/15/2023    Malaise 02/15/2023    Strep throat 02/15/2023    Gastroesophageal reflux disease without esophagitis 08/30/2022    Bloating 07/11/2022    Loose stools 07/11/2022    Generalized abdominal pain 07/11/2022    Cough 12/22/2021    Environmental and seasonal allergies 12/01/2021    Nasal congestion 12/01/2021    Hair loss 12/21/2020    Vaginal discharge 10/28/2020    Routine screening for STI (sexually transmitted infection) 10/06/2020    Abnormal WBC count 06/16/2020    Anxiety 06/04/2020    Current moderate episode of major depressive disorder (HCC) 06/04/2020    Encounter for surveillance of implantable subdermal contraceptive 07/10/2017       Allergies:Patient has no known allergies.    No current [facility] [employer]-ordered outpatient medications on file.     No current [employer]-ordered facility-administered medications on file.       Past Medical History:   Diagnosis Date    Abdominal discomfort 1/12/2022    Anxiety     Bacterial vaginosis 1/12/2022    Depression     Head ache     Heart murmur     as a baby    High risk heterosexual behavior 12/22/2021    Ingrown toenail 10/6/2020    Irregular periods 12/21/2020    Menarche 8/9/2014    Nausea 10/6/2020    Swollen lymph nodes 1/12/2022    Vaginal discharge 10/28/2020    Weight loss 12/21/2020       Social History     Tobacco Use    Smoking status: Former     Current packs/day: 0.00     Average packs/day: 1 pack/day for 2.0 years (2.0 ttl pk-yrs)     Types: Cigarettes      "Start date: 2018     Quit date: 2020     Years since quitting: 3.8    Smokeless tobacco: Never    Tobacco comments:     [facility]   Vaping Use    Vaping Use: Every day    Start date: 1/2/2017    Substances: Nicotine, THC, Flavoring, Nicotine - 50 mg?    Devices: Disposable   Substance Use Topics    Alcohol use: Yes     Alcohol/week: 4.2 oz     Types: 7 Cans of beer per week    Drug use: Yes     Frequency: 5.0 times per week     Types: Marijuana, Inhaled     Comment: vape and smoke       Family Status   Relation Name Status    Mo  Alive        33 in 2014,  depression    Fa unknown Alive        43 in 2014    MGFa  Alive    Bro  Alive    MAunt  Alive    MGMo  Alive        thyroid problems.     Bro  Alive     Family History   Problem Relation Age of Onset    Other Mother         migraine headaches    Migraines Mother     Diabetes Maternal Grandfather     No Known Problems Brother     Psychiatric Illness Maternal Aunt     Cancer Maternal Grandmother     Thyroid Maternal Grandmother     No Known Problems Brother        ROS:  Review of Systems   Constitutional: Negative for fever, chills, weight loss, malaise, and fatigue.   HENT: Negative for ear pain, nosebleeds, congestion, sore throat and neck pain.    Eyes: Negative for vision changes.   Neuro: Negative for headache, sensory changes, weakness, seizure, LOC.   Cardiovascular: Negative for chest pain, palpitations, orthopnea and leg swelling.   Respiratory: Negative for cough, sputum production, shortness of breath and wheezing.   Genitourinary: Negative for dysuria, urgency and frequency.  Musculoskeletal: Negative for falls, neck pain, back pain, joint pain, myalgias.   Skin: Negative for rash, diaphoresis.  Swollen masslike area to the back of the neck near the ear and jawline.    Exam:  /70   Pulse 62   Temp 36.5 °C (97.7 °F) (Temporal)   Resp 18   Ht 1.575 m (5' 2\")   Wt 55.2 kg (121 lb 12.8 oz)   SpO2 100%   General: well-nourished, well-developed female " in NAD  Head: normocephalic, atraumatic  Eyes: PERRLA, no conjunctival injection, acuity grossly intact, lids normal.  Ears: normal shape and symmetry, no tenderness, no discharge. External canals are without any significant edema or erythema. Tympanic membranes are without any inflammation, no effusion. Gross auditory acuity is intact.  Nose: symmetrical without tenderness, no discharge.  Mouth/Throat: reasonable hygiene, no erythema, exudates or tonsillar enlargement.  Neck: no masses, range of motion within normal limits, no tracheal deviation. No obvious thyroid enlargement.   Lymph: Positive cervical adenopathy on the right side. No supraclavicular adenopathy.   Neuro: alert and oriented. No sensory deficit.   Cardiovascular: regular rate and rhythm. No edema.  Pulmonary: no distress. Chest is symmetrical with respiration, no wheezes, crackles, or rhonchi.   Musculoskeletal: no clubbing, appropriate muscle tone, gait is stable.  Skin: warm, dry, intact, no clubbing, no cyanosis, no rashes.   Psych: appropriate mood, affect, judgement.         Assessment/Plan:  1. Enlarged lymph node in neck  US-SOFT TISSUES OF HEAD - NECK      Patient is a pleasant 23 y.o. female who presents to urgent care today noted swollen area to her neck on the right side near her jaw and ear.  Patient states it causes pain intermittently.  No noted fevers.  No swelling into her throat or tongue.  On exam  Positive cervical adenopathy on the right side.  No redness, no major swelling.  Area is not painful to palpation.  I did have a conversation with the patient, this is likely a lymph node, likely more viral in nature, patient is insisting on ultrasound.  She states it is painful and is causing her quite a bit of anxiety.  As the lymph node has been there for 1 year ultrasound ordered.  Advised patient to follow-up if she continues to get worse or does not improve.      Supportive care, differential diagnoses, and indications for  immediate follow-up discussed with patient.   Pathogenesis of diagnosis discussed including typical length and natural progression.   Instructed to return to clinic or nearest emergency department for any change in condition, further concerns, or worsening of symptoms.  Patient states understanding of the plan of care and discharge instructions.  Instructed to make an appointment, for follow up, with primary care provider.      Please note that this dictation was created using voice recognition software. I have made every reasonable attempt to correct obvious errors, but I expect that there are errors of grammar and possibly content that I did not discover before finalizing the note.      Leonie MAYNARD

## 2023-10-30 ENCOUNTER — OFFICE VISIT (OUTPATIENT)
Dept: MEDICAL GROUP | Facility: CLINIC | Age: 23
End: 2023-10-30
Payer: COMMERCIAL

## 2023-10-30 VITALS
TEMPERATURE: 99.9 F | SYSTOLIC BLOOD PRESSURE: 108 MMHG | HEART RATE: 73 BPM | RESPIRATION RATE: 16 BRPM | HEIGHT: 62 IN | WEIGHT: 121.91 LBS | BODY MASS INDEX: 22.43 KG/M2 | OXYGEN SATURATION: 97 % | DIASTOLIC BLOOD PRESSURE: 64 MMHG

## 2023-10-30 DIAGNOSIS — M62.838 MUSCLE SPASM: ICD-10-CM

## 2023-10-30 DIAGNOSIS — R59.9 LYMPH NODE ENLARGEMENT: ICD-10-CM

## 2023-10-30 DIAGNOSIS — G44.209 TENSION HEADACHE: ICD-10-CM

## 2023-10-30 PROCEDURE — 3078F DIAST BP <80 MM HG: CPT | Performed by: PHYSICIAN ASSISTANT

## 2023-10-30 PROCEDURE — 3074F SYST BP LT 130 MM HG: CPT | Performed by: PHYSICIAN ASSISTANT

## 2023-10-30 PROCEDURE — 99213 OFFICE O/P EST LOW 20 MIN: CPT | Performed by: PHYSICIAN ASSISTANT

## 2023-10-30 RX ORDER — BACLOFEN 10 MG/1
10 TABLET ORAL 2 TIMES DAILY PRN
Qty: 60 TABLET | Refills: 0 | Status: SHIPPED | OUTPATIENT
Start: 2023-10-30

## 2023-10-30 ASSESSMENT — FIBROSIS 4 INDEX: FIB4 SCORE: 0.35

## 2023-10-30 NOTE — PROGRESS NOTES
"cc:  neck swelling    Subjective:     Brandie Hunter is a 23 y.o. female presenting for neck swelling      Patient presents to the office for neck swelling.   Patient was seen in the urgent care on October 19th and was diagnosed with adenopathy of the neck.  She reports having a lymph node in her neck for about a year.  Ultrasound was ordered for her neck.  Patient states that she has been waiting for a call regarding the ultrasound.  She state that she has been having a sharp pain in the right side of her jaw.  She states that she has not been grinding her teeth.  She states that the swelling in her neck has improved. She states that she will feel the pain in the back of her head but sometimes will be in the front of her head.  Sometimes she feels a lump in her throat.   She states in the past she has heard dripping when she lays down.  She states that she has not had heart burn or acid reflux for \"a while\".  She does acknowledge that she has been under increased stress recently.    Review of systems:  See above.   Denies any symptoms unless previously indicated.        Current Outpatient Medications:     baclofen (LIORESAL) 10 MG Tab, Take 1 Tablet by mouth 2 times a day as needed (pain)., Disp: 60 Tablet, Rfl: 0    Allergies, past medical history, past surgical history, family history, social history reviewed and updated    Objective:     Vitals: /64 (BP Location: Left arm, Patient Position: Sitting, BP Cuff Size: Adult)   Pulse 73   Temp 37.7 °C (99.9 °F) (Temporal)   Resp 16   Ht 1.575 m (5' 2\")   Wt 55.3 kg (121 lb 14.6 oz)   [facility] 10/20/2023 Comment: 10/8/23 and 10/20/23  SpO2 97%   BMI 22.30 kg/m²   General: Alert, pleasant, NAD  EYES:   PERRL, EOMI, no icterus or pallor.  Conjunctivae and lids normal.   HENT:  Normocephalic.  External ears normal.   Neck supple.   Right post auricular lymph node present.  Muscle tension posterior neck.    Respiratory: Normal respiratory effort.  "   Abdomen: Not obese    Skin: Warm, dry, no rashes.  Musculoskeletal: Gait is normal.  Moves all extremities well.    Extremities: normal range of motion all extremities.   Neurological: No tremors, sensation grossly intact, CN2-12 intact.  Psych:  Affect/mood is normal, judgement is good, memory is intact, grooming is appropriate.    Assessment/Plan:     Brandie was seen today for neck swelling and bump.    Diagnoses and all orders for this visit:    Lymph node enlargement    Muscle spasm  -     baclofen (LIORESAL) 10 MG Tab; Take 1 Tablet by mouth 2 times a day as needed (pain).    Tension headache      Ultrasound was ordered by urgent care.  I did provide patient with phone number so she can contact radiology to schedule this appointment.  In the meantime I do believe she is having some muscle spasming contributing to tension headaches.  We will start her on baclofen 10 mg twice a day as needed for symptoms.  Patient advised to try medication when she is able to get some sleep.  Follow-up in 4 to 6 weeks with results and with medication.      Return in about 6 weeks (around 12/11/2023), or if symptoms worsen or fail to improve, for 4-6 weeks.  ultrasound results and medication. .    Please note that this dictation was created using voice recognition software. I have made every reasonable attempt to correct obvious errors, but expect that there are errors of grammar and possible content that I did not discover before finalizing note.

## 2023-11-02 ENCOUNTER — HOSPITAL ENCOUNTER (OUTPATIENT)
Dept: RADIOLOGY | Facility: MEDICAL CENTER | Age: 23
End: 2023-11-02
Payer: COMMERCIAL

## 2023-11-02 DIAGNOSIS — R59.0 ENLARGED LYMPH NODE IN NECK: ICD-10-CM

## 2023-11-02 PROCEDURE — 76536 US EXAM OF HEAD AND NECK: CPT

## 2023-11-04 ENCOUNTER — PATIENT MESSAGE (OUTPATIENT)
Dept: MEDICAL GROUP | Facility: CLINIC | Age: 23
End: 2023-11-04
Payer: COMMERCIAL

## 2023-11-04 DIAGNOSIS — R59.9 LYMPH NODE ENLARGEMENT: ICD-10-CM

## 2023-12-05 ENCOUNTER — HOSPITAL ENCOUNTER (OUTPATIENT)
Dept: RADIOLOGY | Facility: MEDICAL CENTER | Age: 23
End: 2023-12-05
Attending: PHYSICIAN ASSISTANT
Payer: COMMERCIAL

## 2023-12-05 DIAGNOSIS — R59.9 LYMPH NODE ENLARGEMENT: ICD-10-CM

## 2023-12-05 PROCEDURE — 76536 US EXAM OF HEAD AND NECK: CPT

## 2023-12-14 ENCOUNTER — OFFICE VISIT (OUTPATIENT)
Dept: MEDICAL GROUP | Facility: CLINIC | Age: 23
End: 2023-12-14
Payer: COMMERCIAL

## 2023-12-14 VITALS
TEMPERATURE: 97.2 F | HEIGHT: 63 IN | DIASTOLIC BLOOD PRESSURE: 70 MMHG | SYSTOLIC BLOOD PRESSURE: 102 MMHG | HEART RATE: 70 BPM | OXYGEN SATURATION: 98 % | RESPIRATION RATE: 16 BRPM | WEIGHT: 121.47 LBS | BODY MASS INDEX: 21.52 KG/M2

## 2023-12-14 DIAGNOSIS — Z11.3 ROUTINE SCREENING FOR STI (SEXUALLY TRANSMITTED INFECTION): ICD-10-CM

## 2023-12-14 DIAGNOSIS — R10.13 DYSPEPSIA: ICD-10-CM

## 2023-12-14 DIAGNOSIS — R59.9 LYMPH NODE ENLARGEMENT: ICD-10-CM

## 2023-12-14 PROCEDURE — 3078F DIAST BP <80 MM HG: CPT | Performed by: PHYSICIAN ASSISTANT

## 2023-12-14 PROCEDURE — 99214 OFFICE O/P EST MOD 30 MIN: CPT | Performed by: PHYSICIAN ASSISTANT

## 2023-12-14 PROCEDURE — 3074F SYST BP LT 130 MM HG: CPT | Performed by: PHYSICIAN ASSISTANT

## 2023-12-14 RX ORDER — OMEPRAZOLE 20 MG/1
20 CAPSULE, DELAYED RELEASE ORAL DAILY
Qty: 30 CAPSULE | Refills: 2 | Status: SHIPPED | OUTPATIENT
Start: 2023-12-14

## 2023-12-14 ASSESSMENT — FIBROSIS 4 INDEX: FIB4 SCORE: 0.35

## 2023-12-14 NOTE — PROGRESS NOTES
"cc:  lymph node    Subjective:     Brandie Hunter is a 23 y.o. female presenting for lymph node      Patient presents to the office for lymph node.  Patient has had 3 ultrasounds since January 2022 to evaluate the lymph node.  There has not been a significant amount of change.  Measurements from 1-22 are 2.5 x 0.7.  Measurements from 11-23 are 1.9 x 0.7 x 1.8.  Measurements from 12-23 are 2.8 x 0.6 x 2.0.    Patient states that she will wake up feeling nauseated.  Sometimes she will vomit in the morning but could also be anxiety.  She states that she has felt uncomfortable with this in the past.  She would like to try something to see if this will help her symptoms.    Patient is requesting further testing for STD screening.    Review of systems:  See above.   Denies any symptoms unless previously indicated.        Current Outpatient Medications:     omeprazole (PRILOSEC) 20 MG delayed-release capsule, Take 1 Capsule by mouth every day., Disp: 30 Capsule, Rfl: 2    baclofen (LIORESAL) 10 MG Tab, Take 1 Tablet by mouth 2 times a day as needed (pain). (Patient not taking: Reported on 12/14/2023), Disp: 60 Tablet, Rfl: 0    Allergies, past medical history, past surgical history, family history, social history reviewed and updated    Objective:     Vitals: /70 (BP Location: Left arm, Patient Position: Sitting, BP Cuff Size: Small adult)   Pulse 70   Temp 36.2 °C (97.2 °F) (Temporal)   Resp 16   Ht 1.6 m (5' 3\")   Wt 55.1 kg (121 lb 7.6 oz)   LMP 12/14/2023 (Exact Date)   SpO2 98%   BMI 21.52 kg/m²   General: Alert, pleasant, NAD  EYES:   PERRL, EOMI, no icterus or pallor.  Conjunctivae and lids normal.   HENT:  Normocephalic.  External ears normal.   Neck supple.     Respiratory: Normal respiratory effort.   Abdomen: Not obese  Skin: Warm, dry, no rashes.  Musculoskeletal: Gait is normal.  Moves all extremities well.    Extremities: normal range of motion all extremities.   Neurological: No " tremors, sensation grossly intact, CN2-12 intact.  Psych:  Affect/mood is normal, judgement is good, memory is intact, grooming is appropriate.  US-SOFT TISSUES OF HEAD - NECK  Order: [id number]  Status: Final result       Visible to patient: Yes (seen)       Next appt: None       Dx: Lymph node enlargement    1 Result Note       1 Patient Communication  Details    Reading Physician Reading Date Result Priority   Alin Mcknight M.D.  [phone] 12/5/2023      Narrative & Impression     12/5/2023 10:49 AM     HISTORY/REASON FOR EXAM:  Mass/Lump; follow up 2 weeks lymphadenopathy     TECHNIQUE/EXAM DESCRIPTION:  Ultrasound of the soft tissues of the head and neck.     COMPARISON:  11/2/2023     FINDINGS:  Mildly prominent lymph node in the RIGHT submandibular region measuring 2.8 x 0.6 x 2.0 cm with effacement of the fatty hilum, minimally increased in size from prior.  Normal-appearing lymph node adjacent RIGHT parotid.     IMPRESSION:     Mildly prominent RIGHT submandibular lymph node, minimally increased in size from prior, likely reactive.         Assessment/Plan:     Brandie was seen today for results.    Diagnoses and all orders for this visit:    Lymph node enlargement  -     POC FNA BIOPSY US GUIDED THYROID/PARATHYROID/LYMPH NODE; Future    Patient has been concerned about this since January 2022.  At this time she would like to proceed with biopsy for further evaluation.  Follow-up in 2 months with results.    Dyspepsia  -     omeprazole (PRILOSEC) 20 MG delayed-release capsule; Take 1 Capsule by mouth every day.    Possible acid reflux.  Will try patient on omeprazole to see if this will help her symptoms.    Routine screening for STI (sexually transmitted infection)  -     HSV by PCR WRFLEX to HSV 1/2 Subtype; Future  -     Chlamydia/GC, PCR (Urine); Future  -     HIV AG/AB COMBO ASSAY DIAGNOSTIC; Future  -     TRICHOMONAS CULTURE  -     RPR (SYPHILIS); Future    Lab orders have been placed.  Follow-up  with results when received.        Return in about 8 weeks (around 2/8/2024), or if symptoms worsen or fail to improve.    Please note that this dictation was created using voice recognition software. I have made every reasonable attempt to correct obvious errors, but expect that there are errors of grammar and possible content that I did not discover before finalizing note.

## 2023-12-23 ENCOUNTER — HOSPITAL ENCOUNTER (OUTPATIENT)
Dept: LAB | Facility: MEDICAL CENTER | Age: 23
End: 2023-12-23
Attending: PHYSICIAN ASSISTANT
Payer: COMMERCIAL

## 2023-12-23 DIAGNOSIS — Z11.3 ROUTINE SCREENING FOR STI (SEXUALLY TRANSMITTED INFECTION): ICD-10-CM

## 2023-12-23 LAB
HIV 1+2 AB+HIV1 P24 AG SERPL QL IA: NORMAL
T PALLIDUM AB SER QL IA: NORMAL

## 2023-12-23 PROCEDURE — G0475 HIV COMBINATION ASSAY: HCPCS

## 2023-12-23 PROCEDURE — 87591 N.GONORRHOEAE DNA AMP PROB: CPT

## 2023-12-23 PROCEDURE — 87491 CHLMYD TRACH DNA AMP PROBE: CPT

## 2023-12-23 PROCEDURE — 86780 TREPONEMA PALLIDUM: CPT

## 2023-12-23 PROCEDURE — 36415 COLL VENOUS BLD VENIPUNCTURE: CPT

## 2023-12-23 PROCEDURE — 87529 HSV DNA AMP PROBE: CPT | Mod: 91

## 2023-12-26 LAB
HSV1 DNA CSF QL NAA+PROBE: NOT DETECTED
HSV2 DNA CSF QL NAA+PROBE: NOT DETECTED
SPECIMEN SOURCE: NORMAL

## 2024-01-05 ENCOUNTER — HOSPITAL ENCOUNTER (OUTPATIENT)
Dept: RADIOLOGY | Facility: MEDICAL CENTER | Age: 24
End: 2024-01-05
Attending: PHYSICIAN ASSISTANT
Payer: COMMERCIAL

## 2024-01-05 DIAGNOSIS — R59.9 LYMPH NODE ENLARGEMENT: ICD-10-CM

## 2024-01-05 LAB — PATHOLOGY CONSULT NOTE: NORMAL

## 2024-01-05 PROCEDURE — 88305 TISSUE EXAM BY PATHOLOGIST: CPT

## 2024-01-05 PROCEDURE — 10005 FNA BX W/US GDN 1ST LES: CPT

## 2024-01-05 PROCEDURE — 88173 CYTOPATH EVAL FNA REPORT: CPT

## 2024-01-05 NOTE — PROGRESS NOTES
US guided right submandibular lymph node  fine needle aspiration done by Dr. Graves; NON-SEDATION (no H&P required as this is a NON SEDATION procedure) Right anterior aspect of neck access site, dressing CDI; x4 jar of cytolyt obtained, x1 vial RPMI obtained and sent to lab. Pt tolerated the procedure well. Pt hemodynamically stable pre/intra/post procedure; all questions and concerns answered prior to being d/c; patient provided with appropriate education for procedure; pt d/c home.

## 2024-01-14 ENCOUNTER — OFFICE VISIT (OUTPATIENT)
Dept: URGENT CARE | Facility: PHYSICIAN GROUP | Age: 24
End: 2024-01-14
Payer: COMMERCIAL

## 2024-01-14 VITALS
OXYGEN SATURATION: 98 % | DIASTOLIC BLOOD PRESSURE: 68 MMHG | SYSTOLIC BLOOD PRESSURE: 102 MMHG | RESPIRATION RATE: 14 BRPM | BODY MASS INDEX: 22.5 KG/M2 | HEART RATE: 77 BPM | HEIGHT: 63 IN | WEIGHT: 127 LBS | TEMPERATURE: 97.4 F

## 2024-01-14 DIAGNOSIS — G43.809 OTHER MIGRAINE WITHOUT STATUS MIGRAINOSUS, NOT INTRACTABLE: ICD-10-CM

## 2024-01-14 PROCEDURE — 99213 OFFICE O/P EST LOW 20 MIN: CPT | Performed by: FAMILY MEDICINE

## 2024-01-14 PROCEDURE — 3078F DIAST BP <80 MM HG: CPT | Performed by: FAMILY MEDICINE

## 2024-01-14 PROCEDURE — 3074F SYST BP LT 130 MM HG: CPT | Performed by: FAMILY MEDICINE

## 2024-01-14 ASSESSMENT — FIBROSIS 4 INDEX: FIB4 SCORE: 0.35

## 2024-01-15 ENCOUNTER — APPOINTMENT (OUTPATIENT)
Dept: RADIOLOGY | Facility: MEDICAL CENTER | Age: 24
End: 2024-01-15
Attending: FAMILY MEDICINE
Payer: COMMERCIAL

## 2024-01-15 NOTE — PROGRESS NOTES
Chief Complaint   Patient presents with    Headache     X 2 months with nausea, insomnia.                Ongoing headaches.       Headaches x 5-6 month      States headaches are getting worse.       + nausea and light sensitivity.       Sometimes improved with motrin            Pertinent negatives include no abdominal pain or fever. The symptoms are aggravated by activity and bright light.        Social History     Tobacco Use    Smoking status: Former     Current packs/day: 0.00     Average packs/day: 1 pack/day for 2.0 years (2.0 ttl pk-yrs)     Types: Cigarettes     Start date:      Quit date:      Years since quittin.0    Smokeless tobacco: Never    Tobacco comments:     [facility]   Vaping Use    Vaping Use: Every day    Start date: 2017    Substances: Nicotine, THC, Flavoring, Nicotine - 50 mg?    Devices: Disposable   Substance Use Topics    Alcohol use: Yes     Alcohol/week: 0.6 oz     Types: 1 Cans of beer per week    Drug use: Yes     Frequency: 5.0 times per week     Types: Marijuana, Inhaled     Comment: vape and smoke           Past Medical History:   Diagnosis Date    Abdominal discomfort 2022    Anxiety     Bacterial vaginosis 2022    Depression     Head ache     Heart murmur     as a baby    High risk heterosexual behavior 2021    Ingrown toenail 10/6/2020    Irregular periods 2020    Menarche 2014    Nausea 10/6/2020    Swollen lymph nodes 2022    Vaginal discharge 10/28/2020    Weight loss 2020           Review of Systems   Constitutional: Negative for fever and chills.   Eyes: Positive for photophobia.   Respiratory: Negative for shortness of breath.    Cardiovascular: Negative for chest pain and palpitations.   Gastrointestinal: Positive for nausea. Negative for abdominal pain.   Skin: Negative for rash.   Neurological: Positive for dizziness and headaches.   Psychiatric/Behavioral: The patient is not nervous/anxious.    All other systems reviewed  "and are negative.         Objective:     /68   Pulse 77   Temp 36.3 °C (97.4 °F) (Temporal)   Resp 14   Ht 1.6 m (5' 3\")   Wt 57.6 kg (127 lb)   SpO2 98%     Physical Exam   Constitutional: pt is oriented to person, place, and time.  Pt appears well-developed and well-nourished. No distress.   HENT:   Head: Normocephalic and atraumatic.   Mouth/Throat: Oropharynx is clear and moist. No oropharyngeal exudate.   Eyes: Conjunctivae and EOM are normal. Pupils are equal, round, and reactive to light. Right eye exhibits no discharge. Left eye exhibits no discharge. No scleral icterus.   Neck: Neck supple.   Cardiovascular: Normal rate and regular rhythm.    Pulmonary/Chest: Effort normal.   Lymphadenopathy:     Pt has no cervical adenopathy.   Neurologic: Alert and oriented. Cranial nerves II-XII intact, EOMs intact, no tongue deviation, PERRL, no facial asymmetry to motor or sensation, symmetric palate, normal finger-to-nose test, no pronator drift. No focal motor deficits. Symmetric reflexes. Normal station and gait, normal tandem walk. Coordination normal.   Skin: Skin is warm. Pt is not diaphoretic. No erythema. No pallor.   Psychiatric:  behavior is normal.   Nursing note and vitals reviewed.       CT-HEAD W/O  Order: [id number]  Status: Final result       Visible to patient: Yes (seen)       Next appt: None       Dx: Other migraine without status migrain...    0 Result Notes       1 Patient Communication  Details    Reading Physician Reading Date Result Priority   Alin Mcknight M.D.  [phone] 1/16/2024      Narrative & Impression     1/16/2024 5:25 PM     HISTORY/REASON FOR EXAM:  headache; Pain.  Chronic migraines, worsening over the past 5 months.  Nausea.     TECHNIQUE/EXAM DESCRIPTION AND NUMBER OF VIEWS:  CT of the head without contrast.     The study was performed on a helical multidetector CT scanner. Contiguous 2.5 mm axial sections were obtained from the skull base through the vertex.     Up " to date radiation dose reduction adjustments have been utilized to meet ALARA standards for radiation dose reduction.     COMPARISON:  None available     FINDINGS:  Lateral ventricles are normal in size and symmetric.  Cortical sulci are within normal limits.  No significant mass effect or midline shift.  Basal cisterns are patent.  No evidence for intracranial hemorrhage.  Calvaria are intact.  Visualized orbits are unremarkable.  Visualized mastoid air cells are clear.  Visualized paranasal sinuses are unremarkable.  LEFT forehead piercing.     IMPRESSION:     No acute intracranial abnormality.              Exam Ended: 01/16/24  5:32 PM Last Resulted: 01/16/24  5:41 PM                Assessment/Plan:      1. Other migraine without status migrainosus, not intractable     - CT-HEAD personally reviewed.    It was completely unremarkable.     - Referral to Neurology      Differential diagnosis, natural history, supportive care, and indications for immediate follow-up discussed. All questions answered. Patient agrees with the plan of care.     Follow-up as needed if symptoms worsen or fail to improve to PCP, Urgent care or Emergency Room.     I have personally reviewed prior external notes and test results pertinent to today's visit.  I have independently reviewed and interpreted all diagnostics ordered during this urgent care acute visit.

## 2024-01-16 ENCOUNTER — HOSPITAL ENCOUNTER (OUTPATIENT)
Dept: RADIOLOGY | Facility: MEDICAL CENTER | Age: 24
End: 2024-01-16
Attending: FAMILY MEDICINE
Payer: COMMERCIAL

## 2024-01-16 DIAGNOSIS — G43.809 OTHER MIGRAINE WITHOUT STATUS MIGRAINOSUS, NOT INTRACTABLE: ICD-10-CM

## 2024-01-16 PROCEDURE — 70450 CT HEAD/BRAIN W/O DYE: CPT

## 2024-01-31 ENCOUNTER — APPOINTMENT (OUTPATIENT)
Dept: MEDICAL GROUP | Facility: CLINIC | Age: 24
End: 2024-01-31
Payer: COMMERCIAL

## 2024-05-14 ENCOUNTER — HOSPITAL ENCOUNTER (OUTPATIENT)
Dept: LAB | Facility: MEDICAL CENTER | Age: 24
End: 2024-05-14
Attending: FAMILY MEDICINE
Payer: COMMERCIAL

## 2024-05-14 ENCOUNTER — OFFICE VISIT (OUTPATIENT)
Dept: URGENT CARE | Facility: PHYSICIAN GROUP | Age: 24
End: 2024-05-14
Payer: COMMERCIAL

## 2024-05-14 VITALS
BODY MASS INDEX: 22.32 KG/M2 | HEIGHT: 63 IN | OXYGEN SATURATION: 99 % | WEIGHT: 126 LBS | RESPIRATION RATE: 16 BRPM | DIASTOLIC BLOOD PRESSURE: 64 MMHG | TEMPERATURE: 97.7 F | HEART RATE: 60 BPM | SYSTOLIC BLOOD PRESSURE: 100 MMHG

## 2024-05-14 DIAGNOSIS — Z11.3 SCREENING EXAMINATION FOR STD (SEXUALLY TRANSMITTED DISEASE): ICD-10-CM

## 2024-05-14 DIAGNOSIS — J02.9 SORE THROAT: ICD-10-CM

## 2024-05-14 DIAGNOSIS — Z72.0 TOBACCO USER: ICD-10-CM

## 2024-05-14 LAB
APPEARANCE UR: CLEAR
BILIRUB UR STRIP-MCNC: NEGATIVE MG/DL
CANDIDA DNA VAG QL PROBE+SIG AMP: NEGATIVE
COLOR UR AUTO: NORMAL
G VAGINALIS DNA VAG QL PROBE+SIG AMP: NEGATIVE
GLUCOSE UR STRIP.AUTO-MCNC: NEGATIVE MG/DL
HBV CORE AB SERPL QL IA: NONREACTIVE
HBV SURFACE AB SERPL IA-ACNC: <3.5 MIU/ML (ref 0–10)
HBV SURFACE AG SER QL: NORMAL
HCV AB SER QL: NORMAL
HIV 1+2 AB+HIV1 P24 AG SERPL QL IA: NORMAL
KETONES UR STRIP.AUTO-MCNC: NEGATIVE MG/DL
LEUKOCYTE ESTERASE UR QL STRIP.AUTO: NEGATIVE
NITRITE UR QL STRIP.AUTO: NEGATIVE
PH UR STRIP.AUTO: 7 [PH] (ref 5–8)
POCT INT CON NEG: NEGATIVE
POCT INT CON POS: POSITIVE
POCT URINE PREGNANCY TEST: NEGATIVE
PROT UR QL STRIP: NEGATIVE MG/DL
RBC UR QL AUTO: NEGATIVE
S PYO DNA SPEC NAA+PROBE: NOT DETECTED
SP GR UR STRIP.AUTO: 1.01
T PALLIDUM AB SER QL IA: NORMAL
T VAGINALIS DNA VAG QL PROBE+SIG AMP: NEGATIVE
UROBILINOGEN UR STRIP-MCNC: 0.2 MG/DL

## 2024-05-14 PROCEDURE — 81025 URINE PREGNANCY TEST: CPT | Performed by: FAMILY MEDICINE

## 2024-05-14 PROCEDURE — 99406 BEHAV CHNG SMOKING 3-10 MIN: CPT | Performed by: FAMILY MEDICINE

## 2024-05-14 PROCEDURE — 3074F SYST BP LT 130 MM HG: CPT | Performed by: FAMILY MEDICINE

## 2024-05-14 PROCEDURE — 3078F DIAST BP <80 MM HG: CPT | Performed by: FAMILY MEDICINE

## 2024-05-14 PROCEDURE — 99213 OFFICE O/P EST LOW 20 MIN: CPT | Mod: 25 | Performed by: FAMILY MEDICINE

## 2024-05-14 PROCEDURE — 87651 STREP A DNA AMP PROBE: CPT | Performed by: FAMILY MEDICINE

## 2024-05-14 PROCEDURE — 81002 URINALYSIS NONAUTO W/O SCOPE: CPT | Performed by: FAMILY MEDICINE

## 2024-05-14 ASSESSMENT — FIBROSIS 4 INDEX: FIB4 SCORE: 0.35

## 2024-05-14 NOTE — PROGRESS NOTES
"  Subjective:      23 y.o. female presents to urgent care for sore throat that started on Sunday. No other cold symptoms such as fever or diarrhea. She vapes tobacco products daily. No history of asthma or COPD. She is vaccinated against COVID. Her sister currently has strep throat.     She is also here for STD testing. Currently denies any vaginal rashes, lesions, discharge, or odors. She is currently sexually active with one, male partner, and they use condoms for contraception.      She denies any other questions or concerns at this time.    Current problem list, medication, and past medical/surgical history were reviewed in Epic.    ROS  See HPI     Objective:      /64   Pulse 60   Temp 36.5 °C (97.7 °F) (Temporal)   Resp 16   Ht 1.6 m (5' 3\")   Wt 57.2 kg (126 lb)   LMP 04/22/2024   SpO2 99%   BMI 22.32 kg/m²     Physical Exam  Constitutional:       General: She is not in acute distress.     Appearance: She is not diaphoretic.   HENT:      Right Ear: Tympanic membrane, ear canal and external ear normal.      Left Ear: Tympanic membrane, ear canal and external ear normal.      Mouth/Throat:      Tongue: Tongue does not deviate from midline.      Palate: No lesions.      Pharynx: Uvula midline. No oropharyngeal exudate or posterior oropharyngeal erythema.      Tonsils: No tonsillar exudate. 1+ on the right. 1+ on the left.   Cardiovascular:      Rate and Rhythm: Normal rate and regular rhythm.      Heart sounds: Normal heart sounds.   Pulmonary:      Effort: Pulmonary effort is normal. No respiratory distress.      Breath sounds: Normal breath sounds.   Neurological:      Mental Status: She is alert.   Psychiatric:         Mood and Affect: Affect normal.         Judgment: Judgment normal.     Assessment/Plan:     1. Sore throat  Rapid strep negative. Symptoms most consistent with virus. Tylenol, ibuprofen, and gargle warm salt water as needed  - POCT CEPHEID GROUP A STREP - PCR    2. Tobacco user  4 " minutes was spent in educating patient of health risks associated with tobacco, nicotine, and vaping. Verbalized understanding. She has been successful in quitting in the past.  The longest she has quit for previously is 1 week.  She is interested in quitting again, I have given her the resource 1 800 quit now.    3. Screening examination for STD (sexually transmitted disease)  STD screening has been ordered. Will treat appropriately once resulted.   - Chlamydia/GC, PCR (Genital/Anal swab); Future  - HIV AG/AB Combo Assay Screening; Future  - T.Pallidum AB TANIA (Screening); Future  - Trichomonas Vaginalis by TMA  - Hepatitis C Virus Antibody; Future  - HEP B Surface Antibody; Future  - Hep B Core AB Total; Future  - Hep B Surface Antigen; Future  - VAGINAL PATHOGENS DNA PANEL; Future  - POCT Urinalysis  - POCT PREGNANCY          Instructed to return to Urgent Care or nearest Emergency Department if symptoms fail to improve, for any change in condition, further concerns, or new concerning symptoms. Patient states understanding of the plan of care and discharge instructions.    Flaca Larios M.D.

## 2024-05-15 LAB
C TRACH DNA GENITAL QL NAA+PROBE: NEGATIVE
N GONORRHOEA DNA GENITAL QL NAA+PROBE: NEGATIVE
SPECIMEN SOURCE: NORMAL

## 2024-07-05 ENCOUNTER — OFFICE VISIT (OUTPATIENT)
Dept: URGENT CARE | Facility: PHYSICIAN GROUP | Age: 24
End: 2024-07-05
Payer: COMMERCIAL

## 2024-07-05 VITALS
HEART RATE: 66 BPM | HEIGHT: 64 IN | SYSTOLIC BLOOD PRESSURE: 116 MMHG | TEMPERATURE: 98.6 F | DIASTOLIC BLOOD PRESSURE: 68 MMHG | WEIGHT: 125.4 LBS | OXYGEN SATURATION: 98 % | RESPIRATION RATE: 20 BRPM | BODY MASS INDEX: 21.41 KG/M2

## 2024-07-05 DIAGNOSIS — R09.A2 GLOBUS SENSATION: ICD-10-CM

## 2024-07-05 PROCEDURE — 3074F SYST BP LT 130 MM HG: CPT

## 2024-07-05 PROCEDURE — 99214 OFFICE O/P EST MOD 30 MIN: CPT

## 2024-07-05 PROCEDURE — 3078F DIAST BP <80 MM HG: CPT

## 2024-07-05 ASSESSMENT — FIBROSIS 4 INDEX: FIB4 SCORE: 0.35

## 2024-07-05 ASSESSMENT — ENCOUNTER SYMPTOMS
SORE THROAT: 0
WEIGHT LOSS: 0

## 2024-07-26 ENCOUNTER — OFFICE VISIT (OUTPATIENT)
Dept: URGENT CARE | Facility: PHYSICIAN GROUP | Age: 24
End: 2024-07-26
Payer: COMMERCIAL

## 2024-07-26 VITALS
SYSTOLIC BLOOD PRESSURE: 110 MMHG | OXYGEN SATURATION: 98 % | TEMPERATURE: 97.5 F | BODY MASS INDEX: 22.08 KG/M2 | HEART RATE: 68 BPM | DIASTOLIC BLOOD PRESSURE: 70 MMHG | HEIGHT: 63 IN | WEIGHT: 124.6 LBS | RESPIRATION RATE: 18 BRPM

## 2024-07-26 DIAGNOSIS — T24.231A PARTIAL THICKNESS BURN OF RIGHT LOWER LEG, INITIAL ENCOUNTER: ICD-10-CM

## 2024-07-26 PROCEDURE — 99213 OFFICE O/P EST LOW 20 MIN: CPT | Performed by: NURSE PRACTITIONER

## 2024-07-26 PROCEDURE — 3078F DIAST BP <80 MM HG: CPT | Performed by: NURSE PRACTITIONER

## 2024-07-26 PROCEDURE — 3074F SYST BP LT 130 MM HG: CPT | Performed by: NURSE PRACTITIONER

## 2024-07-26 RX ORDER — CEPHALEXIN 500 MG/1
500 CAPSULE ORAL 2 TIMES DAILY
Qty: 14 CAPSULE | Refills: 0 | Status: SHIPPED | OUTPATIENT
Start: 2024-07-26 | End: 2024-08-02

## 2024-07-26 ASSESSMENT — ENCOUNTER SYMPTOMS: BURN: 1

## 2024-07-26 ASSESSMENT — FIBROSIS 4 INDEX: FIB4 SCORE: 0.35

## 2024-08-07 ENCOUNTER — APPOINTMENT (OUTPATIENT)
Dept: MEDICAL GROUP | Facility: CLINIC | Age: 24
End: 2024-08-07
Payer: COMMERCIAL

## 2024-08-07 ENCOUNTER — HOSPITAL ENCOUNTER (OUTPATIENT)
Facility: MEDICAL CENTER | Age: 24
End: 2024-08-07
Attending: PHYSICIAN ASSISTANT

## 2024-08-07 VITALS
TEMPERATURE: 97.9 F | RESPIRATION RATE: 14 BRPM | HEART RATE: 75 BPM | BODY MASS INDEX: 22.7 KG/M2 | SYSTOLIC BLOOD PRESSURE: 104 MMHG | HEIGHT: 63 IN | OXYGEN SATURATION: 97 % | DIASTOLIC BLOOD PRESSURE: 68 MMHG | WEIGHT: 128.09 LBS

## 2024-08-07 DIAGNOSIS — R06.02 SHORTNESS OF BREATH: ICD-10-CM

## 2024-08-07 DIAGNOSIS — J30.89 ENVIRONMENTAL AND SEASONAL ALLERGIES: ICD-10-CM

## 2024-08-07 DIAGNOSIS — Z11.3 SCREENING EXAMINATION FOR STI: ICD-10-CM

## 2024-08-07 PROCEDURE — G0475 HIV COMBINATION ASSAY: HCPCS

## 2024-08-07 PROCEDURE — 99214 OFFICE O/P EST MOD 30 MIN: CPT | Performed by: PHYSICIAN ASSISTANT

## 2024-08-07 PROCEDURE — 86696 HERPES SIMPLEX TYPE 2 TEST: CPT

## 2024-08-07 PROCEDURE — 86780 TREPONEMA PALLIDUM: CPT

## 2024-08-07 PROCEDURE — 86803 HEPATITIS C AB TEST: CPT

## 2024-08-07 PROCEDURE — 86704 HEP B CORE ANTIBODY TOTAL: CPT

## 2024-08-07 PROCEDURE — 86695 HERPES SIMPLEX TYPE 1 TEST: CPT

## 2024-08-07 PROCEDURE — 3074F SYST BP LT 130 MM HG: CPT | Performed by: PHYSICIAN ASSISTANT

## 2024-08-07 PROCEDURE — 3078F DIAST BP <80 MM HG: CPT | Performed by: PHYSICIAN ASSISTANT

## 2024-08-07 PROCEDURE — 86694 HERPES SIMPLEX NES ANTBDY: CPT

## 2024-08-07 RX ORDER — ALBUTEROL SULFATE 90 UG/1
2 AEROSOL, METERED RESPIRATORY (INHALATION) EVERY 4 HOURS PRN
Qty: 18 EACH | Refills: 0 | Status: SHIPPED | OUTPATIENT
Start: 2024-08-07

## 2024-08-07 RX ORDER — MONTELUKAST SODIUM 10 MG/1
10 TABLET ORAL DAILY
Qty: 90 TABLET | Refills: 1 | Status: SHIPPED | OUTPATIENT
Start: 2024-08-07

## 2024-08-07 ASSESSMENT — PATIENT HEALTH QUESTIONNAIRE - PHQ9
6. FEELING BAD ABOUT YOURSELF - OR THAT YOU ARE A FAILURE OR HAVE LET YOURSELF OR YOUR FAMILY DOWN: NOT AL ALL
SUM OF ALL RESPONSES TO PHQ QUESTIONS 1-9: 0
9. THOUGHTS THAT YOU WOULD BE BETTER OFF DEAD, OR OF HURTING YOURSELF: NOT AT ALL
SUM OF ALL RESPONSES TO PHQ9 QUESTIONS 1 AND 2: 0
4. FEELING TIRED OR HAVING LITTLE ENERGY: NOT AT ALL
5. POOR APPETITE OR OVEREATING: NOT AT ALL
1. LITTLE INTEREST OR PLEASURE IN DOING THINGS: NOT AT ALL
7. TROUBLE CONCENTRATING ON THINGS, SUCH AS READING THE NEWSPAPER OR WATCHING TELEVISION: NOT AT ALL
2. FEELING DOWN, DEPRESSED, IRRITABLE, OR HOPELESS: NOT AT ALL
3. TROUBLE FALLING OR STAYING ASLEEP OR SLEEPING TOO MUCH: NOT AT ALL
8. MOVING OR SPEAKING SO SLOWLY THAT OTHER PEOPLE COULD HAVE NOTICED. OR THE OPPOSITE, BEING SO FIGETY OR RESTLESS THAT YOU HAVE BEEN MOVING AROUND A LOT MORE THAN USUAL: NOT AT ALL

## 2024-08-07 ASSESSMENT — FIBROSIS 4 INDEX: FIB4 SCORE: 0.35

## 2024-08-07 NOTE — PROGRESS NOTES
"cc:  sti screen    Subjective:     Brandie Hunter is a 23 y.o. female presenting for sti screen        History of Present Illness  The patient is a 23-year-old female here to obtain lab work for STI screening.    She is seeking screening for sexually transmitted infections (STIs) due to her recent sexual activity.    She reports experiencing significant issues with her throat and lungs. Occasionally, she experiences breathlessness and throbbing pain in her back and chest, which she suspects may be due to smoking. She also reports a sensation of a lump in her throat, which she describes as feeling tight. She is uncertain if this is due to swollen lymph nodes or a lymph node. She has a history of enlarged lymph nodes, which were previously biopsied and found to be benign. She occasionally experiences nasal congestion and frequent nose blowing, as well as nosebleeds. She has not undergone allergy testing but is interested in doing so. She is making efforts to quit smoking and frequently needs to take deep breaths.    SOCIAL HISTORY  She quit vaping 2 months ago. She still smokes marijuana.       Review of systems:  See above.   Denies any symptoms unless previously indicated.        Current Outpatient Medications:     montelukast (SINGULAIR) 10 MG Tab, Take 1 Tablet by mouth every day., Disp: 90 Tablet, Rfl: 1    albuterol 108 (90 Base) MCG/ACT Aero Soln inhalation aerosol, Inhale 2 Puffs every four hours as needed for Shortness of Breath., Disp: 18 Each, Rfl: 0    Allergies, past medical history, past surgical history, family history, social history reviewed and updated    Objective:     Vitals: /68 (BP Location: Left arm, Patient Position: Sitting, BP Cuff Size: Small adult)   Pulse 75   Temp 36.6 °C (97.9 °F) (Temporal)   Resp 14   Ht 1.6 m (5' 3\")   Wt 58.1 kg (128 lb 1.4 oz)   LMP 07/14/2024 (Approximate)   SpO2 97%   BMI 22.69 kg/m²   General: Alert, pleasant, NAD  EYES:   PERRL, EOMI, no " icterus or pallor.  Conjunctivae and lids normal.   HENT:  Normocephalic.  External ears normal.   Neck supple.  Left anterior cervical  lymphadenopathy.  Heart: Regular rate and rhythm.  S1 and S2 normal.  No murmurs appreciated.  Respiratory: Normal respiratory effort.  Clear to auscultation bilaterally. Decreased breath sounds all lung fields  Abdomen: Not obese  Skin: Warm, dry, no rashes.  Musculoskeletal: Moves all extremities well.    Extremities: normal range of motion all extremities.   Neurological: No tremors, sensation grossly intact, p CN2-12 intact.  Psych:  Affect/mood is normal, judgement is good, memory is intact, grooming is appropriate.    Physical Exam  Decreased breath sounds in the lungs.       Results  Laboratory Studies  Herpes test remain positive.       Assessment/Plan:     Brandie was seen today for sexually transmitted diseases and breathing problem.    Diagnoses and all orders for this visit:    Screening examination for STI  -     HIV AG/AB COMBO ASSAY DIAGNOSTIC; Future  -     Chlamydia/GC, PCR (Urine); Future  -     RPR (SYPHILIS); Future  -     TRICHOMONAS CULTURE  -     HEP C VIRUS ANTIBODY; Future  -     HEP B CORE AB TOTAL; Future  -     HSV 1/2 IGG W/ TYPE SPECIFIC RFLX; Future    Environmental and seasonal allergies  -     FOOD #1 (61)  -     ALLERGY ZONE 15    Shortness of breath  -     montelukast (SINGULAIR) 10 MG Tab; Take 1 Tablet by mouth every day.  -     albuterol 108 (90 Base) MCG/ACT Aero Soln inhalation aerosol; Inhale 2 Puffs every four hours as needed for Shortness of Breath.        Assessment & Plan  1. STI screening.  STI testing labs have been ordered to evaluate further. Follow up in 4 weeks with test results.    2. Environmental seasonal allergies/shortness of breath.  There is a possible asthma component. I will start the patient on albuterol inhaler as well as Singulair.    Follow-up  The patient will follow up in 3 to 4 weeks with test results including  allergy testing.    Return in about 4 weeks (around 9/4/2024), or if symptoms worsen or fail to improve, for 3-4 weeks.    Please note that this dictation was created using voice recognition software. I have made every reasonable attempt to correct obvious errors, but expect that there are errors of grammar and possible content that I did not discover before finalizing note.

## 2024-08-10 LAB
HSV1 GG IGG SER-ACNC: 57.9 IV
HSV1+2 IGG SER IA-ACNC: >22.4 IV
HSV2 GG IGG SER-ACNC: 0.08 IV

## 2024-08-31 ENCOUNTER — APPOINTMENT (OUTPATIENT)
Dept: LAB | Facility: MEDICAL CENTER | Age: 24
End: 2024-08-31

## 2024-09-04 ENCOUNTER — OFFICE VISIT (OUTPATIENT)
Dept: URGENT CARE | Facility: PHYSICIAN GROUP | Age: 24
End: 2024-09-04
Payer: COMMERCIAL

## 2024-09-04 ENCOUNTER — HOSPITAL ENCOUNTER (OUTPATIENT)
Dept: LAB | Facility: MEDICAL CENTER | Age: 24
End: 2024-09-04
Attending: FAMILY MEDICINE
Payer: COMMERCIAL

## 2024-09-04 VITALS
HEART RATE: 67 BPM | HEIGHT: 63 IN | TEMPERATURE: 97.2 F | SYSTOLIC BLOOD PRESSURE: 106 MMHG | WEIGHT: 132.4 LBS | BODY MASS INDEX: 23.46 KG/M2 | DIASTOLIC BLOOD PRESSURE: 68 MMHG | OXYGEN SATURATION: 97 % | RESPIRATION RATE: 20 BRPM

## 2024-09-04 DIAGNOSIS — Z20.2 POSSIBLE EXPOSURE TO STD: ICD-10-CM

## 2024-09-04 PROBLEM — J02.0 STREP THROAT: Status: RESOLVED | Noted: 2023-02-15 | Resolved: 2024-09-04

## 2024-09-04 PROBLEM — S20.212A CONTUSION OF RIB ON LEFT SIDE: Status: RESOLVED | Noted: 2023-06-15 | Resolved: 2024-09-04

## 2024-09-04 PROBLEM — N89.8 VAGINAL DISCHARGE: Status: RESOLVED | Noted: 2020-10-28 | Resolved: 2024-09-04

## 2024-09-04 PROBLEM — R10.84 GENERALIZED ABDOMINAL PAIN: Status: RESOLVED | Noted: 2022-07-11 | Resolved: 2024-09-04

## 2024-09-04 PROBLEM — R53.81 MALAISE: Status: RESOLVED | Noted: 2023-02-15 | Resolved: 2024-09-04

## 2024-09-04 PROBLEM — R19.5 LOOSE STOOLS: Status: RESOLVED | Noted: 2022-07-11 | Resolved: 2024-09-04

## 2024-09-04 PROBLEM — R06.02 SHORTNESS OF BREATH: Status: RESOLVED | Noted: 2024-08-07 | Resolved: 2024-09-04

## 2024-09-04 PROBLEM — R09.81 NASAL CONGESTION: Status: RESOLVED | Noted: 2021-12-01 | Resolved: 2024-09-04

## 2024-09-04 PROBLEM — R14.0 BLOATING: Status: RESOLVED | Noted: 2022-07-11 | Resolved: 2024-09-04

## 2024-09-04 PROBLEM — R05.9 COUGH: Status: RESOLVED | Noted: 2021-12-22 | Resolved: 2024-09-04

## 2024-09-04 LAB
APPEARANCE UR: CLEAR
BILIRUB UR STRIP-MCNC: NORMAL MG/DL
C TRACH DNA GENITAL QL NAA+PROBE: NEGATIVE
COLOR UR AUTO: YELLOW
GLUCOSE UR STRIP.AUTO-MCNC: NORMAL MG/DL
HBV CORE AB SERPL QL IA: NONREACTIVE
HBV SURFACE AB SERPL IA-ACNC: 5.09 MIU/ML (ref 0–10)
HBV SURFACE AG SER QL: NORMAL
HCV AB SER QL: NORMAL
HIV 1+2 AB+HIV1 P24 AG SERPL QL IA: NORMAL
KETONES UR STRIP.AUTO-MCNC: NORMAL MG/DL
LEUKOCYTE ESTERASE UR QL STRIP.AUTO: NORMAL
N GONORRHOEA DNA GENITAL QL NAA+PROBE: NEGATIVE
NITRITE UR QL STRIP.AUTO: NORMAL
PH UR STRIP.AUTO: 7 [PH] (ref 5–8)
POCT INT CON NEG: NEGATIVE
POCT INT CON POS: POSITIVE
POCT URINE PREGNANCY TEST: NEGATIVE
PROT UR QL STRIP: NORMAL MG/DL
RBC UR QL AUTO: NORMAL
SP GR UR STRIP.AUTO: 1.02
SPECIMEN SOURCE: NORMAL
T PALLIDUM AB SER QL IA: NORMAL
UROBILINOGEN UR STRIP-MCNC: 0.2 MG/DL

## 2024-09-04 PROCEDURE — 86780 TREPONEMA PALLIDUM: CPT

## 2024-09-04 PROCEDURE — 86803 HEPATITIS C AB TEST: CPT

## 2024-09-04 PROCEDURE — 81002 URINALYSIS NONAUTO W/O SCOPE: CPT | Mod: QW | Performed by: FAMILY MEDICINE

## 2024-09-04 PROCEDURE — 87591 N.GONORRHOEAE DNA AMP PROB: CPT

## 2024-09-04 PROCEDURE — 86706 HEP B SURFACE ANTIBODY: CPT

## 2024-09-04 PROCEDURE — 86695 HERPES SIMPLEX TYPE 1 TEST: CPT

## 2024-09-04 PROCEDURE — 87389 HIV-1 AG W/HIV-1&-2 AB AG IA: CPT

## 2024-09-04 PROCEDURE — 86694 HERPES SIMPLEX NES ANTBDY: CPT

## 2024-09-04 PROCEDURE — 99214 OFFICE O/P EST MOD 30 MIN: CPT | Mod: 25 | Performed by: FAMILY MEDICINE

## 2024-09-04 PROCEDURE — 87340 HEPATITIS B SURFACE AG IA: CPT

## 2024-09-04 PROCEDURE — 86696 HERPES SIMPLEX TYPE 2 TEST: CPT

## 2024-09-04 PROCEDURE — 3078F DIAST BP <80 MM HG: CPT | Performed by: FAMILY MEDICINE

## 2024-09-04 PROCEDURE — 87491 CHLMYD TRACH DNA AMP PROBE: CPT

## 2024-09-04 PROCEDURE — 86704 HEP B CORE ANTIBODY TOTAL: CPT

## 2024-09-04 PROCEDURE — 3074F SYST BP LT 130 MM HG: CPT | Performed by: FAMILY MEDICINE

## 2024-09-04 PROCEDURE — 81025 URINE PREGNANCY TEST: CPT | Mod: QW | Performed by: FAMILY MEDICINE

## 2024-09-04 PROCEDURE — 36415 COLL VENOUS BLD VENIPUNCTURE: CPT

## 2024-09-04 NOTE — PROGRESS NOTES
"  Subjective:      24 y.o. female presents to urgent care for STD testing.  Currently she is asymptomatic without any dysuria, vaginal rashes/lesions, odor, or changes in discharge.  She is currently sexually active with one, male partner and they use condoms for contraception.  Her partner told her today that he is experiencing STD symptoms, but did not elaborate on which symptoms he is experiencing.    She denies any other questions or concerns at this time.    Current problem list, medication, and past medical/surgical history were reviewed in Epic.    ROS  See HPI     Objective:      /68   Pulse 67   Temp 36.2 °C (97.2 °F) (Temporal)   Resp 20   Ht 1.6 m (5' 3\")   Wt 60.1 kg (132 lb 6.4 oz)   LMP 07/14/2024 (Approximate)   SpO2 97%   BMI 23.45 kg/m²     Physical Exam  Constitutional:       General: She is not in acute distress.     Appearance: She is not diaphoretic.   Cardiovascular:      Rate and Rhythm: Normal rate and regular rhythm.      Heart sounds: Normal heart sounds.   Pulmonary:      Effort: Pulmonary effort is normal. No respiratory distress.      Breath sounds: Normal breath sounds.   Neurological:      Mental Status: She is alert.   Psychiatric:         Mood and Affect: Affect normal.         Judgment: Judgment normal.       Assessment/Plan:     1. Possible exposure to STD  hCG negative.  No sign of infection on urinalysis.  Full STD panel has been ordered, will treat appropriately once resulted.  - Chlamydia/GC, PCR (Genital/Anal swab); Future  - HIV AG/AB Combo Assay Screening; Future  - T.Pallidum AB TANIA (Screening); Future  - Hepatitis C Virus Antibody; Future  - HEP B Surface Antibody; Future  - Hep B Core AB Total; Future  - Hep B Surface Antigen; Future  - HSV 1/2 IGG W/ TYPE SPECIFIC RFLX; Future  - POCT Urinalysis  - POCT PREGNANCY      Instructed to return to Urgent Care or nearest Emergency Department if symptoms fail to improve, for any change in condition, further " concerns, or new concerning symptoms. Patient states understanding of the plan of care and discharge instructions.    Flaca Larios M.D.

## 2024-09-06 LAB — HSV1+2 IGG SER IA-ACNC: >22.4 IV

## 2024-09-07 LAB
HSV1 GG IGG SER-ACNC: 44.1 IV
HSV2 GG IGG SER-ACNC: 0.05 IV

## 2024-09-20 ENCOUNTER — OFFICE VISIT (OUTPATIENT)
Dept: URGENT CARE | Facility: PHYSICIAN GROUP | Age: 24
End: 2024-09-20
Payer: COMMERCIAL

## 2024-09-20 VITALS
HEART RATE: 64 BPM | WEIGHT: 128.4 LBS | OXYGEN SATURATION: 99 % | SYSTOLIC BLOOD PRESSURE: 118 MMHG | BODY MASS INDEX: 22.75 KG/M2 | DIASTOLIC BLOOD PRESSURE: 62 MMHG | TEMPERATURE: 98 F | RESPIRATION RATE: 20 BRPM | HEIGHT: 63 IN

## 2024-09-20 DIAGNOSIS — L92.3 TATTOO REACTION: ICD-10-CM

## 2024-09-20 PROCEDURE — 3078F DIAST BP <80 MM HG: CPT | Performed by: FAMILY MEDICINE

## 2024-09-20 PROCEDURE — 3074F SYST BP LT 130 MM HG: CPT | Performed by: FAMILY MEDICINE

## 2024-09-20 PROCEDURE — 99213 OFFICE O/P EST LOW 20 MIN: CPT | Performed by: FAMILY MEDICINE

## 2024-09-20 NOTE — PROGRESS NOTES
"  Subjective:      24 y.o. female presents to urgent care for concerns of possible infected tattoo.  On Saturday she had a tattoo to the left side of her neck, for the first couple of days it just felt like she had a sunburn.  Since then she has been experiencing bumps, redness, and pain.  Pain is currently rated 5/10.  She has been using Benadryl and sparing the tattoo with alcohol. She remains afebrile. She is eating and drinking normally. Energy is at baseline.    She denies any other questions or concerns at this time.    Current problem list, medication, and past medical/surgical history were reviewed in Epic.    ROS  See HPI     Objective:      /62   Pulse 64   Temp 36.7 °C (98 °F) (Temporal)   Resp 20   Ht 1.6 m (5' 3\")   Wt 58.2 kg (128 lb 6.4 oz)   SpO2 99%   BMI 22.75 kg/m²     Physical Exam  Constitutional:       General: She is not in acute distress.     Appearance: She is not diaphoretic.   Cardiovascular:      Rate and Rhythm: Normal rate and regular rhythm.      Heart sounds: Normal heart sounds.   Pulmonary:      Effort: Pulmonary effort is normal. No respiratory distress.      Breath sounds: Normal breath sounds.   Skin:     Comments: Tattoo to the left side of her neck with erythema and edema.  No open areas or discharge.   Neurological:      Mental Status: She is alert.   Psychiatric:         Mood and Affect: Affect normal.         Judgment: Judgment normal.       Assessment/Plan:     1. Tattoo reaction  Prescription for Keflex has been sent.  - cephALEXin (KEFLEX) 250 MG Cap; Take 1 Capsule by mouth 3 times a day for 5 days.  Dispense: 15 Capsule; Refill: 0      Instructed to return to Urgent Care or nearest Emergency Department if symptoms fail to improve, for any change in condition, further concerns, or new concerning symptoms. Patient states understanding of the plan of care and discharge instructions.    Flaca Larios M.D.   "

## 2024-10-03 ENCOUNTER — APPOINTMENT (OUTPATIENT)
Dept: MEDICAL GROUP | Facility: CLINIC | Age: 24
End: 2024-10-03
Payer: COMMERCIAL

## 2024-10-03 VITALS
HEIGHT: 63 IN | RESPIRATION RATE: 20 BRPM | OXYGEN SATURATION: 96 % | DIASTOLIC BLOOD PRESSURE: 70 MMHG | WEIGHT: 126.54 LBS | TEMPERATURE: 97.5 F | BODY MASS INDEX: 22.42 KG/M2 | HEART RATE: 67 BPM | SYSTOLIC BLOOD PRESSURE: 118 MMHG

## 2024-10-03 DIAGNOSIS — R11.0 NAUSEA: ICD-10-CM

## 2024-10-03 DIAGNOSIS — R06.02 SHORTNESS OF BREATH: ICD-10-CM

## 2024-10-03 DIAGNOSIS — R00.1 BRADYCARDIA: ICD-10-CM

## 2024-10-03 DIAGNOSIS — R07.89 OTHER CHEST PAIN: ICD-10-CM

## 2024-10-03 DIAGNOSIS — Z11.3 ROUTINE SCREENING FOR STI (SEXUALLY TRANSMITTED INFECTION): ICD-10-CM

## 2024-10-03 PROCEDURE — 3074F SYST BP LT 130 MM HG: CPT | Performed by: PHYSICIAN ASSISTANT

## 2024-10-03 PROCEDURE — 3078F DIAST BP <80 MM HG: CPT | Performed by: PHYSICIAN ASSISTANT

## 2024-10-03 PROCEDURE — 99214 OFFICE O/P EST MOD 30 MIN: CPT | Performed by: PHYSICIAN ASSISTANT

## 2024-10-03 RX ORDER — FLUTICASONE PROPIONATE AND SALMETEROL 100; 50 UG/1; UG/1
1 POWDER RESPIRATORY (INHALATION) EVERY 12 HOURS
Qty: 1 EACH | Refills: 3 | Status: SHIPPED | OUTPATIENT
Start: 2024-10-03

## 2024-12-15 ENCOUNTER — OFFICE VISIT (OUTPATIENT)
Dept: URGENT CARE | Facility: PHYSICIAN GROUP | Age: 24
End: 2024-12-15
Payer: COMMERCIAL

## 2024-12-15 VITALS
OXYGEN SATURATION: 97 % | SYSTOLIC BLOOD PRESSURE: 104 MMHG | WEIGHT: 127.2 LBS | HEART RATE: 79 BPM | DIASTOLIC BLOOD PRESSURE: 70 MMHG | TEMPERATURE: 98.5 F | BODY MASS INDEX: 22.54 KG/M2 | HEIGHT: 63 IN | RESPIRATION RATE: 14 BRPM

## 2024-12-15 DIAGNOSIS — J02.0 PHARYNGITIS DUE TO STREPTOCOCCUS SPECIES: Primary | ICD-10-CM

## 2024-12-15 LAB
HETEROPH AB SER QL LA: NEGATIVE
POCT INT CON NEG: NEGATIVE
POCT INT CON POS: POSITIVE
S PYO DNA SPEC NAA+PROBE: DETECTED

## 2024-12-15 PROCEDURE — 86308 HETEROPHILE ANTIBODY SCREEN: CPT | Performed by: PHYSICIAN ASSISTANT

## 2024-12-15 PROCEDURE — 3078F DIAST BP <80 MM HG: CPT | Performed by: PHYSICIAN ASSISTANT

## 2024-12-15 PROCEDURE — 3074F SYST BP LT 130 MM HG: CPT | Performed by: PHYSICIAN ASSISTANT

## 2024-12-15 PROCEDURE — 99213 OFFICE O/P EST LOW 20 MIN: CPT | Performed by: PHYSICIAN ASSISTANT

## 2024-12-15 PROCEDURE — 87651 STREP A DNA AMP PROBE: CPT | Performed by: PHYSICIAN ASSISTANT

## 2024-12-15 RX ORDER — AMOXICILLIN 500 MG/1
500 CAPSULE ORAL 2 TIMES DAILY
Qty: 20 CAPSULE | Refills: 0 | Status: SHIPPED | OUTPATIENT
Start: 2024-12-15 | End: 2024-12-25

## 2024-12-15 ASSESSMENT — ENCOUNTER SYMPTOMS
CONSTIPATION: 0
DIAPHORESIS: 0
COUGH: 0
EYE PAIN: 0
CHILLS: 0
SORE THROAT: 1
NAUSEA: 1
VOMITING: 0
FEVER: 0
HEADACHES: 0
WHEEZING: 0
EYE DISCHARGE: 0
DIARRHEA: 1
ABDOMINAL PAIN: 0
SHORTNESS OF BREATH: 0
DIZZINESS: 0
SINUS PAIN: 0
EYE REDNESS: 0

## 2024-12-15 NOTE — PROGRESS NOTES
"  Subjective:     Brandie Hunter  is a 24 y.o. female who presents for Pharyngitis (Pt states she's had a sore throat since Friday night, she now has a headache as well. Some body aches and fatigue. )       She presents today with sore throat that began 2 days ago and has been worsening since onset.  She has bodyaches, fatigue.  Notes a mild headache.  Pain with swallowing, no difficulties with swallowing.  She did have nausea this morning and an episode of diarrhea.  No chest pain or shortness of breath, no severe abdominal pains.  No fevers.       Review of Systems   Constitutional:  Positive for malaise/fatigue. Negative for chills, diaphoresis and fever.   HENT:  Positive for sore throat. Negative for congestion, ear discharge and sinus pain.    Eyes:  Negative for pain, discharge and redness.   Respiratory:  Negative for cough, shortness of breath and wheezing.    Cardiovascular:  Negative for chest pain.   Gastrointestinal:  Positive for diarrhea and nausea. Negative for abdominal pain, constipation and vomiting.   Neurological:  Negative for dizziness and headaches.      No Known Allergies  Past Medical History:   Diagnosis Date    Abdominal discomfort 1/12/2022    Anxiety     Bacterial vaginosis 1/12/2022    Depression     Head ache     Heart murmur     as a baby    High risk heterosexual behavior 12/22/2021    Ingrown toenail 10/6/2020    Irregular periods 12/21/2020    Menarche 8/9/2014    Nausea 10/6/2020    Swollen lymph nodes 1/12/2022    Vaginal discharge 10/28/2020    Weight loss 12/21/2020        Objective:   /70 (BP Location: Right arm, Patient Position: Sitting, BP Cuff Size: Adult)   Pulse 79   Temp 36.9 °C (98.5 °F) (Temporal)   Resp 14   Ht 1.6 m (5' 3\")   Wt 57.7 kg (127 lb 3.2 oz)   SpO2 97%   BMI 22.53 kg/m²   Physical Exam  Vitals and nursing note reviewed.   Constitutional:       General: She is not in acute distress.     Appearance: Normal appearance. She is not " ill-appearing, toxic-appearing or diaphoretic.   HENT:      Head: Normocephalic.      Right Ear: Tympanic membrane, ear canal and external ear normal. There is no impacted cerumen.      Left Ear: Tympanic membrane, ear canal and external ear normal. There is no impacted cerumen.      Nose: No congestion or rhinorrhea.      Mouth/Throat:      Mouth: Mucous membranes are moist.      Pharynx: Posterior oropharyngeal erythema present. No oropharyngeal exudate.      Comments: No tonsillar swelling, bilaterally.  Insert throat  Eyes:      General:         Right eye: No discharge.         Left eye: No discharge.      Conjunctiva/sclera: Conjunctivae normal.   Cardiovascular:      Rate and Rhythm: Normal rate and regular rhythm.   Pulmonary:      Effort: Pulmonary effort is normal. No respiratory distress.      Breath sounds: Normal breath sounds. No stridor. No wheezing or rhonchi.   Musculoskeletal:      Cervical back: Neck supple.   Lymphadenopathy:      Cervical: Cervical adenopathy present.   Neurological:      General: No focal deficit present.      Mental Status: She is alert and oriented to person, place, and time.   Psychiatric:         Mood and Affect: Mood normal.         Behavior: Behavior normal.         Thought Content: Thought content normal.         Judgment: Judgment normal.             Diagnostic testing:    Keanu Strep -positive, notified via AppSheet message    Monospot-negative, discussed during office visit    Assessment/Plan:     Encounter Diagnoses   Name Primary?    Pharyngitis due to Streptococcus species Yes          Plan for care for today's complaint includes obtaining strep and mono testing today, this was negative for mono but positive for strep.  Started on amoxicillin.  Did discuss appropriate hygiene techniques to prevent coinfection or reinfection. This includes getting a new tooth brush after 24 hours of antibiotic treatment, cleaning all water bottles and other objects that go in and  around the mouth.  Frequent handwashing and cleaning of commonly touched surfaces.  Vital signs were stable during today's office visit, patient was overall well-appearing. Continue to monitor symptoms and return to urgent care or follow-up with primary care provider if symptoms remain ongoing.  Follow-up in the emergency department if symptoms become severe, ER precautions discussed in office today..  Prescription for amoxicillin provided.    See AVS Instructions below for written guidance provided to patient on after-visit management and care in addition to our verbal discussion during the visit.    Please note that this dictation was created using voice recognition software. I have attempted to correct all errors, but there may be sound-alike, spelling, grammar and possibly content errors that I did not discover before finalizing the note.    Golden Trianglesaji Lyons PA-C

## 2024-12-15 NOTE — LETTER
[facility]  [facility] [address] [address]     December 15, 2024    Patient: Brandie Hunter   YOB: 2000   Date of Visit: 12/15/2024       To Whom It May Concern:    Brandie Hunter was seen and treated in our department on 12/15/2024.  Please excuse from work on 12/15 and 12/16    Sincerely,     Berny Lyons P.A.-C.

## 2025-02-05 ENCOUNTER — HOSPITAL ENCOUNTER (OUTPATIENT)
Dept: LAB | Facility: MEDICAL CENTER | Age: 25
End: 2025-02-05
Payer: COMMERCIAL

## 2025-02-05 ENCOUNTER — OFFICE VISIT (OUTPATIENT)
Dept: URGENT CARE | Facility: PHYSICIAN GROUP | Age: 25
End: 2025-02-05
Payer: COMMERCIAL

## 2025-02-05 VITALS
SYSTOLIC BLOOD PRESSURE: 116 MMHG | WEIGHT: 123 LBS | HEART RATE: 86 BPM | OXYGEN SATURATION: 98 % | TEMPERATURE: 97.4 F | RESPIRATION RATE: 16 BRPM | DIASTOLIC BLOOD PRESSURE: 72 MMHG | HEIGHT: 63 IN | BODY MASS INDEX: 21.79 KG/M2

## 2025-02-05 DIAGNOSIS — L20.9 ATOPIC DERMATITIS OF BOTH HANDS: ICD-10-CM

## 2025-02-05 DIAGNOSIS — Z3A.01 LESS THAN 8 WEEKS GESTATION OF PREGNANCY: ICD-10-CM

## 2025-02-05 DIAGNOSIS — Z20.2 POSSIBLE EXPOSURE TO STD: ICD-10-CM

## 2025-02-05 DIAGNOSIS — R35.0 URINARY FREQUENCY: ICD-10-CM

## 2025-02-05 LAB
APPEARANCE UR: CLEAR
BILIRUB UR STRIP-MCNC: NEGATIVE MG/DL
COLOR UR AUTO: YELLOW
GLUCOSE UR STRIP.AUTO-MCNC: NEGATIVE MG/DL
HBV CORE AB SERPL QL IA: NONREACTIVE
HBV SURFACE AB SERPL IA-ACNC: 5.05 MIU/ML (ref 0–10)
HBV SURFACE AG SER QL: NORMAL
HCV AB SER QL: NORMAL
HIV 1+2 AB+HIV1 P24 AG SERPL QL IA: NORMAL
KETONES UR STRIP.AUTO-MCNC: NEGATIVE MG/DL
LEUKOCYTE ESTERASE UR QL STRIP.AUTO: NEGATIVE
NITRITE UR QL STRIP.AUTO: NEGATIVE
PH UR STRIP.AUTO: 7 [PH] (ref 5–8)
POCT INT CON NEG: NEGATIVE
POCT INT CON POS: POSITIVE
POCT URINE PREGNANCY TEST: POSITIVE
PROT UR QL STRIP: NEGATIVE MG/DL
RBC UR QL AUTO: NEGATIVE
SP GR UR STRIP.AUTO: 1.02
T PALLIDUM AB SER QL IA: NORMAL
UROBILINOGEN UR STRIP-MCNC: NEGATIVE MG/DL

## 2025-02-05 PROCEDURE — 36415 COLL VENOUS BLD VENIPUNCTURE: CPT

## 2025-02-05 PROCEDURE — 86780 TREPONEMA PALLIDUM: CPT

## 2025-02-05 PROCEDURE — 86704 HEP B CORE ANTIBODY TOTAL: CPT

## 2025-02-05 PROCEDURE — 87591 N.GONORRHOEAE DNA AMP PROB: CPT

## 2025-02-05 PROCEDURE — 87340 HEPATITIS B SURFACE AG IA: CPT

## 2025-02-05 PROCEDURE — 3078F DIAST BP <80 MM HG: CPT

## 2025-02-05 PROCEDURE — 81025 URINE PREGNANCY TEST: CPT

## 2025-02-05 PROCEDURE — 86803 HEPATITIS C AB TEST: CPT

## 2025-02-05 PROCEDURE — 3074F SYST BP LT 130 MM HG: CPT

## 2025-02-05 PROCEDURE — 81002 URINALYSIS NONAUTO W/O SCOPE: CPT

## 2025-02-05 PROCEDURE — 87389 HIV-1 AG W/HIV-1&-2 AB AG IA: CPT

## 2025-02-05 PROCEDURE — 99213 OFFICE O/P EST LOW 20 MIN: CPT

## 2025-02-05 PROCEDURE — 87491 CHLMYD TRACH DNA AMP PROBE: CPT

## 2025-02-05 PROCEDURE — 86706 HEP B SURFACE ANTIBODY: CPT

## 2025-02-05 PROCEDURE — 87661 TRICHOMONAS VAGINALIS AMPLIF: CPT

## 2025-02-05 RX ORDER — TRIAMCINOLONE ACETONIDE OINTMENT USP, 0.05% 0.5 MG/G
1 OINTMENT TOPICAL 2 TIMES DAILY PRN
Qty: 110 G | Refills: 0 | Status: SHIPPED | OUTPATIENT
Start: 2025-02-05 | End: 2025-02-19

## 2025-02-05 NOTE — PROGRESS NOTES
"Subjective     Brandie Hunter is a 24 y.o. female who presents with Rash (X 1 month/ R hand dry skin/ pt requesting blood HCG )            Rash  This is a new problem. The current episode started more than 1 month ago. The problem has been waxing and waning since onset. The affected locations include the right hand and left hand. The rash is characterized by redness, dryness and itchiness. Pertinent negatives include no fever. Past treatments include nothing. There is no history of allergies, asthma, eczema or varicella.       Review of Systems   Constitutional:  Negative for chills and fever.   Genitourinary:  Positive for frequency. Negative for dysuria, flank pain, hematuria and urgency.   Skin:  Positive for rash.              Objective     /72   Pulse 86   Temp 36.3 °C (97.4 °F) (Temporal)   Resp 16   Ht 1.6 m (5' 3\")   Wt 55.8 kg (123 lb)   LMP 01/07/2025 (Approximate)   SpO2 98%   BMI 21.79 kg/m²      Physical Exam  Constitutional:       General: She is not in acute distress.     Appearance: Normal appearance. She is not ill-appearing.   HENT:      Head: Normocephalic and atraumatic.      Right Ear: Tympanic membrane is not erythematous.      Left Ear: Tympanic membrane is not erythematous.      Nose: No congestion.      Mouth/Throat:      Mouth: Mucous membranes are moist.   Eyes:      Extraocular Movements: Extraocular movements intact.      Conjunctiva/sclera: Conjunctivae normal.      Pupils: Pupils are equal, round, and reactive to light.   Cardiovascular:      Rate and Rhythm: Normal rate and regular rhythm.   Pulmonary:      Effort: Pulmonary effort is normal. No respiratory distress.      Breath sounds: No stridor. No wheezing.   Abdominal:      General: Abdomen is flat.      Palpations: Abdomen is soft.      Tenderness: There is no abdominal tenderness. There is no right CVA tenderness, left CVA tenderness, guarding or rebound.   Musculoskeletal:         General: Normal range of " motion.   Skin:     General: Skin is dry.             Comments: Healing mild erythema to bilateral hands along dorsal knuckles.  Rash appearing scaly, patient reports was flaking prior to appointment, but resolves with lotion.   Neurological:      General: No focal deficit present.      Mental Status: She is alert and oriented to person, place, and time. Mental status is at baseline.      Motor: No weakness.                  Assessment & Plan        Assessment & Plan  Atopic dermatitis of both hands    Orders:    TRIAMCINOLONE ACETONIDE, TOP, 0.05 % Ointment; Apply 1 Application topically 2 times a day as needed (apply small amount to affected areas on hands.  Do not use on face) for up to 14 days.    Urinary frequency    Orders:    POCT Pregnancy    POCT Urinalysis    Results for orders placed or performed in visit on 02/05/25   POCT Pregnancy    Collection Time: 02/05/25  1:40 PM   Result Value Ref Range    POC Urine Pregnancy Test Positive     Internal Control Positive Positive     Internal Control Negative Negative    POCT Urinalysis    Collection Time: 02/05/25  1:40 PM   Result Value Ref Range    POC Color yellow Negative    POC Appearance clear Negative    POC Glucose negative Negative mg/dL    POC Bilirubin negative Negative mg/dL    POC Ketones negative Negative mg/dL    POC Specific Gravity 1.020     POC Blood negative Negative    POC Urine PH 7.0 5.0 - 8.0    POC Protein negative Negative mg/dL    POC Urobiligen negative Negative (0.2) mg/dL    POC Nitrites negative Negative    POC Leukocyte Esterase negative Negative      Less than 8 weeks gestation of pregnancy    Possible exposure to STD    Orders:    Chlamydia/GC, PCR (Genital/Anal swab); Future    HIV AG/AB Combo Assay Screening; Future    T.Pallidum AB TANIA (Screening); Future    Trichomonas Vaginalis by TMA    Hepatitis C Virus Antibody; Future    HEP B Surface Antibody; Future    Hep B Core AB Total; Future    Hep B Surface Antigen; Future        This is an acute condition.  Patient presenting with urinary frequency for one week, and bilateral hand rash for one month.  Denies dysuria, hematuria, flank pain, suprapubic pain, or GI/ symptoms.    Requesting UA, HCG and STD testing.    HCG positive.  UA negative for UTI at this time.  No c/f pyelnephritis or renal calculi.  Abd exam negative, no red flag symptoms.  No s/s of dehydration.    Bilateral hands healing scaly erythema, consistent with eczema.  Triamcinolone cream BID for 14 days.  Not to be used if rash appears on face.  Patient to return to clinic if rash does not improve for worsens, or she develops  symptoms within 5-7 days.  She is to f/u with current OBGYN for positive HCG.  STD panel pending, will treat accordingly based on results.  Increasing fluid intake and prenatal vitamins also encouraged.  ER for severe abdominal symptoms or vaginal bleeding.    Patient understands and is agreeable to treatment plan.  Denies further questions.

## 2025-02-06 ASSESSMENT — ENCOUNTER SYMPTOMS
FEVER: 0
FLANK PAIN: 0
CHILLS: 0

## 2025-02-08 LAB
SPEC CONTAINER SPEC: NORMAL
SPECIMEN SOURCE: NORMAL
T VAGINALIS RRNA SPEC QL NAA+PROBE: NEGATIVE

## 2025-03-24 NOTE — Clinical Note
REFERRAL APPROVAL NOTICE         Sent on March 24, 2025                   Brandie Hunter  [address] [facility] [address]                   Dear Ms. Hunter,    After a careful review of the medical information and benefit coverage, [facility] has processed your referral. See below for additional details.    If applicable, you must be actively enrolled with your insurance for coverage of the authorized service. If you have any questions regarding your coverage, please contact your insurance directly.    REFERRAL INFORMATION   Referral #:  [id number]  Referred-To Department    Referred-By Provider:  Maternal Fetal Medicine    JASMYNE CONNOLLY D.O.   Maternal Fetal Med      [address]  [facility] [id number] [phone] [address], [address]  [facility] [address] [phone]    Referral Start Date:  03/24/2025  Referral End Date:   03/24/2026             SCHEDULING  If you do not already have an appointment, please call [phone] to make an appointment.     MORE INFORMATION  If you do not already have a [employer] account, sign up at: [email or web address]  You can access your medical information, make appointments, see lab results, billing information, and more.  If you have questions regarding this referral, please contact  the [facility] Referrals department at:             [phone]. Monday - Friday 8:00AM - 5:00PM.     Sincerely,    [facility]

## 2025-04-14 ENCOUNTER — ANCILLARY PROCEDURE (OUTPATIENT)
Dept: MATERNAL FETAL MEDICINE | Facility: MEDICAL CENTER | Age: 25
End: 2025-04-14
Attending: OBSTETRICS & GYNECOLOGY
Payer: COMMERCIAL

## 2025-04-14 VITALS
DIASTOLIC BLOOD PRESSURE: 72 MMHG | HEART RATE: 75 BPM | WEIGHT: 133.5 LBS | BODY MASS INDEX: 23.65 KG/M2 | SYSTOLIC BLOOD PRESSURE: 111 MMHG

## 2025-04-14 DIAGNOSIS — O30.041 DICHORIONIC DIAMNIOTIC TWIN PREGNANCY IN FIRST TRIMESTER: ICD-10-CM

## 2025-04-14 DIAGNOSIS — Z3A.13 13 WEEKS GESTATION OF PREGNANCY: ICD-10-CM

## 2025-04-14 DIAGNOSIS — Z87.74 PERSONAL HISTORY OF (CORRECTED) CONGENITAL MALFORMATIONS OF HEART AND CIRCULATORY SYSTEM: ICD-10-CM

## 2025-04-14 DIAGNOSIS — O41.01X1: ICD-10-CM

## 2025-04-14 PROCEDURE — 76813 OB US NUCHAL MEAS 1 GEST: CPT | Performed by: OBSTETRICS & GYNECOLOGY

## 2025-04-14 PROCEDURE — 76801 OB US < 14 WKS SINGLE FETUS: CPT | Performed by: OBSTETRICS & GYNECOLOGY

## 2025-04-14 PROCEDURE — 99204 OFFICE O/P NEW MOD 45 MIN: CPT | Performed by: OBSTETRICS & GYNECOLOGY

## 2025-04-14 PROCEDURE — 76817 TRANSVAGINAL US OBSTETRIC: CPT | Performed by: OBSTETRICS & GYNECOLOGY

## 2025-04-14 PROCEDURE — 76802 OB US < 14 WKS ADDL FETUS: CPT | Performed by: OBSTETRICS & GYNECOLOGY

## 2025-04-14 NOTE — LETTER
April 14, 2025    To Whom It May Concern:         This is confirmation that Michaelnelsy Dudley Dale attended her scheduled appointment with Braulio Alvarado MD., on 4/14/25.         If you have any questions please do not hesitate to call me at the phone number listed below.    Sincerely,          Jasmin Bustos, Med Ass't  [phone]

## 2025-05-05 ENCOUNTER — ANCILLARY PROCEDURE (OUTPATIENT)
Dept: MATERNAL FETAL MEDICINE | Facility: MEDICAL CENTER | Age: 25
End: 2025-05-05
Attending: OBSTETRICS & GYNECOLOGY
Payer: COMMERCIAL

## 2025-05-05 VITALS
SYSTOLIC BLOOD PRESSURE: 118 MMHG | DIASTOLIC BLOOD PRESSURE: 70 MMHG | WEIGHT: 136 LBS | HEART RATE: 77 BPM | BODY MASS INDEX: 24.09 KG/M2

## 2025-05-05 DIAGNOSIS — Z3A.16 16 WEEKS GESTATION OF PREGNANCY: ICD-10-CM

## 2025-05-05 DIAGNOSIS — O41.02X1 OLIGOHYDRAMNIOS IN SECOND TRIMESTER, FETUS 1 OF MULTIPLE GESTATION: ICD-10-CM

## 2025-05-05 DIAGNOSIS — O30.042 DICHORIONIC DIAMNIOTIC TWIN PREGNANCY IN SECOND TRIMESTER: ICD-10-CM

## 2025-05-05 DIAGNOSIS — O30.041 DICHORIONIC DIAMNIOTIC TWIN PREGNANCY IN FIRST TRIMESTER: ICD-10-CM

## 2025-05-05 PROCEDURE — 76816 OB US FOLLOW-UP PER FETUS: CPT | Performed by: OBSTETRICS & GYNECOLOGY

## 2025-05-05 PROCEDURE — 76821 MIDDLE CEREBRAL ARTERY ECHO: CPT | Performed by: OBSTETRICS & GYNECOLOGY

## 2025-05-05 PROCEDURE — 76820 UMBILICAL ARTERY ECHO: CPT | Performed by: OBSTETRICS & GYNECOLOGY

## 2025-05-05 NOTE — LETTER
May 5, 2025    To Whom It May Concern:         This is confirmation that Brandie Hunter attended her scheduled appointment with Brad Jefferson M.D., Maternal Fetal Medicine on 5/05/25.         If you have any questions please do not hesitate to call me at the phone number listed below.    Sincerely,          Jasmin Bustos, Med Ass't  [phone]

## 2025-05-08 ENCOUNTER — ANCILLARY PROCEDURE (OUTPATIENT)
Dept: MATERNAL FETAL MEDICINE | Facility: MEDICAL CENTER | Age: 25
End: 2025-05-08
Attending: OBSTETRICS & GYNECOLOGY
Payer: COMMERCIAL

## 2025-05-08 VITALS
BODY MASS INDEX: 24.27 KG/M2 | DIASTOLIC BLOOD PRESSURE: 85 MMHG | WEIGHT: 137 LBS | SYSTOLIC BLOOD PRESSURE: 115 MMHG | HEART RATE: 93 BPM

## 2025-05-08 DIAGNOSIS — O30.041 DICHORIONIC DIAMNIOTIC TWIN PREGNANCY IN FIRST TRIMESTER: ICD-10-CM

## 2025-05-08 PROCEDURE — 76820 UMBILICAL ARTERY ECHO: CPT | Performed by: OBSTETRICS & GYNECOLOGY

## 2025-05-08 PROCEDURE — 76821 MIDDLE CEREBRAL ARTERY ECHO: CPT | Performed by: OBSTETRICS & GYNECOLOGY

## 2025-05-08 PROCEDURE — 76815 OB US LIMITED FETUS(S): CPT | Performed by: OBSTETRICS & GYNECOLOGY

## 2025-05-20 ENCOUNTER — ANCILLARY PROCEDURE (OUTPATIENT)
Dept: MATERNAL FETAL MEDICINE | Facility: MEDICAL CENTER | Age: 25
End: 2025-05-20
Payer: COMMERCIAL

## 2025-05-20 VITALS
SYSTOLIC BLOOD PRESSURE: 117 MMHG | BODY MASS INDEX: 25.76 KG/M2 | HEART RATE: 91 BPM | DIASTOLIC BLOOD PRESSURE: 77 MMHG | WEIGHT: 145.4 LBS

## 2025-05-20 DIAGNOSIS — O30.009 MONOZYGOTIC TWIN PREGNANCY: ICD-10-CM

## 2025-05-20 DIAGNOSIS — O41.00X1: ICD-10-CM

## 2025-05-20 DIAGNOSIS — Z3A.18 18 WEEKS GESTATION OF PREGNANCY: ICD-10-CM

## 2025-05-20 DIAGNOSIS — O30.041 DICHORIONIC DIAMNIOTIC TWIN PREGNANCY IN FIRST TRIMESTER: ICD-10-CM

## 2025-05-20 DIAGNOSIS — O30.042 DICHORIONIC DIAMNIOTIC TWIN PREGNANCY IN SECOND TRIMESTER: Primary | ICD-10-CM

## 2025-05-20 DIAGNOSIS — O41.02X1 OLIGOHYDRAMNIOS ANTEPARTUM, SECOND TRIMESTER, FETUS 1: ICD-10-CM

## 2025-05-20 PROCEDURE — 76820 UMBILICAL ARTERY ECHO: CPT | Performed by: OBSTETRICS & GYNECOLOGY

## 2025-05-20 PROCEDURE — 76821 MIDDLE CEREBRAL ARTERY ECHO: CPT | Performed by: OBSTETRICS & GYNECOLOGY

## 2025-05-20 PROCEDURE — 76815 OB US LIMITED FETUS(S): CPT | Performed by: OBSTETRICS & GYNECOLOGY

## 2025-05-21 ENCOUNTER — TELEPHONE (OUTPATIENT)
Dept: MATERNAL FETAL MEDICINE | Facility: MEDICAL CENTER | Age: 25
End: 2025-05-21
Payer: COMMERCIAL

## 2025-05-21 NOTE — TELEPHONE ENCOUNTER
Called ute. Pt is out of network for [facility], waiting on fax to confirm if she is in network with [facility]. Call ref # [id number].

## 2025-05-30 ENCOUNTER — TELEPHONE (OUTPATIENT)
Dept: MATERNAL FETAL MEDICINE | Facility: MEDICAL CENTER | Age: 25
End: 2025-05-30
Payer: COMMERCIAL

## 2025-05-30 NOTE — TELEPHONE ENCOUNTER
Call ref # [id number] ErinD.     Per ute (neftali) No Auth is required for [facility] Tax : [id number] NPI: [id number]. For CPT codes 99205 x2 and 76811 x2. I called patient to give her [facility] information such as phone number and address. Patient was advised to call [phone] by 06/02/25 if she doesn't hear anything from [facility]. All records were faxed to [facility] confirmation in media.

## 2025-06-11 ENCOUNTER — APPOINTMENT (OUTPATIENT)
Dept: MATERNAL FETAL MEDICINE | Facility: MEDICAL CENTER | Age: 25
End: 2025-06-11
Attending: OBSTETRICS & GYNECOLOGY
Payer: COMMERCIAL

## 2025-06-23 ENCOUNTER — ANCILLARY PROCEDURE (OUTPATIENT)
Dept: MATERNAL FETAL MEDICINE | Facility: MEDICAL CENTER | Age: 25
End: 2025-06-23
Payer: COMMERCIAL

## 2025-06-23 VITALS
SYSTOLIC BLOOD PRESSURE: 127 MMHG | DIASTOLIC BLOOD PRESSURE: 82 MMHG | BODY MASS INDEX: 27.86 KG/M2 | WEIGHT: 157.3 LBS | HEART RATE: 83 BPM

## 2025-06-23 DIAGNOSIS — O30.002 MONOZYGOTIC TWINS IN SECOND TRIMESTER: Primary | ICD-10-CM

## 2025-06-23 DIAGNOSIS — O42.919 PRETERM PREMATURE RUPTURE OF MEMBRANES (PPROM) WITH UNKNOWN ONSET OF LABOR: ICD-10-CM

## 2025-06-23 DIAGNOSIS — O35.BXX2 FETAL CARDIAC ANOMALY COMPLICATING PREGNANCY, ANTEPARTUM, FETUS 2: ICD-10-CM

## 2025-06-23 DIAGNOSIS — O30.042 DICHORIONIC DIAMNIOTIC TWIN PREGNANCY IN SECOND TRIMESTER: ICD-10-CM

## 2025-06-23 DIAGNOSIS — O41.02X1 OLIGOHYDRAMNIOS ANTEPARTUM, SECOND TRIMESTER, FETUS 1: ICD-10-CM

## 2025-06-23 DIAGNOSIS — Z3A.23 23 WEEKS GESTATION OF PREGNANCY: ICD-10-CM

## 2025-06-23 PROCEDURE — 76812 OB US DETAILED ADDL FETUS: CPT | Performed by: OBSTETRICS & GYNECOLOGY

## 2025-06-23 PROCEDURE — 99213 OFFICE O/P EST LOW 20 MIN: CPT | Performed by: OBSTETRICS & GYNECOLOGY

## 2025-06-23 PROCEDURE — 76821 MIDDLE CEREBRAL ARTERY ECHO: CPT | Performed by: OBSTETRICS & GYNECOLOGY

## 2025-06-23 PROCEDURE — 76811 OB US DETAILED SNGL FETUS: CPT | Performed by: OBSTETRICS & GYNECOLOGY

## 2025-06-23 PROCEDURE — 76820 UMBILICAL ARTERY ECHO: CPT | Performed by: OBSTETRICS & GYNECOLOGY

## 2025-07-01 ENCOUNTER — APPOINTMENT (OUTPATIENT)
Dept: MATERNAL FETAL MEDICINE | Facility: MEDICAL CENTER | Age: 25
End: 2025-07-01
Attending: OBSTETRICS & GYNECOLOGY
Payer: COMMERCIAL

## 2025-07-01 VITALS
DIASTOLIC BLOOD PRESSURE: 74 MMHG | WEIGHT: 160.5 LBS | SYSTOLIC BLOOD PRESSURE: 121 MMHG | HEART RATE: 72 BPM | BODY MASS INDEX: 28.43 KG/M2

## 2025-07-01 DIAGNOSIS — Z3A.24 24 WEEKS GESTATION OF PREGNANCY: ICD-10-CM

## 2025-07-01 DIAGNOSIS — O30.042 DICHORIONIC DIAMNIOTIC TWIN PREGNANCY IN SECOND TRIMESTER: ICD-10-CM

## 2025-07-01 DIAGNOSIS — O42.919 PRETERM PREMATURE RUPTURE OF MEMBRANES (PPROM) WITH UNKNOWN ONSET OF LABOR: Primary | ICD-10-CM

## 2025-07-01 PROCEDURE — 76815 OB US LIMITED FETUS(S): CPT | Performed by: OBSTETRICS & GYNECOLOGY

## 2025-07-02 ENCOUNTER — HOSPITAL ENCOUNTER (INPATIENT)
Facility: MEDICAL CENTER | Age: 25
End: 2025-07-02
Attending: OBSTETRICS & GYNECOLOGY | Admitting: STUDENT IN AN ORGANIZED HEALTH CARE EDUCATION/TRAINING PROGRAM
Payer: COMMERCIAL

## 2025-07-02 PROBLEM — O42.912 PREMATURE RUPTURE OF MEMBRANES IN SECOND TRIMESTER: Status: ACTIVE | Noted: 2025-07-02

## 2025-07-02 SDOH — ECONOMIC STABILITY: TRANSPORTATION INSECURITY
IN THE PAST 12 MONTHS, HAS THE LACK OF TRANSPORTATION KEPT YOU FROM MEDICAL APPOINTMENTS OR FROM GETTING MEDICATIONS?: NO

## 2025-07-02 SDOH — ECONOMIC STABILITY: TRANSPORTATION INSECURITY
IN THE PAST 12 MONTHS, HAS LACK OF RELIABLE TRANSPORTATION KEPT YOU FROM MEDICAL APPOINTMENTS, MEETINGS, WORK OR FROM GETTING THINGS NEEDED FOR DAILY LIVING?: NO

## 2025-07-02 ASSESSMENT — PAIN SCALES - GENERAL: PAINLEVEL: 0 - NO PAIN

## 2025-07-02 ASSESSMENT — SOCIAL DETERMINANTS OF HEALTH (SDOH)

## 2025-07-02 ASSESSMENT — PATIENT HEALTH QUESTIONNAIRE - PHQ9
SUM OF ALL RESPONSES TO PHQ9 QUESTIONS 1 AND 2: 0
2. FEELING DOWN, DEPRESSED, IRRITABLE, OR HOPELESS: NOT AT ALL
1. LITTLE INTEREST OR PLEASURE IN DOING THINGS: NOT AT ALL

## 2025-07-02 ASSESSMENT — PAIN DESCRIPTION - PAIN TYPE: TYPE: ACUTE PAIN

## 2025-07-02 NOTE — PROGRESS NOTES
1304 25 y/o  EDC 10/19/25, EGA 24.3, here to L&D for direct antepartum admission for PPROM. EFM/TOCO applied, pt states positive FM. Denies vaginal bleeding, + for LOF. Admit profile complete, new orders received from Dr. You

## 2025-07-02 NOTE — H&P
Obstetrics Admission History and Physical      History of Present Illness  Patient is a 24 y.o.  at 24w3d who is a direct admit from clinic for , prelabor rupture of membranes of baby A in a did-di, monozygotic twin pregnancy. She is being admitted following the recommendation from her MFM after her ultrasound scan today was consistent with Oligohydramnios of baby A and the patient thinks she had LOF at 13 weeks. She was seen in consultation at [facility] for further evaluation of anhydramnios since 14 weeks and she was kept overnight. Tests for ROM were equivocal but she continues to leak fluid and has had visualized pooling on sterile spec exam. Their conclusion was that she was ruptured especially given the pulm hypoplasia I the setting of normal fetal urinary bladder and renal eval.  She is not presenting with symptoms of  labor or infection.     Pregnancy dated by: LMP    She denies abdominal pain, fevers, chills, dysuria, abnormal vaginal discharge, cramping, contractions, VB. She feels fetal movement.     Pregnancy complications/antepartum admissions:   Patient Active Problem List   Diagnosis    Encounter for surveillance of implantable subdermal contraceptive    Anxiety    Current moderate episode of major depressive disorder (HCC)    Abnormal WBC count    Nausea    Routine screening for STI (sexually transmitted infection)    Hair loss    Environmental and seasonal allergies    Lymph node enlargement    Gastroesophageal reflux disease without esophagitis    Muscle spasm    Tension headache    Dyspepsia    Other chest pain    Shortness of breath    Bradycardia    Premature rupture of membranes in second trimester          OB History    Para Term  AB Living   1        SAB IAB Ectopic Molar Multiple Live Births              # Outcome Date GA Lbr Samir/2nd Weight Sex Type Anes PTL Lv   1 Current                Past Medical History:   Diagnosis Date    Abdominal discomfort 2022     Anxiety     Bacterial vaginosis 2022    Depression     Head ache     Heart murmur     as a baby    High risk heterosexual behavior 2021    Ingrown toenail 10/6/2020    Irregular periods 2020    Menarche 2014    Nausea 10/6/2020    Swollen lymph nodes 2022    Vaginal discharge 10/28/2020    Weight loss 2020       No current facility-administered medications on file prior to encounter.     Current Outpatient Medications on File Prior to Encounter   Medication Sig Dispense Refill    Prenatal Vit-Fe Fumarate-FA (PRENATAL VITAMIN PO) Take  by mouth.      aspirin 81 MG EC tablet Take 81 mg by mouth every day.      albuterol 108 (90 Base) MCG/ACT Aero Soln inhalation aerosol Inhale 2 Puffs every four hours as needed for Shortness of Breath. 18 Each 0       No Known Allergies    Family History   Problem Relation Age of Onset    Other Mother         migraine headaches    Migraines Mother     Diabetes Maternal Grandfather     No Known Problems Brother     Psychiatric Illness Maternal Aunt     Cancer Maternal Grandmother     Thyroid Maternal Grandmother     No Known Problems Brother        No past surgical history on file.    Social History  Social History     Tobacco Use    Smoking status: Former     Current packs/day: 0.00     Average packs/day: 1 pack/day for 2.0 years (2.0 ttl pk-yrs)     Types: Cigarettes     Start date:      Quit date: 2020     Years since quittin.5    Smokeless tobacco: Never    Tobacco comments:     [facility]   Vaping Use    Vaping status: Former    Start date: 2017    Substances: Nicotine, THC, Flavoring, QUIT    Devices: Disposable   Substance Use Topics    Alcohol use: Not Currently     Alcohol/week: 0.6 oz     Types: 1 Cans of beer per week    Drug use: Not Currently     Frequency: 5.0 times per week     Types: Marijuana, Inhaled     Comment: vape and smoke       Review of Systems   General: Fever: Negative  HEENT: Sore Throat: Negative  CV: Chest Pain:  Negative  Repiratory: Shortness of Breath: Negative  GI: Abdominal pain: Negative  : Dysuria: Negative    Physical Examination  Vitals:    07/02/25 1316   BP: 132/63   Pulse: 76   Resp: 15   Temp: 36.2 °C (97.2 °F)   SpO2: 96%     Appearance/Psychiatric: She does not appear anxious.  Constitutional: The patient is well nourished.  Neck: Neck appears symmetric.  Cardiovascular: No peripheral edema.  Respiratory: Respirations unlabored  Gastrointestinal: Soft, non-tender, gravid.  Extremeties: Legs are symmetric and without tenderness.   Skin: No rash observed.    FHT:  A) , mod caesar, intermittent variable decels, accels present  B) , mod caesar, pos accels, no decels,   No contractions     Labs:  O positive   Latest Reference Range & Units 02/05/25 14:06 02/05/25 14:10   Hep B Surface Antibody Quant 0.00 - 10.00 mIU/mL  5.05   Hepatitis B Surface Antigen Non-Reactive   Non-Reactive   Hepatitis B Core Ab, Total Non-Reactive   NonReactive   Hepatitis C Antibody Non-Reactive   Non-Reactive   HIV Ag/Ab Combo Assay Non Reactive   Non-Reactive   Syphilis, Treponemal Qual Non-Reactive   Non-Reactive   APTIMA Media Type  Urine (C)    C. trachomatis by PCR Negative  Negative    N. gonorrhoeae by PCR Negative  Negative    T. vaginalis by TMA Negative  Negative    (C): Corrected  Imaging in this pregnancy:   Date: 5/20/25   Findings: (18+2) Limited US   A: Ant placenta, 3VC, OLIGOHYDRAMNIOS, MVP 1.0 cm, FHR 141bpm, UA dopps S/D=2.77, + EDF, MCA 1.43MoM (higher range of nml)   B: Post placenta, 3VC, DOLLY nml, MVP 2.5cm, FHR 166bpm, UA dopps S/D = 3.37, + EFD, MCA 1.0MoM.      Date: 5/8/25   Findings: (16+4)   A: Ant placenta, 3VC, Anhydramnios, PS 1.19MoM.   B: (no US done for B)     Date: 5/5/25   Findings: (16+1) monozygotic (CFDNA)  A: Zak 15+6, S=D. EFW 142g. Post placenta, 3VC, DOLLY 0.9cm severe oligo.  Suboptimal: lat vents, heart, U&LE. UA dopps, + EDF, S/D=3.64. MCA- elevated 1.50MoM  B: Zak 15+5, S=D. EFW  136g. Ant placenta, 3VC, DOLLY nml, MVP 2.6cm. States: UE and LE not adequately visualized per ?oligo ( but nml fluid?) UA nml, +EDF, MCA abnml, elevated 1.56MoM.   4.2% % Discordance     Date: 4/14/25   Findings: (13+1) Membrane seen and separate placentas.   A: CRL 55.9=12+1. Ant placenta, 3VC, ANHYDRAM, renal abnorm cant be excluded-subopt vies. Renal arter appr abnl w L displaced inferiorly (and englrgd) R apprs to have double renal artery system.   B: CRL 67.9mm=13+1. Post placenta, 3VC, DOLLY NML. Could not see kidneys.      Genetic Testing it This Pregnancy:  - Screening: NT 1.3  - Cell free DNA: 3x neg    MFM on 7/1/25  GA by prior [id number] w + 2 d  GREGORIO by prior assessment:10/19/2025  Assigned:based on stated GREGORIO, selected on 04/14/2025  Assigned GA24 w + 2 d  Assigned GREGORIO:10/19/2025  General Evaluation  ===================     Cardiac activity present.  bpm. Fetal movements: visualized. Presentation: breech; maternal left inferior  Placenta: anterior  Umbilical cord: 3 vessel cord  Amniotic fluid: oligohydramnios, MVP 1.3 cm  General Evaluation  ===================     Cardiac activity present.  bpm. Fetal movements: visualized. Presentation: variable; maternal right superior  Placenta: posterior  Umbilical cord: 3 vessel cord  Amniotic fluid: normal amount, MVP 3.9 cm  Recommendations  ================     Due to suspected PPROM, pt advised to be admitted to L&D, she agreed to come in tomorrow.  IMPRESSION:  Impression  ===========     Monozygotic dichorionic/diamniotic twins.  Twin A is presenting with oligohydramnios.  Twin B is right/fundal, normal MVP.        Exam Ended: 07/01/25  2:35 PM     GBS UNK    Assessment/Plan:   Patient Active Problem List    Diagnosis Date Noted    Premature rupture of membranes in second trimester 07/02/2025    Other chest pain 10/03/2024    Shortness of breath 10/03/2024    Bradycardia 10/03/2024    Dyspepsia 12/14/2023    Muscle spasm 10/30/2023    Tension  headache 10/30/2023    Gastroesophageal reflux disease without esophagitis 2022    Lymph node enlargement 2022    Environmental and seasonal allergies 2021    Hair loss 2020    Nausea 10/06/2020    Routine screening for STI (sexually transmitted infection) 10/06/2020    Abnormal WBC count 2020    Anxiety 2020    Current moderate episode of major depressive disorder (HCC) 2020    Encounter for surveillance of implantable subdermal contraceptive 07/10/2017     , prelabor rupture of membranes  Di, di (monozygotic) twin pregnancy  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     24 y.o.  at 24w3d here for PPROM of baby A in monozygotic, di-di twin pregnancy.  Stable. No signs of  labor or infection or abruption at this time.  GBS UNK. Possible pulmonary hypoplasia based on low thoracic circumference. Baby A is breech. FHT appropriate for gestational age.     -admit to LND  -CEFM  -plan for steroids and latency antibiotics  -GBS to be collected  -[facility] to see patient daily  -Magnesium for FNP if concern for delivery  -NICU and  consults needed, orders placed  -If she is still pregnant around 26-28 weeks will need mid-trimester labs  -SVE for maternal and fetal indications  -delivery timing pending antepartum course and recs from [facility] but would be ideal to get her to 34 weeks or greater    [place] You DO, FACOG  [facility]

## 2025-07-02 NOTE — CARE PLAN
Problem: Knowledge Deficit - L&D  Goal: Patient and family/caregivers will demonstrate understanding of plan of care, disease process/condition, diagnostic tests and medications  Outcome: Progressing  Note: Plan of care reviewed including labs, fetal monitoring, tests, antibiotics, other meds, betamethasone, length of stay, NICU consult and visitor policy. All questions answered at this time     Problem: Risk for Injury  Goal: Patient and fetus will be free of preventable injury/complications  Outcome: Progressing  Flowsheets (Taken 7/2/2025 1500)  Resting Tone Palpated: Soft  Mode: External [place]  Contraction Frequency: none  Note: Continuous fetal monitoring in place per order   The patient is Stable - Low risk of patient condition declining or worsening    Shift Goals  Clinical Goals: Monitor for S&S of infection  Patient Goals: Healthy babies  Family Goals: Support    Progress made toward(s) clinical / shift goals:  Progressing    Patient is not progressing towards the following goals: N/A

## 2025-07-03 ASSESSMENT — PAIN DESCRIPTION - PAIN TYPE: TYPE: ACUTE PAIN

## 2025-07-03 NOTE — PROGRESS NOTES
1900 - Report received, care assumed. Patient with family at bedside. Denies questions or needs. Call light in reach, encouraged to call out as needed     1930 - RN to bedside. Assessment completed, see flowsheets for details. Patient reports intermittent continued LOF that is clear and odorless, denies VB, denies CTX. Affirms + FM x2. POC discussed, all questions answered. Patient encouraged to call out with needs/questions/changes.     Patient up to restroom after assessment. EFM adjusted multiple times. Baby B with audible fetal movement noted by RN x2 and pt. Difficulty obtain continuous tracing on baby B after multiple attempts.  updated. MD contacted [facility] to clarify monitoring order. Order changed, see order hx for details.     Report given, care relinquished

## 2025-07-03 NOTE — CARE PLAN
The patient is Watcher - Medium risk of patient condition declining or worsening    Shift Goals  Clinical Goals: Remain free of signs/symptoms of infection or labor  Patient Goals: Healthy mom, healthy babies  Family Goals: Support    Progress made toward(s) clinical / shift goals:    Problem: Psychosocial - L&D  Goal: Patient will be able to discuss coping skills during hospitalization  Outcome: Progressing  Note: Assess and identify positive behaviors, emotional response and participation during hospitalization.      Problem: Risk for Injury  Goal: Patient and fetus will be free of preventable injury/complications  Outcome: Progressing  Note: Monitor uterine activity, labor s/s and fetal well-being x2

## 2025-07-03 NOTE — PROGRESS NOTES
Name: Brandie Hunter  Medical Record Number:  [id number]  YOB: 2000  Date of Service: 7/3/2025      Patient is a 24 y.o.  at 24w3d who was admitted yesterday from clinic for , prelabor rupture of membranes of baby A in a did-di, monozygotic twin pregnancy. She was admitted following the recommendation from her MFM after her ultrasound scan today was consistent with oligohydramnios of baby A and the patient thinks she had LOF at 13 weeks. She was seen in consultation at [facility] for further evaluation of anhydramnios since 14 weeks and she was kept overnight. Tests for ROM were equivocal but she continues to leak fluid and has had visualized pooling on sterile spec exam. Their conclusion was that she was ruptured especially given the pulm hypoplasia I the setting of normal fetal urinary bladder and renal eval.  She is not presenting with symptoms of  labor or infection.     Please see the active problem list for a full description of the issues for which the patient was evaluated for today.      Today she has no complaints and has received the first dose of a course of steroids.        Physical Examination: General appearance - alert, well appearing, and in no distress    The patient's past medical history and prenatal records were reviewed.  Additional issues addressed and updated today:  Problem List[1]  Family History   Problem Relation Age of Onset    Other Mother         migraine headaches    Migraines Mother     Diabetes Maternal Grandfather     No Known Problems Brother     Psychiatric Illness Maternal Aunt     Cancer Maternal Grandmother     Thyroid Maternal Grandmother     No Known Problems Brother      Social History[2]    The patient is stable and has been well informed as to the risk to Twin A related to prolonged an hydramnios. She knows that survival is unlikely due to pulmonary hypoplasia. In the event of evidence of fetal distress in Twin A, she and her partner would  not like to intervene unless there is also risk to Twin B(who appears to be doing fine at this time.  We will continue in house management for her at this time due to the PROM and risk of labor.    Justus Martin Jr., M.D.         [1]   Patient Active Problem List  Diagnosis    Encounter for surveillance of implantable subdermal contraceptive    Anxiety    Current moderate episode of major depressive disorder (HCC)    Abnormal WBC count    Nausea    Routine screening for STI (sexually transmitted infection)    Hair loss    Environmental and seasonal allergies    Lymph node enlargement    Gastroesophageal reflux disease without esophagitis    Muscle spasm    Tension headache    Dyspepsia    Other chest pain    Shortness of breath    Bradycardia    Premature rupture of membranes in second trimester   [2]   Social History  Socioeconomic History    Marital status: Single    Number of children: 0    Highest education level: Some college, no degree   Occupational History    Occupation: /     Employer: [employer]   Tobacco Use    Smoking status: Former     Current packs/day: 0.00     Average packs/day: 1 pack/day for 2.0 years (2.0 ttl pk-yrs)     Types: Cigarettes     Start date:      Quit date:      Years since quittin.5    Smokeless tobacco: Never    Tobacco comments:     [facility]   Vaping Use    Vaping status: Former    Start date: 2017    Substances: Nicotine, THC, Flavoring, QUIT    Devices: Disposable   Substance and Sexual Activity    Alcohol use: Not Currently     Alcohol/week: 0.6 oz     Types: 1 Cans of beer per week    Drug use: Not Currently     Frequency: 5.0 times per week     Types: Marijuana, Inhaled     Comment: vape and smoke    Sexual activity: Not Currently     Partners: Male   Other Topics Concern    Sleep Concern Yes    Exercise Yes    Seat Belt Yes     Social Drivers of Health     Food Insecurity: No Food Insecurity (2025)    Hunger Vital Sign     Worried About  Running Out of Food in the Last Year: Never true     Ran Out of Food in the Last Year: Never true   Transportation Needs: No Transportation Needs (7/2/2025)    PRAPARE - Transportation     Lack of Transportation (Medical): No     Lack of Transportation (Non-Medical): No   Intimate Partner Violence: Not At Risk (7/2/2025)    Humiliation, Afraid, Rape, and Kick questionnaire     Fear of Current or Ex-Partner: No     Emotionally Abused: No     Physically Abused: No     Sexually Abused: No   Housing Stability: Low Risk  (7/2/2025)    Housing Stability Vital Sign     Unable to Pay for Housing in the Last Year: No     Number of Times Moved in the Last Year: 0     Homeless in the Last Year: No

## 2025-07-03 NOTE — DISCHARGE PLANNING
Discharge Planning Assessment Post Partum    Reason for Referral: 23yo  at 24+3weeks pregnancy with  rupture of membranes admitted to the hospital until delivery   Address: [address] [place] [id number]  Phone: [phone]  Type of Living Situation: stable housing   Mom Diagnosis: Twin pregnancy-24.3 weeks  Baby Diagnosis: Baby A-pulmonary hypoplasia with low chance of survival  Primary Language: English     Name of Baby: Plans to name Baby A-Hardy and Baby B-Babatunde  Father of the Baby: Gunner Rojas   Involved in baby’s care? Yes  Contact Information: [phone]    Prenatal Care: Yes, [facility] starting at 13 weeks   Mom's PCP: Avani Morris, PAC   PCP for new baby: Pediatrician list provided to mother     Support System: ASHLEY and mother-Dominique Hunter   Coping/Bonding between mother & baby: Yes    Mom's Insurance: [employer]   Mother Employed/School: [employer]  Other children in the home/names & ages: first babies     Financial Hardship/Income: No   Mom's Mental status: alert and oriented   Services used prior to admit: No    CPS History: No  Psychiatric History: History of anxiety and depression.  MOB denies any current symptoms and is not taking medication.    Domestic Violence History: No  Drug/ETOH History: No    Resources Provided: pediatrician list, children and family community resource list, diaper bank referrals, PPD/PPA handout, diaper bank referrals, and pamphlet to [facility]   Referrals Made: Discussed [facility] and stated a referral can be made if baby is in the NICU     Ongoing Plan: Resources provided to Pt.  Continue to follow and assist as needed.

## 2025-07-03 NOTE — CARE PLAN
The patient is Stable - Low risk of patient condition declining or worsening    Shift Goals  Clinical Goals: patient has no contractions  Patient Goals: stay pregnant  Family Goals: support    Problem: Knowledge Deficit - L&D  Goal: Patient and family/caregivers will demonstrate understanding of plan of care, disease process/condition, diagnostic tests and medications  Outcome: Progressing     Problem: Risk for Excess Fluid Volume  Goal: Patient will demonstrate pulse, blood pressure and neurologic signs within expected ranges and without any respiratory complications  Outcome: Progressing     Problem: Psychosocial - L&D  Goal: Patient's level of anxiety will decrease  Outcome: Progressing     Problem: Pain  Goal: Patient's pain will be alleviated or reduced to the patient’s comfort goal  Outcome: Progressing     Problem: Risk for Infection and Impaired Wound Healing  Goal: Patient will remain free from infection  Outcome: Progressing     Progress made toward(s) clinical / shift goals:  patient denies any S&S of infections or fevers. Patient has no abdomen pain. Denies any cramping or contractions.     Patient is not progressing towards the following goals:NA

## 2025-07-04 ENCOUNTER — APPOINTMENT (OUTPATIENT)
Dept: RADIOLOGY | Facility: MEDICAL CENTER | Age: 25
End: 2025-07-04
Attending: OBSTETRICS & GYNECOLOGY
Payer: COMMERCIAL

## 2025-07-04 NOTE — PROCEDURES
"VAT RN x 2 at bedside to discuss PICC/Midline/PIV placement, education provided regarding all types of vasculature access, benefits versus risks discussed. Ultimately patient decided she would like to just have PIV's placed for the time being. An 18g - 1.75\" placed in right forearm by VAT at this time.    Peripheral vasculature assessed and is really good throughout forearms, helping patient and VAT team feel confident for peripheral placement.    It was also discussed that patient would have options going forward for placement of PICC/Midline if patient changed mind and wanted picc or mid placed. VAT team to be consulted for all peripheral access. Please contact VAT team for all pokes for this patient if necessary      "

## 2025-07-04 NOTE — PROGRESS NOTES
0700 Report received from Riddhi JONES RN at bedside and assumed care. POC discussed with patient and encouraged to state needs or questions at any time.    0906 Dr. Martin updated on IV access, orders received. Okay to hold off until midline is placed unless status changes (I.e. labor or non reassuring fetal status).    1018 Patient declining PICC at this time. Risks and benefits discussed, patient states understanding. Dr. Martin and Dr. Barcenas updated, will place new PIV.    1900 Report given to Akanksha FORREST RN, care relinquished.

## 2025-07-04 NOTE — PROGRESS NOTES
0700- report received from Bernadine URBAN. Plan of care discussed.     0800- assessment done. Patient denies any contractions or bleeding. States she occasionally has leaking of fluid. Patient has no abdomen tenderness. States + FM x2.     1630- report given to Sj URBAN

## 2025-07-04 NOTE — PROGRESS NOTES
Name: Brandie Hunter  Medical Record Number:  [id number]  YOB: 2000  Date of Service: 2025  The patient is a 24 y.o. , with an GREGORIO of 10/19/2025, by Other Basis dating method.  Please see the active problem list for a full description of the issues for which the patient was evaluated for today.      She was seen today for risk of  delivery. No complaints this am.    Physical Examination: General appearance - alert, well appearing, and in no distress    The patient's past medical history and prenatal records were reviewed.  Additional issues addressed and updated today:  Problem List[1]  Family History   Problem Relation Age of Onset    Other Mother         migraine headaches    Migraines Mother     Diabetes Maternal Grandfather     No Known Problems Brother     Psychiatric Illness Maternal Aunt     Cancer Maternal Grandmother     Thyroid Maternal Grandmother     No Known Problems Brother      Social History[2]    The patient is doing well with no signs of labor or infection.    Justus Martin Jr., M.D.         [1]   Patient Active Problem List  Diagnosis    Encounter for surveillance of implantable subdermal contraceptive    Anxiety    Current moderate episode of major depressive disorder (HCC)    Abnormal WBC count    Nausea    Routine screening for STI (sexually transmitted infection)    Hair loss    Environmental and seasonal allergies    Lymph node enlargement    Gastroesophageal reflux disease without esophagitis    Muscle spasm    Tension headache    Dyspepsia    Other chest pain    Shortness of breath    Bradycardia    Premature rupture of membranes in second trimester   [2]   Social History  Socioeconomic History    Marital status: Single    Number of children: 0    Highest education level: Some college, no degree   Occupational History    Occupation: /     Employer: [employer]   Tobacco Use    Smoking status: Former     Current packs/day: 0.00     Average  packs/day: 1 pack/day for 2.0 years (2.0 ttl pk-yrs)     Types: Cigarettes     Start date:      Quit date: 2020     Years since quittin.5    Smokeless tobacco: Never    Tobacco comments:     va-pe   Vaping Use    Vaping status: Former    Start date: 2017    Substances: Nicotine, THC, Flavoring, QUIT    Devices: Disposable   Substance and Sexual Activity    Alcohol use: Not Currently     Alcohol/week: 0.6 oz     Types: 1 Cans of beer per week    Drug use: Not Currently     Frequency: 5.0 times per week     Types: Marijuana, Inhaled     Comment: vape and smoke    Sexual activity: Not Currently     Partners: Male   Other Topics Concern    Sleep Concern Yes    Exercise Yes    Seat Belt Yes     Social Drivers of Health     Food Insecurity: No Food Insecurity (2025)    Hunger Vital Sign     Worried About Running Out of Food in the Last Year: Never true     Ran Out of Food in the Last Year: Never true   Transportation Needs: No Transportation Needs (2025)    PRAPARE - Transportation     Lack of Transportation (Medical): No     Lack of Transportation (Non-Medical): No   Intimate Partner Violence: Not At Risk (2025)    Humiliation, Afraid, Rape, and Kick questionnaire     Fear of Current or Ex-Partner: No     Emotionally Abused: No     Physically Abused: No     Sexually Abused: No   Housing Stability: Low Risk  (2025)    Housing Stability Vital Sign     Unable to Pay for Housing in the Last Year: No     Number of Times Moved in the Last Year: 0     Homeless in the Last Year: No

## 2025-07-04 NOTE — CONSULTS
Asked to speak to this nice young lady about possible delivery @ 24-4/7 weeks  Mother is 24 y.o.  with Di-Di Twins. There is PPROM in the A Twin. 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 It was explained to her that do to this fact the A baby may not be viable.    Mother was told about possible RDS, which could require anything from O2 support all the way to needing mechanical ventilation. She was told about possible CLD which could cause prolonged need for ventilatory support and long term wheezing similar to asthma   She was told about possible PDA which could be treated medically or surgically  She was told about IVH, and that even without IVH, extremely premature babies are at risk for developmental problems such as MRCP, etc  She was told about possible ROP which could require laser ablation.  She was told to expect the babies to be in the NICU until her GREGORIO, but this could vary depending on the clinical conditions    If She has any other questions please let us know    Raghu Armstrong MD  Staff Neonatologist   [facility]

## 2025-07-04 NOTE — CARE PLAN
The patient is Stable - Low risk of patient condition declining or worsening    Shift Goals  Clinical Goals: VS stable, fetal monitoring  Patient Goals: rest, stay pregnant  Family Goals: support    Progress made toward(s) clinical / shift goals:    Problem: Knowledge Deficit - L&D  Goal: Patient and family/caregivers will demonstrate understanding of plan of care, disease process/condition, diagnostic tests and medications  Outcome: Progressing  Note: Oriented patient to unit, discussed bed controls and call light. Educated patient on labor and delivery process. Discussed POC with patient and support persons, patient agrees to and verbalized understanding of POC. All questions answered at this time.      Problem: Risk for Infection and Impaired Wound Healing  Goal: Patient will remain free from infection  Outcome: Progressing  Note: Patient remains afebrile at this time, no s/s of infection at this time.       Patient is not progressing towards the following goals:

## 2025-07-04 NOTE — CARE PLAN
The patient is Stable - Low risk of patient condition declining or worsening    Shift Goals  Clinical Goals: monitor  Patient Goals: rest, stay pregnant  Family Goals: support    Progress made toward(s) clinical / shift goals:    Problem: Knowledge Deficit - L&D  Goal: Patient and family/caregivers will demonstrate understanding of plan of care, disease process/condition, diagnostic tests and medications  Outcome: Progressing     Problem: Risk for Excess Fluid Volume  Goal: Patient will demonstrate pulse, blood pressure and neurologic signs within expected ranges and without any respiratory complications  Outcome: Progressing     Problem: Psychosocial - L&D  Goal: Patient's level of anxiety will decrease  Outcome: Progressing  Goal: Patient will be able to discuss coping skills during hospitalization  Outcome: Progressing  Goal: Patient's ability to re-evaluate and adapt role responsibilities will improve  Outcome: Progressing  Goal: Spiritual and cultural needs incorporated into hospitalization  Outcome: Progressing     Problem: Cardiac Output  Goal: Patient will remain normotensive throughout hospitalization  Outcome: Progressing     Problem: Pain  Goal: Patient's pain will be alleviated or reduced to the patient’s comfort goal  Outcome: Progressing     Problem: Risk for Fluid Imbalance  Goal: Patient's fluid volume balance will be maintained or improve  Outcome: Progressing     Problem: Risk for Infection and Impaired Wound Healing  Goal: Patient will remain free from infection  Outcome: Progressing  Note: Patient is afebrile and free from s/s of infection at this time. Will continue to assess.  Goal: Patient's wound/surgical incision will decrease in size and heals properly  Outcome: Progressing     Problem: Risk for Injury  Goal: Patient and fetus will be free of preventable injury/complications  Outcome: Progressing  Note: Fetal monitoring performed as ordered and encouraged pt to call out with any needs,  questions, or concerns as soon as they arise. Will continue to assess maternal and fetal status.     Problem: Risk for Venous Thromboembolism (VTE)  Goal: VTE prevention measures will be implemented and patient will remain free from VTE  Outcome: Progressing     Problem: Discharge Barriers/Planning  Goal: Patient's continuum of care needs are met  Outcome: Progressing       Patient is not progressing towards the following goals:

## 2025-07-04 NOTE — PROGRESS NOTES
1900: Report received from Sj URBAN.    2030: Discussed POC. Patient resting in bed, patient agrees to and verbalized understanding of POC. Patient denies VB, denies contractions, denies HA and vision changes. Patient states she has + FM. Assessment completed, all questions answered at this time.     0700: Report given to Anabella URBAN, discussed POC, relinquished care of patient at this time.

## 2025-07-05 ASSESSMENT — PATIENT HEALTH QUESTIONNAIRE - PHQ9
2. FEELING DOWN, DEPRESSED, IRRITABLE, OR HOPELESS: NOT AT ALL
SUM OF ALL RESPONSES TO PHQ9 QUESTIONS 1 AND 2: 0
1. LITTLE INTEREST OR PLEASURE IN DOING THINGS: NOT AT ALL

## 2025-07-05 ASSESSMENT — PAIN DESCRIPTION - PAIN TYPE
TYPE: ACUTE PAIN
TYPE: ACUTE PAIN

## 2025-07-05 NOTE — PROGRESS NOTES
1900 - Report received from RAJNI Kyle. POC discussed.    1915 - Per Dr. Barcenas, sofia to do Qshift vitals for NOC shift and Q4 vitals for dayshift unless clinical status changes.    0700 - Report given to RAJNI Zuniga. POC discussed.

## 2025-07-05 NOTE — CARE PLAN
The patient is Stable - Low risk of patient condition declining or worsening    Shift Goals  Clinical Goals: monitor  Patient Goals: rest, stay pregnant  Family Goals: support    Progress made toward(s) clinical / shift goals:    Problem: Knowledge Deficit - L&D  Goal: Patient and family/caregivers will demonstrate understanding of plan of care, disease process/condition, diagnostic tests and medications  Outcome: Progressing   Pt continually updated and educated on ongoing POC   Problem: Pain  Goal: Patient's pain will be alleviated or reduced to the patient’s comfort goal  Outcome: Progressing   Pt states she is experiencing adequate pain control

## 2025-07-05 NOTE — CARE PLAN
The patient is Watcher - Medium risk of patient condition declining or worsening    Shift Goals  Clinical Goals: Monitor s/s of infection and fetal well-being  Patient Goals: rest, stay pregnant  Family Goals: support      Problem: Knowledge Deficit - L&D  Goal: Patient and family/caregivers will demonstrate understanding of plan of care, disease process/condition, diagnostic tests and medications  Outcome: Progressing   POC discussed with pt. Pt verbalizes understanding and asks questions as needed.  Problem: Risk for Infection and Impaired Wound Healing  Goal: Patient will remain free from infection  Outcome: Progressing   Temperature checks as ordered. Assessed for s/s of infection. Monitor and reviewed lab results.  Assess for FHR changes associated with infection and distress d/t PPROM.    Problem: Risk for Injury  Goal: Patient and fetus will be free of preventable injury/complications  Outcome: Progressing  Routine monitoring of FHR and uterine activity. This RN will continue to assess pt for s/s of labor. Pt head to toe complete as baseline for any changes in pt condition.

## 2025-07-05 NOTE — PROGRESS NOTES
0700 - Report received from RAJNI Vale. POC discussed. Care assumed.  0798 - Dr. Martin at  to assess pt and discuss POC.  7830 - Pt reports +FM and LOF/clear. Pt denies VB/ UC/ pain. EFM/toco in place to monitor fetal well-being. Pt has no new questions or concerns at this time.  1319 - Pt family at . Consult call completed with Dr. Martin to update on POC. All pt and family questions/concerns addressed at this time.  1500 - Dr. Martin reviewed FHT. No new orders received.    1625 - Report given to RAJNI Lai. Care relinquished.

## 2025-07-06 ASSESSMENT — PATIENT HEALTH QUESTIONNAIRE - PHQ9
1. LITTLE INTEREST OR PLEASURE IN DOING THINGS: NOT AT ALL
2. FEELING DOWN, DEPRESSED, IRRITABLE, OR HOPELESS: NOT AT ALL
SUM OF ALL RESPONSES TO PHQ9 QUESTIONS 1 AND 2: 0

## 2025-07-06 NOTE — PROGRESS NOTES
0700 Report received from Norma BARNEY RN     0800 Pt denies VB and Ctx, Pt reports LOF that's clear and odor free and verbalizes understanding to report any changes. Pt reports +FM

## 2025-07-06 NOTE — PROGRESS NOTES
0430: Report received from Obed URBAN. Care assumed.   0700: Report given to Judy URBAN. Care relinquished.

## 2025-07-06 NOTE — PROGRESS NOTES
2020: RN at  for assessment, pt denies cramping,VB, Uc's, reports LOF clear. + FM, EFM and [place] applied    0430: report given to RAJNI Green. Care relinquished

## 2025-07-06 NOTE — PROGRESS NOTES
Break Nurse Note:    2305 - Report received from RAJNI Clay. POC discussed.     2335 - Care relinquished.

## 2025-07-06 NOTE — CARE PLAN
Problem: Knowledge Deficit - L&D  Goal: Patient and family/caregivers will demonstrate understanding of plan of care, disease process/condition, diagnostic tests and medications  Outcome: Progressing  Note: Ongoing education      Problem: Risk for Excess Fluid Volume  Goal: Patient will demonstrate pulse, blood pressure and neurologic signs within expected ranges and without any respiratory complications  Outcome: Progressing     Problem: Psychosocial - L&D  Goal: Patient's level of anxiety will decrease  Outcome: Progressing  Goal: Patient will be able to discuss coping skills during hospitalization  Outcome: Progressing  Note: Great support from staff   Goal: Patient's ability to re-evaluate and adapt role responsibilities will improve  Outcome: Progressing  Goal: Spiritual and cultural needs incorporated into hospitalization  Outcome: Progressing     Problem: Cardiac Output  Goal: Patient will remain normotensive throughout hospitalization  Outcome: Progressing     Problem: Pain  Goal: Patient's pain will be alleviated or reduced to the patient’s comfort goal  Outcome: Progressing     Problem: Risk for Fluid Imbalance  Goal: Patient's fluid volume balance will be maintained or improve  Outcome: Progressing     Problem: Risk for Infection and Impaired Wound Healing  Goal: Patient will remain free from infection  Outcome: Progressing  Note: Routine vital signs and temps to detect infection. Pt aware of s/s of fever and infection. Pt to notify RN if changes occur with amniotic fluid.   Goal: Patient's wound/surgical incision will decrease in size and heals properly  Outcome: Progressing     Problem: Risk for Injury  Goal: Patient and fetus will be free of preventable injury/complications  Outcome: Progressing  Note: Call light in reach, Pt aware of cords and tripping hazards      Problem: Risk for Venous Thromboembolism (VTE)  Goal: VTE prevention measures will be implemented and patient will remain free from  VTE  Outcome: Progressing     Problem: Discharge Barriers/Planning  Goal: Patient's continuum of care needs are met  Outcome: Progressing   The patient is Stable - Low risk of patient condition declining or worsening    Shift Goals  Clinical Goals: Healthy mom, Healthy Babies  Patient Goals: Rest  Family Goals: Support    Progress made toward(s) clinical / shift goals:  Progressing

## 2025-07-06 NOTE — PROGRESS NOTES
Name: Brandie Hunter  Medical Record Number:  [id number]  YOB: 2000  Date of Service: 2025  The patient is a 24 y.o. , with an GREGORIO of 10/19/2025, by Other Basis dating method.  Please see the active problem list for a full description of the issues for which the patient was evaluated for today.      She was seen today for Twins, , Di Di. Twin A with an hydramnios due to very early ROM. She is now status post steroid course and is on oral antibiotics for latency. No complaints today.      Physical Examination: General appearance - alert, well appearing, and in no distress  Mental status - alert, oriented to person, place, and time, normal mood, behavior, speech, dress, motor activity, and thought processes    The patient's past medical history and prenatal records were reviewed.  Additional issues addressed and updated today:  Problem List[1]  Family History   Problem Relation Age of Onset    Other Mother         migraine headaches    Migraines Mother     Diabetes Maternal Grandfather     No Known Problems Brother     Psychiatric Illness Maternal Aunt     Cancer Maternal Grandmother     Thyroid Maternal Grandmother     No Known Problems Brother      Social History[2]    The patient is doing well with no signs of labor or infection. At this time we would intervene on behalf of Twin B but not Twin A given the very poor prognosis for Twin A    Justus Martin Jr., M.D.         [1]   Patient Active Problem List  Diagnosis    Encounter for surveillance of implantable subdermal contraceptive    Anxiety    Current moderate episode of major depressive disorder (HCC)    Abnormal WBC count    Nausea    Routine screening for STI (sexually transmitted infection)    Hair loss    Environmental and seasonal allergies    Lymph node enlargement    Gastroesophageal reflux disease without esophagitis    Muscle spasm    Tension headache    Dyspepsia    Other chest pain    Shortness of breath    Bradycardia     Premature rupture of membranes in second trimester   [2]   Social History  Socioeconomic History    Marital status: Single    Number of children: 0    Highest education level: Some college, no degree   Occupational History    Occupation: /     Employer: [employer]   Tobacco Use    Smoking status: Former     Current packs/day: 0.00     Average packs/day: 1 pack/day for 2.0 years (2.0 ttl pk-yrs)     Types: Cigarettes     Start date:      Quit date:      Years since quittin.5    Smokeless tobacco: Never    Tobacco comments:     va-pe   Vaping Use    Vaping status: Former    Start date: 2017    Substances: Nicotine, THC, Flavoring, QUIT    Devices: Disposable   Substance and Sexual Activity    Alcohol use: Not Currently     Alcohol/week: 0.6 oz     Types: 1 Cans of beer per week    Drug use: Not Currently     Frequency: 5.0 times per week     Types: Marijuana, Inhaled     Comment: vape and smoke    Sexual activity: Not Currently     Partners: Male   Other Topics Concern    Sleep Concern Yes    Exercise Yes    Seat Belt Yes     Social Drivers of Health     Food Insecurity: No Food Insecurity (2025)    Hunger Vital Sign     Worried About Running Out of Food in the Last Year: Never true     Ran Out of Food in the Last Year: Never true   Transportation Needs: No Transportation Needs (2025)    PRAPARE - Transportation     Lack of Transportation (Medical): No     Lack of Transportation (Non-Medical): No   Intimate Partner Violence: Not At Risk (2025)    Humiliation, Afraid, Rape, and Kick questionnaire     Fear of Current or Ex-Partner: No     Emotionally Abused: No     Physically Abused: No     Sexually Abused: No   Housing Stability: Low Risk  (2025)    Housing Stability Vital Sign     Unable to Pay for Housing in the Last Year: No     Number of Times Moved in the Last Year: 0     Homeless in the Last Year: No

## 2025-07-06 NOTE — CARE PLAN
The patient is Watcher - Medium risk of patient condition declining or worsening    Shift Goals  Clinical Goals: no s/s infection/fetal wellbeing  Patient Goals: rest  Family Goals: support    Progress made toward(s) clinical / shift goals:    Problem: Pain  Goal: Patient's pain will be alleviated or reduced to the patient’s comfort goal  Outcome: Progressing  Note: The pt's pain will be alleviated to their comfort level using pain techniques such as heat pads, Medications,     Problem: Risk for Infection and Impaired Wound Healing  Goal: Patient will remain free from infection  Outcome: Progressing  Note: The pt remained afebrile throughout shift, no s/s infection. hand hygiene by hospital staff was maintained throughout shift before and after pt care.       Patient is not progressing towards the following goals: n/a

## 2025-07-07 ASSESSMENT — PAIN DESCRIPTION - PAIN TYPE
TYPE: ACUTE PAIN
TYPE: ACUTE PAIN

## 2025-07-07 NOTE — CARE PLAN
The patient is Watcher - Medium risk of patient condition declining or worsening    Shift Goals  Clinical Goals: Monitor babies, no s/sx of infection  Patient Goals: Stay pregnant, rest and comfort  Family Goals: Support    Progress made toward(s) clinical / shift goals:       Problem: Knowledge Deficit - L&D  Goal: Patient and family/caregivers will demonstrate understanding of plan of care, disease process/condition, diagnostic tests and medications  Outcome: Progressing     Problem: Pain  Goal: Patient's pain will be alleviated or reduced to the patient’s comfort goal  Outcome: Progressing  Note: Patient has no complaints of pain, education provided to alert RN if patient has pain.

## 2025-07-07 NOTE — PROGRESS NOTES
0700: Received report with Lindsey RN from Norma URBAN, plan of care discussed.     1245: Assumed full care from Lindsey URBAN.     1900: Report given to Karmen URBAN, plan of care discussed.

## 2025-07-07 NOTE — PROGRESS NOTES
1900: Report from Judy CHANDLER RN, care assumed.    0700: Report to Joelle URBAN and Nadia RN, care relinquished.

## 2025-07-07 NOTE — CARE PLAN
The patient is Watcher - Medium risk of patient condition declining or worsening    Shift Goals  Clinical Goals: Monitor babies, no s/sx of infection  Patient Goals: Stay pregnant, rest and comfort  Family Goals: Support      Problem: Psychosocial - L&D  Goal: Spiritual and cultural needs incorporated into hospitalization  Outcome: Progressing  Flowsheets (Taken 7/6/2025 1893)  Incorporate Spiritual/Cultural Needs:   Encouraged verbilization of feelings, concerns, expectations and needs   Encouraged support system participation     Problem: Pain  Goal: Patient's pain will be alleviated or reduced to the patient’s comfort goal  Outcome: Progressing  Note: Pt denies any pain at this time. Pt educated to call RN with new onset of pain and whenever intervention is needed.     Problem: Risk for Infection and Impaired Wound Healing  Goal: Patient will remain free from infection  Outcome: Progressing  Note: Pt is afebrile. Routine VS check in place.

## 2025-07-07 NOTE — CONSULTS
"ANTEPARTUM PROGRESS NOTE;    Brandie Hunter is a 24 y.o. female  at 25w1d.  She is admitted for observation of twin gestation with 1 affected by very early premature rupture of membrane.    Subjective:   CC: Patient has no complaints this morning  Hx: Twin gestation.  Admitted for suspicion of premature rupture of membrane early in second trimester.  RoS: denies vaginal bleeding, leakage of fluid, uterine contractions, fever chills or abdominal pain    Objective:  VS: BP (!) 142/66   Pulse 75   Temp 36.9 °C (98.5 °F) (Temporal)   Resp 16   Ht 1.575 m (5' 2\")   Wt 71.2 kg (157 lb)   LMP 2025 (Approximate)   SpO2 90%   BMI 28.72 kg/m² , Stable  PE: Alert and oriented x3. Gen.-well-developed well-nourished female in no apparent distress  HEENT-normocephalic, nontraumatic,EOMI,PERRLA  Abdomen-Soft, gravid, nontender per patient report  Extremities without cyanosis clubbing or edema  Neurologic exam deferred    Fetal Assessment: She is on NST twice a day for twin B.    Labs: Urine analysis reflects contamination.  White blood cell count was normal as of   Admission on 2025   Component Date Value Ref Range Status    WBC 2025 11.9 (H)  4.8 - 10.8 K/uL Final    RBC 2025 3.76 (L)  4.20 - 5.40 M/uL Final    Hemoglobin 2025 11.9 (L)  12.0 - 16.0 g/dL Final    Hematocrit 2025 34.2 (L)  37.0 - 47.0 % Final    MCV 2025 91.0  81.4 - 97.8 fL Final    MCH 2025 31.6  27.0 - 33.0 pg Final    MCHC 2025 34.8  32.2 - 35.5 g/dL Final    RDW 2025 44.6  35.9 - 50.0 fL Final    Platelet Count 2025 255  164 - 446 K/uL Final    MPV 2025 9.9  9.0 - 12.9 fL Final    Neutrophils-Polys 2025 75.30 (H)  44.00 - 72.00 % Final    Lymphocytes 2025 13.50 (L)  22.00 - 41.00 % Final    Monocytes 2025 7.90  0.00 - 13.40 % Final    Eosinophils 2025 1.10  0.00 - 6.90 % Final    Basophils 2025 0.50  0.00 - 1.80 % Final    Immature " Granulocytes 07/02/2025 1.70 (H)  0.00 - 0.90 % Final    Nucleated RBC 07/02/2025 0.00  0.00 - 0.20 /100 WBC Final    Neutrophils (Absolute) 07/02/2025 8.97 (H)  1.82 - 7.42 K/uL Final    Includes immature neutrophils, if present.    Lymphs (Absolute) 07/02/2025 1.61  1.00 - 4.80 K/uL Final    Monos (Absolute) 07/02/2025 0.94 (H)  0.00 - 0.85 K/uL Final    Eos (Absolute) 07/02/2025 0.13  0.00 - 0.51 K/uL Final    Baso (Absolute) 07/02/2025 0.06  0.00 - 0.12 K/uL Final    Immature Granulocytes (abs) 07/02/2025 0.20 (H)  0.00 - 0.11 K/uL Final    NRBC (Absolute) 07/02/2025 0.00  K/uL Final    Syphilis, Treponemal Qual 07/02/2025 Non-Reactive  Non-Reactive Final    Comment: Result of Non-reactive indicates no serologic evidence of  infection with Treponema pallidum.  (Incubating or early  primary syphilis cannot be excluded).      Holding Tube - Bb 07/02/2025 DONE   Final    Color 07/02/2025 Yellow   Final    Character 07/02/2025 Cloudy (A)   Final    Specific Gravity 07/02/2025 1.009  <1.035 Final    Ph 07/02/2025 6.5  5.0 - 8.0 Final    Glucose 07/02/2025 Negative  Negative mg/dL Final    Ketones 07/02/2025 Negative  Negative mg/dL Final    Protein 07/02/2025 30 (A)  Negative mg/dL Final    Bilirubin 07/02/2025 Negative  Negative Final    Urobilinogen, Urine 07/02/2025 0.2  <=1.0 EU/dL Final    Nitrite 07/02/2025 Negative  Negative Final    Leukocyte Esterase 07/02/2025 Negative  Negative Final    Occult Blood 07/02/2025 Trace (A)  Negative Final    Micro Urine Req 07/02/2025 Microscopic   Final    Strep Gp B DNA PCR 07/02/2025 Negative  Negative Final    WBC 07/02/2025 3-5  /hpf Final    Comment: Female  <12 Yr 0-2  >12 Yr 0-5  Male   0-2      RBC 07/02/2025 6-10 (A)  0 - 2 /hpf Final    Bacteria 07/02/2025 Many (A)  None /hpf Final    Epithelial Cells 07/02/2025 >20 (A)  0 - 5 /hpf Final    Please note new reference range effective 10/23/2024.    Urine Casts 07/02/2025 0-2  0 - 2 /lpf Final    Comment: Casts  present are presumed to be hyaline unless otherwise  noted.  Pathological casts are specified separately if present.      ABO Grouping Only 2025 O   Final    Rh Grouping Only 2025 POS   Final    Antibody Screen-Cod 2025 NEG   Final    Component Cellular 2025 N/A   Final    ABO Rh Confirm 2025 O POS   Final    Rubella IgG Antibody 2025 58.20  IU/mL Final    Comment: This assay is performed using electrochemiluminescence immunoassay  on Roche inocencio e immunoassay analyzers.  Interpretive Criteria:  < 10 IU/mL   Negative for anti-Rubella IgG  >= 10 IU/mL   Positive for anti-Rubella IgG  The presence of IgG antibodies to Rubella virus is an indication of previous  exposure either by prior infection or by vaccination. Results obtained with  the [employer] Rubella IgG assay and cannot be used interchangeably with assays  from other manufacturers.         Scheduled Medications[1]     NST-reported reassuring reactive NST without contractions    Impression;  IUP AT 25w1d    2.   Twin A with prolonged rupture of membranes since early second trimester.  Anhydramnios on ultrasound examination    Plan;  She is hospitalized for frequent fetal assessment.  We have discussed with the OB team that intervention will be indicated on behalf of twin B.  Intervention would not be indicated on behalf of twin A with very small if any chance of survival.  Twin A is likely to have undeveloped lungs.   delivery is indeed an inferior means to help lung function and prematurely born babies.    My total time spent caring for the patient on the day of the encounter was 30 minutes.   This does not include time spent on separately billable procedures/tests.      Scott Peralta MD  [facility], [facility]  Mobile: [phone]  2025  [unfilled]         [1]   Scheduled Medications   Medication Dose Frequency    aspirin  81 mg DAILY    prenatal plus vitamin  1 Tablet DAILY    amoxicillin  250 mg Q8HRS

## 2025-07-08 PROBLEM — Z30.46 ENCOUNTER FOR SURVEILLANCE OF IMPLANTABLE SUBDERMAL CONTRACEPTIVE: Status: RESOLVED | Noted: 2017-07-10 | Resolved: 2025-07-08

## 2025-07-08 PROBLEM — O30.042 DICHORIONIC DIAMNIOTIC TWIN PREGNANCY IN SECOND TRIMESTER: Status: ACTIVE | Noted: 2025-07-08

## 2025-07-08 PROBLEM — Z11.3 ROUTINE SCREENING FOR STI (SEXUALLY TRANSMITTED INFECTION): Status: RESOLVED | Noted: 2020-10-06 | Resolved: 2025-07-08

## 2025-07-08 NOTE — PROGRESS NOTES
"[facility] ANTEPARTUM PROGRESS NOTE;    Brandie Hunter is a 24 y.o. female  at 25w2d.    Patient Active Problem List    Diagnosis Date Noted    Premature rupture of membranes in second trimester 2025    Other chest pain 10/03/2024    Shortness of breath 10/03/2024    Bradycardia 10/03/2024    Dyspepsia 2023    Muscle spasm 10/30/2023    Tension headache 10/30/2023    Gastroesophageal reflux disease without esophagitis 2022    Lymph node enlargement 2022    Environmental and seasonal allergies 2021    Hair loss 2020    Nausea 10/06/2020    Abnormal WBC count 2020    Anxiety 2020    Current moderate episode of major depressive disorder (HCC) 2020         SUBJECTIVE:  Patient seen and examined. She is doing well. No significant events overnight. Denies HA, change in vision, RUQ/epigastric pain, SOB, CP, fever, nausea/vomiting, edema or calf pain.     Contractions: denies  Leaking of fluid: present, clear   Vaginal bleeding: denies  Fetal movement: present     Review of systems; denies fevers, chills, headaches, vision changes, dizziness, or abdominal pain      Physical examination;  /67   Pulse 90   Temp 37.1 °C (98.8 °F) (Temporal)   Resp 18   Ht 1.575 m (5' 2\")   Wt 71.2 kg (157 lb)   LMP 2025 (Approximate)   SpO2 90%   BMI 28.72 kg/m²   Appearance/Psychiatric: appears well and in NAD  Constitutional: The patient is well nourished.  Respiratory: Her respiratory effort is normal.  Gastrointestinal: Soft, gravid, non-tender  Extremities: no edema      Assessment  IUP AT 25w2d   Ms. Hunter is a 24 y.o. year old female at 25w2d admitted on 25 with di-di monozygotic twins with PPROM in twin A. 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 Pt was seen by [facility] for consultation due to anhydramnios around 14w.     Pt is s/p BMZ  & 7/3, will be finishing " latency ABX today. GBS negative. Neonatology consult completed.     [facility] ultrasound:  6/23 Growth Scan: Twin A 483g 10%, Twin B 553g 35%  7/1 DOLLY:     Twin A MVP 1.3cm oligohydramnios, breech.     Twin B MVP 3.9cm, variable presentation.     Plan;  # Continue observation  # NST 1h qShift   # Continue ASA 81mg for pre-e prevention   # Plan for repeat Growth US this week       Carol العراقي P.A.-C.  [facility] Service   Obstetrics and Gynecology  7/8/2025     8:38 AM      Patient was seen in conjunction with MD BEKAH Quiros who was present for consult and agrees with the plan.      I reviewed the medical records of this patient.  I examined the patient.  I checked on her history.  I have read the report above by PA on [facility] service.  I agree with the findings and plans as described in the above note.    My total time spent caring for the patient on the day of the encounter was 30 minutes.   This does not include time spent on separately billable procedures/tests.      MD BEKAH Steven, [facility]  Mobile: [phone]  7/9/2025  [unfilled]

## 2025-07-08 NOTE — CARE PLAN
The patient is Watcher - Medium risk of patient condition declining or worsening    Shift Goals  Clinical Goals: Promote Rest, Monitor babies  Patient Goals: Rest  Family Goals: support      Problem: Knowledge Deficit - L&D  Goal: Patient and family/caregivers will demonstrate understanding of plan of care, disease process/condition, diagnostic tests and medications  Outcome: Progressing   Updating on POC.  Problem: Risk for Infection and Impaired Wound Healing  Goal: Patient will remain free from infection  Outcome: Progressing   Monitor VS.

## 2025-07-09 ENCOUNTER — APPOINTMENT (OUTPATIENT)
Dept: RADIOLOGY | Facility: MEDICAL CENTER | Age: 25
End: 2025-07-09
Attending: FAMILY MEDICINE
Payer: COMMERCIAL

## 2025-07-09 ENCOUNTER — ANCILLARY PROCEDURE (OUTPATIENT)
Dept: OBGYN | Facility: MEDICAL CENTER | Age: 25
End: 2025-07-09
Attending: OBSTETRICS & GYNECOLOGY
Payer: COMMERCIAL

## 2025-07-09 ENCOUNTER — APPOINTMENT (OUTPATIENT)
Dept: RADIOLOGY | Facility: MEDICAL CENTER | Age: 25
End: 2025-07-09
Attending: OBSTETRICS & GYNECOLOGY
Payer: COMMERCIAL

## 2025-07-09 ASSESSMENT — PAIN DESCRIPTION - PAIN TYPE: TYPE: ACUTE PAIN

## 2025-07-09 NOTE — CARE PLAN
The patient is Stable - Low risk of patient condition declining or worsening    Shift Goals  Clinical Goals: Monitoring of babies  Patient Goals: Healthy mom and babies  Family Goals: Support    Progress made toward(s) clinical / shift goals:    Problem: Knowledge Deficit - L&D  Goal: Patient and family/caregivers will demonstrate understanding of plan of care, disease process/condition, diagnostic tests and medications  Outcome: Progressing   Pt continually updated and educated on ongoing POC  Problem: Psychosocial - L&D  Goal: Patient's level of anxiety will decrease  Outcome: Progressing

## 2025-07-09 NOTE — PROGRESS NOTES
0700-Report received from Awa URBAN. GRETTA discussed  1900-Report given to Riddhi GLYNN discussed

## 2025-07-09 NOTE — PROCEDURES
Vascular Access Team     Date of Insertion: 07/09/2025  Arm Circumference: 28.5  Internal length: 43  External Length: 3  Vein Occupancy %: 27   Reason for PICC: access  Labs: WBC 11.9, , BUN 8, Cr 0.45, , INR n/a     Consents confirmed, vessel patency confirmed with ultrasound. Risks and benefits of procedure explained to patient and education regarding central line associated bloodstream infections provided. Questions answered.      PICC placed in LUE per licensed provider order with ultrasound guidance.  4 Fr, 1 lumen PICC placed in Basilic vein after 1 attempt(s). 2 mL of 1% lidocaine injected intradermally at the insertion site. A 21 gauge microintroducer needle was visualized entering the vein and modified Seldinger technique was used to obtain access to the vein. 43 cm catheter inserted and brisk blood return was observed from each lumen upon aspiration. Line secured at the 3 cm marker. TCS stylet removed and observed to be fully intact. Each lumen flushed using pulsatile method without resistance with 10 mL 0.9% normal saline. PICC line secured with Biopatch and Tegaderm.     PICC tip placement location is confirmed by nurse to be in the Superior Vena Cava (SVC) utilizing 3CG technology. PICC line is appropriate for use at this time. Patient tolerated procedure well, without complications.  Patient condition relayed to primary RN or ordering physician via this post procedure note in the EMR.      Ultrasound images uploaded to PACS and viewable in the EMR - yes  Ultrasound imaged printed and placed in paper chart - no     BARD Power PICC ref # [id number], Lot # [id number], Expiration Date 12/31/2025   17-Apr-2024 21:17

## 2025-07-09 NOTE — CARE PLAN
The patient is Watcher - Medium risk of patient condition declining or worsening    Shift Goals  Clinical Goals: Monitor babies, no s/s of infection  Patient Goals: Stay pregnant, rest  Family Goals: Support    Progress made toward(s) clinical / shift goals:    Problem: Pain  Goal: Patient's pain will be alleviated or reduced to the patient’s comfort goal  Outcome: Progressing  Note: Pt continuously monitored for pain, educated on pain management options. Call light within reach, pt understands to call for RN regarding any needs or concerns.      Problem: Risk for Infection and Impaired Wound Healing  Goal: Patient will remain free from infection  Outcome: Progressing  Note: Pt will remain afebrile and will show no s/s of infection.       Patient is not progressing towards the following goals:

## 2025-07-09 NOTE — PROGRESS NOTES
"[facility] ANTEPARTUM PROGRESS NOTE;    Brandie Hunter is a 24 y.o. female  at 25w3d.    Patient Active Problem List    Diagnosis Date Noted    Dichorionic diamniotic twin pregnancy in second trimester 2025    Premature rupture of membranes in second trimester 2025    Other chest pain 10/03/2024    Shortness of breath 10/03/2024    Bradycardia 10/03/2024    Dyspepsia 2023    Muscle spasm 10/30/2023    Tension headache 10/30/2023    Gastroesophageal reflux disease without esophagitis 2022    Lymph node enlargement 2022    Environmental and seasonal allergies 2021    Hair loss 2020    Nausea 10/06/2020    Abnormal WBC count 2020    Anxiety 2020    Current moderate episode of major depressive disorder (HCC) 2020         SUBJECTIVE:  Patient seen and examined. She is doing well. No significant events overnight. Denies HA, change in vision, RUQ/epigastric pain, SOB, CP, fever, nausea/vomiting, edema or calf pain.     Contractions: denies  Leaking of fluid: continues to leak clear fluid  Vaginal bleeding: denies  Fetal movement: present     Review of systems; denies fevers, chills, headaches, vision changes, dizziness, or abdominal pain      Physical examination;  /69   Pulse 80   Temp 37.1 °C (98.7 °F) (Temporal)   Resp 16   Ht 1.575 m (5' 2\")   Wt 71.2 kg (157 lb)   LMP 2025 (Approximate)   SpO2 96%   BMI 28.72 kg/m²   Appearance/Psychiatric: appears well and in NAD  Constitutional: The patient is well nourished.  Respiratory: Her respiratory effort is normal.  Gastrointestinal: Soft, gravid, non-tender  Extremities: no edema        Assessment  IUP AT 25w3d   Ms. Hunter is a 24 y.o. year old female at 25w3d admitted on 25 with di-di monozygotic twins with PPROM in twin A. 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 Pt was seen by [facility] for " consultation due to anhydramnios around 14w.      Pt is s/p BMZ 7/2 & 7/3 and latency ABX. GBS negative. Neonatology consult completed.      [facility] ultrasound:  6/23 Growth Scan: Twin A 483g 10%, Twin B 553g 35%  7/1 DOLLY:     Twin A MVP 1.3cm oligohydramnios, breech.     Twin B MVP 3.9cm, variable presentation.     Plan;  # Continue observation  # NST 1h qShift   # Continue ASA 81mg for pre-e prevention   # Plan for repeat Growth US this week   # PICC line ordered per pt request   # Plan to intervene only on behalf of Baby B, not Baby A due to poor prognosis.      Carol العراقي P.A.-C.  [facility] Service   Obstetrics and Gynecology  7/9/2025     8:33 AM      Patient was seen in conjunction with Dr Fabian GODFREY  who was present for the consult and agrees with the plan.      I reviewed the medical records of this patient.  I examined the patient.  I checked on her history.  I have read the report above by PA on [facility] service.  I agree with the findings and plans as described in the above note.    My total time spent caring for the patient on the day of the encounter was 25 minutes.   This does not include time spent on separately billable procedures/tests.      MD BEKAH Steven, R  Mobile: [phone]  7/9/2025  [unfilled]

## 2025-07-10 ASSESSMENT — PATIENT HEALTH QUESTIONNAIRE - PHQ9
1. LITTLE INTEREST OR PLEASURE IN DOING THINGS: NOT AT ALL
SUM OF ALL RESPONSES TO PHQ9 QUESTIONS 1 AND 2: 0
2. FEELING DOWN, DEPRESSED, IRRITABLE, OR HOPELESS: NOT AT ALL

## 2025-07-10 ASSESSMENT — PAIN DESCRIPTION - PAIN TYPE: TYPE: ACUTE PAIN

## 2025-07-10 NOTE — PROGRESS NOTES
Report received from Riddhi URBAN, POC disussed.     0805 Pt up to use restroom and finished with breakfast    1008 RN at bedside, pt ready for fetal monitoring. Monitors placed.     1130 RN at bedside, pt denies any further needs, call light within reach.     1900 Report given to Elena URBAN, POC discussed.

## 2025-07-10 NOTE — PROGRESS NOTES
"[facility] ANTEPARTUM PROGRESS NOTE;    Brandie Hunter is a 24 y.o. female  at 25w4d.    Patient Active Problem List    Diagnosis Date Noted    Dichorionic diamniotic twin pregnancy in second trimester 2025    Premature rupture of membranes in second trimester 2025    Other chest pain 10/03/2024    Shortness of breath 10/03/2024    Bradycardia 10/03/2024    Dyspepsia 2023    Muscle spasm 10/30/2023    Tension headache 10/30/2023    Gastroesophageal reflux disease without esophagitis 2022    Lymph node enlargement 2022    Environmental and seasonal allergies 2021    Hair loss 2020    Nausea 10/06/2020    Abnormal WBC count 2020    Anxiety 2020    Current moderate episode of major depressive disorder (HCC) 2020         SUBJECTIVE:  Patient seen and examined. She is doing well. No significant events overnight. Denies HA, change in vision, RUQ/epigastric pain, SOB, CP, fever, nausea/vomiting, edema or calf pain.     Contractions: denies  Leaking of fluid: mild, clear  Vaginal bleeding: denies  Fetal movement: present     Review of systems; denies fevers, chills, headaches, vision changes, dizziness, or abdominal pain      Physical examination;  /72   Pulse 93   Temp 36.3 °C (97.3 °F) (Temporal)   Resp 18   Ht 1.575 m (5' 2\")   Wt 71.2 kg (157 lb)   LMP 2025 (Approximate)   SpO2 95%   BMI 28.72 kg/m²   Appearance/Psychiatric: appears well and in NAD  Constitutional: The patient is well nourished.  Respiratory: Her respiratory effort is normal.  Gastrointestinal: Soft, gravid, non-tender  Extremities: no edema      Assessment  IUP AT 25w4d   Ms. Hunter is a 24 y.o. year old female at 25w4d admitted on 25 with di-di twins with PPROM in twin A. 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 Pt was seen by [facility] for consultation due to anhydramnios " "around 14w.     Pt is s/p BMZ 7/2 & 7/3 and latency ABX. GBS negative. Neonatology consult completed. PICC line in place.      [facility] ultrasound:  6/23 Growth Scan: Twin A 483g 10%, Twin B 553g 35%  7/1 DOLLY:     Twin A MVP 1.3cm oligohydramnios, breech.     Twin B MVP 3.9cm, variable presentation.   An ultrasound was completed yesterday, no report available yet.     Plan;  # Continue observation  # NST 1h qShift   # Continue ASA 81mg for pre-e prevention   # Plan to intervene only on behalf of Baby B, not Baby A due to poor prognosis.         Carol العراقي P.A.-C.  [facility] Service   Obstetrics and Gynecology  7/10/2025     8:36 AM      Patient was seen in conjunction with MD BEKAH Quiros who was present for the consult and agrees with the plan.      MFM Note  This twin gestation was assigned erroneously as monozygotic.  I cannot prove mono zygosity at this stage; it is highly likely that this is a dichorionic/diamniotic and dizygotic twin pregnancy.  Dichorionic twinning, whether dizygotic or monozygotic, are not expected to have twin-twin transfusion (with some extreme exceptions.)  My plan is to continue fetal growth examination.  Twin \"A\" has ruptured membranes presumably from early pregnancy with extremely unlikely chance of survival at this stage.    Intervention on behalf of twin \"A\" would not be in the best benefit of this pregnancy.  Twin \"B\" has growth within normal range and does not need Doppler studies at this stage.  I reviewed the medical records of this patient.  I examined the patient.  I checked on her history.  I have read the report above by PA on [facility] service.  I agree with the findings and plans as described in the above note.    My total time spent caring for the patient on the day of the encounter was 30 minutes.   This does not include time spent on separately billable procedures/tests.      MD RIGOBERTO Steven, R  Mobile: [phone]  7/10/2025  [unfilled]  "

## 2025-07-10 NOTE — PROGRESS NOTES
1900: Report received from Vane URBAN, discussed POC.     0700: Report given to Kati URBAN discussed POC, relinquished care of patient at this time.

## 2025-07-10 NOTE — CARE PLAN
The patient is Stable - Low risk of patient condition declining or worsening    Shift Goals  Clinical Goals: Fetal monitoring  Patient Goals: rest, stay pregnant  Family Goals: support    Progress made toward(s) clinical / shift goals:    Problem: Knowledge Deficit - L&D  Goal: Patient and family/caregivers will demonstrate understanding of plan of care, disease process/condition, diagnostic tests and medications  Outcome: Progressing  Note: Oriented patient to unit, discussed bed controls and call light. Educated patient on labor and delivery process. Discussed POC with patient and support persons, patient agrees to and verbalized understanding of POC. All questions answered at this time.      Problem: Pain  Goal: Patient's pain will be alleviated or reduced to the patient’s comfort goal  Outcome: Progressing  Note: Patient states she is not experiencing pain at this time.       Patient is not progressing towards the following goals:

## 2025-07-11 ASSESSMENT — PAIN DESCRIPTION - PAIN TYPE: TYPE: ACUTE PAIN

## 2025-07-11 NOTE — PROGRESS NOTES
0905 Report received from RAJNI Hadley. POC discussed. Assuming care.     1115 This RN at bedside for routine fetal monitoring. Pt denies any CTX or VB. Pt report +FM x2 and reports small gushes of fluid that is consistent with her normal and has remained clear in color. EFM x2 & TOCO applied.    1140 Report given to RAJNI Hadley. POC discussed. Care relinquished.

## 2025-07-11 NOTE — CARE PLAN
Problem: Psychosocial - L&D  Goal: Patient's level of anxiety will decrease  Outcome: Progressing  Note: Pt encouraged to ask questions and voice any concerns as they arise     Problem: Pain  Goal: Patient's pain will be alleviated or reduced to the patient’s comfort goal  Outcome: Progressing  Note: Pt denies any pain or ctx. Will notify RN if pain develops   The patient is Watcher - Medium risk of patient condition declining or worsening    Shift Goals  Clinical Goals: reassuring FHR for both babies  Patient Goals: rest and fetal growth  Family Goals: support    Progress made toward(s) clinical / shift goals:  FHR reassuring at this time. POC discussed and all questions answered    Patient is not progressing towards the following goals:

## 2025-07-11 NOTE — CARE PLAN
The patient is Stable - Low risk of patient condition declining or worsening    Shift Goals  Clinical Goals: Reactive FHT  Patient Goals: Healthy mom, healthy baby  Family Goals: Support    Progress made toward(s) clinical / shift goals:  Progressing    Problem: Knowledge Deficit - L&D  Goal: Patient and family/caregivers will demonstrate understanding of plan of care, disease process/condition, diagnostic tests and medications  Outcome: Progressing  Note: Questions and concerns answered. Will continue to include pt in POC and educate as necessary.     Problem: Risk for Injury  Goal: Patient and fetus will be free of preventable injury/complications  Outcome: Progressing  Note: 1 hr qshift FHT

## 2025-07-11 NOTE — PROGRESS NOTES
0700: Report from RAJNI Parker    0930: Report to RAJNI Thomas.    1140: Report from RAJNI Thomas.    1856: Report to RAJNI Parker.

## 2025-07-11 NOTE — PROGRESS NOTES
"[facility] ANTEPARTUM PROGRESS NOTE;    Brandie Hunter is a 24 y.o. female  at 25w5d.    Patient Active Problem List    Diagnosis Date Noted    Dichorionic diamniotic twin pregnancy in second trimester 2025    Premature rupture of membranes in second trimester 2025    Other chest pain 10/03/2024    Shortness of breath 10/03/2024    Bradycardia 10/03/2024    Dyspepsia 2023    Muscle spasm 10/30/2023    Tension headache 10/30/2023    Gastroesophageal reflux disease without esophagitis 2022    Lymph node enlargement 2022    Environmental and seasonal allergies 2021    Hair loss 2020    Nausea 10/06/2020    Abnormal WBC count 2020    Anxiety 2020    Current moderate episode of major depressive disorder (HCC) 2020         SUBJECTIVE:  Patient seen and examined. She is doing well. No significant events overnight. Denies HA, change in vision, RUQ/epigastric pain, SOB, CP, fever, nausea/vomiting, edema or calf pain.     Contractions: denies  Leaking of fluid: minimal clear leakage  Vaginal bleeding: denies  Fetal movement: present     Review of systems; denies fevers, chills, headaches, vision changes, dizziness, or abdominal pain      Physical examination;  /75   Pulse 100   Temp 37.2 °C (98.9 °F) (Temporal)   Resp 16   Ht 1.575 m (5' 2\")   Wt 71.2 kg (157 lb)   LMP 2025 (Approximate)   SpO2 98%   BMI 28.72 kg/m²   Appearance/Psychiatric: appears well and in NAD  Constitutional: The patient is well nourished.  Respiratory: Her respiratory effort is normal.  Gastrointestinal: Soft, gravid, non-tender  Extremities: no edema        Assessment  IUP AT 25w5d   Ms. Hunter is a 24 y.o. year old female at 25w5d admitted on 25 with di-di twins with PPROM in twin A. 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 Pt was seen by [facility] for consultation due to " anhydramnios around 14w.      Pt is s/p BMZ 7/2 & 7/3 and latency ABX. GBS negative. Neonatology consult completed. PICC line in place.      [facility] ultrasound:  6/23 Growth Scan: Twin A 483g 10%, Twin B 553g 35%  7/1 Fluid check    Twin A MVP 1.3cm oligohydramnios, breech.     Twin B MVP 3.9cm, variable presentation.   7/8 Fluid check     Twin A MVP 1.1cm     Twin B MVP 4.0 cm     Plan;  # Continue observation  # NST 1h qShift   # Continue ASA 81mg for pre-e prevention   # Plan to intervene only on behalf of Baby B, not Baby A due to poor prognosis.    # Growth US was ordered for pt this week. MCA dopplers were done in error. Fluid check results are above. Growth US is still pending and will hopefully be done this week.         Carol العراقي P.A.-C.  [facility] Service   Obstetrics and Gynecology  7/11/2025     9:19 AM      Patient was seen in conjunction with MD BEKAH Quiros who consulted and agrees with the plan.      I reviewed the medical records of this patient.  I examined the patient.  I checked on her history.  I have read the report above by PA on [facility] service.  I agree with the findings and plans as described in the above note.    My total time spent caring for the patient on the day of the encounter was 25 minutes.   This does not include time spent on separately billable procedures/tests.      MD BEKAH Steven, [facility]  Mobile: [phone]  7/11/2025  [unfilled]

## 2025-07-12 ASSESSMENT — PAIN DESCRIPTION - PAIN TYPE: TYPE: ACUTE PAIN

## 2025-07-12 NOTE — PROGRESS NOTES
1900 - Report received from RAJNI Hadley  1930 - Assessment completed. Pt reports good fm. Denies any ctx, pain, vag bleeding or leaking increased fluid. Requests fetal monitoring around 2100  0700 - Report given to RAJNI hadley

## 2025-07-12 NOTE — CARE PLAN
Problem: Pain  Goal: Patient's pain will be alleviated or reduced to the patient’s comfort goal  Outcome: Progressing  Note: Pt denies feeling any ctx or abd pain, Will notify RN if ctx ir abd pain occur     Problem: Risk for Infection and Impaired Wound Healing  Goal: Patient will remain free from infection  Outcome: Progressing  Note: Pt afebrile and no s/sx of infection noted at this time   The patient is Watcher - Medium risk of patient condition declining or worsening    Shift Goals  Clinical Goals: Reassuring FHR, prolong pregnancy  Patient Goals: stay pregnant  Family Goals: support    Progress made toward(s) clinical / shift goals:  no sign of labor noted. FHR overall reassuring. Baby A had a decel    Patient is not progressing towards the following goals:n/a

## 2025-07-12 NOTE — PROGRESS NOTES
"ANTEPARTUM PROGRESS NOTE;    Brandie Hunter is a 24 y.o. female  at 25w6d.  She is 25 weeks 6 days today admitted for hospital observation with di/di twin pregnancy complicated by very early premature rupture of membrane of twin \"A\".    Subjective:   CC: She is resting comfortably in bed.  She endorses that she walks around and uses the bathroom.  Hx: Twin pregnancy was diagnosed early and somehow the notes have mentioned monozygotic twins.  However, chorionicity is determined as Di/Di Twins.  RoS: Continues to have leakage of fluid per vagina and small volume.  She has not noticed any blood staining.  Denies uterine contractions, fever, chills or abdominal pain.    Objective:  VS: /59   Pulse 74   Temp 36.8 °C (98.2 °F) (Temporal)   Resp 16   Ht 1.575 m (5' 2\")   Wt 71.2 kg (157 lb)   LMP 2025 (Approximate)   SpO2 97%   BMI 28.72 kg/m² , Stable  PE: Alert and oriented x3. Gen.-well-developed well-nourished female in no apparent distress  HEENT-normocephalic, nontraumatic,EOMI,PERRLA  Abdomen-Soft, gravid, nontender  Extremities without cyanosis clubbing or edema  Neurologic: Deferred.    Fetal Assessment:    Labs:  Admission on 2025   Component Date Value Ref Range Status    WBC 2025 11.9 (H)  4.8 - 10.8 K/uL Final    RBC 2025 3.76 (L)  4.20 - 5.40 M/uL Final    Hemoglobin 2025 11.9 (L)  12.0 - 16.0 g/dL Final    Hematocrit 2025 34.2 (L)  37.0 - 47.0 % Final    MCV 2025 91.0  81.4 - 97.8 fL Final    MCH 2025 31.6  27.0 - 33.0 pg Final    MCHC 2025 34.8  32.2 - 35.5 g/dL Final    RDW 2025 44.6  35.9 - 50.0 fL Final    Platelet Count 2025 255  164 - 446 K/uL Final    MPV 2025 9.9  9.0 - 12.9 fL Final    Neutrophils-Polys 2025 75.30 (H)  44.00 - 72.00 % Final    Lymphocytes 2025 13.50 (L)  22.00 - 41.00 % Final    Monocytes 2025 7.90  0.00 - 13.40 % Final    Eosinophils 2025 1.10  0.00 - 6.90 % " Final    Basophils 07/02/2025 0.50  0.00 - 1.80 % Final    Immature Granulocytes 07/02/2025 1.70 (H)  0.00 - 0.90 % Final    Nucleated RBC 07/02/2025 0.00  0.00 - 0.20 /100 WBC Final    Neutrophils (Absolute) 07/02/2025 8.97 (H)  1.82 - 7.42 K/uL Final    Includes immature neutrophils, if present.    Lymphs (Absolute) 07/02/2025 1.61  1.00 - 4.80 K/uL Final    Monos (Absolute) 07/02/2025 0.94 (H)  0.00 - 0.85 K/uL Final    Eos (Absolute) 07/02/2025 0.13  0.00 - 0.51 K/uL Final    Baso (Absolute) 07/02/2025 0.06  0.00 - 0.12 K/uL Final    Immature Granulocytes (abs) 07/02/2025 0.20 (H)  0.00 - 0.11 K/uL Final    NRBC (Absolute) 07/02/2025 0.00  K/uL Final    Syphilis, Treponemal Qual 07/02/2025 Non-Reactive  Non-Reactive Final    Comment: Result of Non-reactive indicates no serologic evidence of  infection with Treponema pallidum.  (Incubating or early  primary syphilis cannot be excluded).      Holding Tube - Bb 07/02/2025 DONE   Final    Color 07/02/2025 Yellow   Final    Character 07/02/2025 Cloudy (A)   Final    Specific Gravity 07/02/2025 1.009  <1.035 Final    Ph 07/02/2025 6.5  5.0 - 8.0 Final    Glucose 07/02/2025 Negative  Negative mg/dL Final    Ketones 07/02/2025 Negative  Negative mg/dL Final    Protein 07/02/2025 30 (A)  Negative mg/dL Final    Bilirubin 07/02/2025 Negative  Negative Final    Urobilinogen, Urine 07/02/2025 0.2  <=1.0 EU/dL Final    Nitrite 07/02/2025 Negative  Negative Final    Leukocyte Esterase 07/02/2025 Negative  Negative Final    Occult Blood 07/02/2025 Trace (A)  Negative Final    Micro Urine Req 07/02/2025 Microscopic   Final    Strep Gp B DNA PCR 07/02/2025 Negative  Negative Final    WBC 07/02/2025 3-5  /hpf Final    Comment: Female  <12 Yr 0-2  >12 Yr 0-5  Male   0-2      RBC 07/02/2025 6-10 (A)  0 - 2 /hpf Final    Bacteria 07/02/2025 Many (A)  None /hpf Final    Epithelial Cells 07/02/2025 >20 (A)  0 - 5 /hpf Final    Please note new reference range effective 10/23/2024.     Urine Casts 07/02/2025 0-2  0 - 2 /lpf Final    Comment: Casts present are presumed to be hyaline unless otherwise  noted.  Pathological casts are specified separately if present.      ABO Grouping Only 07/02/2025 O   Final    Rh Grouping Only 07/02/2025 POS   Final    Antibody Screen-Cod 07/02/2025 NEG   Final    Component Cellular 07/02/2025 N/A   Final    ABO Rh Confirm 07/02/2025 O POS   Final    Rubella IgG Antibody 07/02/2025 58.20  IU/mL Final    Comment: This assay is performed using electrochemiluminescence immunoassay  on Roche inocencio e immunoassay analyzers.  Interpretive Criteria:  < 10 IU/mL   Negative for anti-Rubella IgG  >= 10 IU/mL   Positive for anti-Rubella IgG  The presence of IgG antibodies to Rubella virus is an indication of previous  exposure either by prior infection or by vaccination. Results obtained with  the Elecsys Rubella IgG assay and cannot be used interchangeably with assays  from other manufacturers.      ABO Grouping Only 07/09/2025 O   Final    Rh Grouping Only 07/09/2025 POS   Final    Antibody Screen-Cod 07/09/2025 NEG   Final    Sodium 07/09/2025 135  135 - 145 mmol/L Final    Potassium 07/09/2025 3.9  3.6 - 5.5 mmol/L Final    Chloride 07/09/2025 104  96 - 112 mmol/L Final    Co2 07/09/2025 22  20 - 33 mmol/L Final    Anion Gap 07/09/2025 9.0  7.0 - 16.0 Final    Glucose 07/09/2025 84  65 - 99 mg/dL Final    Bun 07/09/2025 8  8 - 22 mg/dL Final    Creatinine 07/09/2025 0.45 (L)  0.50 - 1.40 mg/dL Final    Calcium 07/09/2025 8.7  8.5 - 10.5 mg/dL Final    Correct Calcium 07/09/2025 9.1  8.5 - 10.5 mg/dL Final    AST(SGOT) 07/09/2025 17  12 - 45 U/L Final    ALT(SGPT) 07/09/2025 20  2 - 50 U/L Final    Alkaline Phosphatase 07/09/2025 88  30 - 99 U/L Final    Total Bilirubin 07/09/2025 0.3  0.1 - 1.5 mg/dL Final    Albumin 07/09/2025 3.5  3.2 - 4.9 g/dL Final    Total Protein 07/09/2025 6.2  6.0 - 8.2 g/dL Final    Globulin 07/09/2025 2.7  1.9 - 3.5 g/dL Final    A-G Ratio  "07/09/2025 1.3  g/dL Final    GFR (CKD-EPI) 07/09/2025 138  >60 mL/min/1.73 m 2 Final    Comment: Estimated Glomerular Filtration Rate is calculated using  race neutral CKD-EPI 2021 equation per NKF-ASN recommendations.         Scheduled Medications[1] Prenatal vitamins and iron    NST-as performed and read by myself; reactive NST without contractions x 2.    Impression;  IUP AT 25w6d    2.   Loraine twin gestation with early premature rupture of membrane Twin \"A\".    Plan;  We will continue observation in the hospital.  We will watch for evidence of chorioamnionitis and any evidence of marginal placental abruption.  Per obstetric and MFM consensus, we plan not to intervene on behalf of Twin \"A\" who has little to none chance of survival secondary to expected severe pulmonary hypoplasia.  We plan to intervene on behalf of twin \"B\", as well as maternal wellbeing, as indicated.    My total time spent caring for the patient on the day of the encounter was 25 minutes.   This does not include time spent on separately billable procedures/tests.      Scott Peralta MD  [facility], [employer]  Mobile: [phone]  7/12/2025  [unfilled]         [1]   Scheduled Medications   Medication Dose Frequency    aspirin  81 mg DAILY    prenatal plus vitamin  1 Tablet DAILY     "

## 2025-07-12 NOTE — CARE PLAN
The patient is Stable - Low risk of patient condition declining or worsening    Shift Goals  Clinical Goals: Reassuring FHT  Patient Goals: Healthy mom, healthy babies  Family Goals: Support    Progress made toward(s) clinical / shift goals:  Progressing    Problem: Risk for Infection and Impaired Wound Healing  Goal: Patient will remain free from infection  Outcome: Progressing  Note: [id number]. Maintain aseptic environment.      Problem: Risk for Injury  Goal: Patient and fetus will be free of preventable injury/complications  Outcome: Progressing  Note: 1 hr FHT monitoring

## 2025-07-13 ASSESSMENT — PAIN DESCRIPTION - PAIN TYPE: TYPE: ACUTE PAIN

## 2025-07-13 NOTE — PROGRESS NOTES
1900 - Report received from RAJNI Hadley  1930 - Assessment completed. Pt reports good FM, Denies any ctx, LOF or vag bleeding. Monitors applied  0700 - Report given to RAJNI Loyola

## 2025-07-13 NOTE — PROGRESS NOTES
0700: Report received from Elena URBAN, POC discussed.    0830: Pt reports +FM, denies VB/LOF/Uc's.     1900: Report given to Gloria URBAN, POC discussed.

## 2025-07-13 NOTE — PROGRESS NOTES
"ANTEPARTUM PROGRESS NOTE;    Brandie Hunter is a 24 y.o. female  at 26w0d.  Brandie is admitted for premature rupture of membrane between \"A\" that presumably happened early in second trimester.    Subjective:   CC: Yesterday she felt a little nauseous.  She does not describe it as severe nausea and endorses that she had not had nausea throughout this pregnancy until now.  She has no other symptoms to go with at this time.  She does not attribute her nausea to food.  The nausea may be concerning and may be a mild GI virus or may be an early sign of  labor.  Hx: Twin gestation with PROM twin \"A\" since early in this pregnancy.  RoS: denies vaginal bleeding, she endorses having episodes of leakage of fluid per vagina, she denies having uterine contractions, fever chills or abdominal pain    Objective:  VS: /75   Pulse (!) 55   Temp 36.7 °C (98 °F) (Temporal)   Resp 15   Ht 1.575 m (5' 2\")   Wt 71.2 kg (157 lb)   LMP 2025 (Approximate)   SpO2 (!) 86%   BMI 28.72 kg/m² , Stable  PE: Alert and oriented x3. Gen.-well-developed well-nourished female in no apparent distress  HEENT-normocephalic, nontraumatic,EOMI,PERRLA  Abdomen-Soft, gravid, nontender per patient report  Extremities without cyanosis clubbing or edema  Neurologic exam deferred    Fetal Assessment: FHR is reassuring x 2 no contractions or uterine irritability pattern are seen on prolonged tocodynamometry last night at 8 PM    Labs:  Admission on 2025   Component Date Value Ref Range Status    WBC 2025 11.9 (H)  4.8 - 10.8 K/uL Final    RBC 2025 3.76 (L)  4.20 - 5.40 M/uL Final    Hemoglobin 2025 11.9 (L)  12.0 - 16.0 g/dL Final    Hematocrit 2025 34.2 (L)  37.0 - 47.0 % Final    MCV 2025 91.0  81.4 - 97.8 fL Final    MCH 2025 31.6  27.0 - 33.0 pg Final    MCHC 2025 34.8  32.2 - 35.5 g/dL Final    RDW 2025 44.6  35.9 - 50.0 fL Final    Platelet Count 2025 255  164 " - 446 K/uL Final    MPV 07/02/2025 9.9  9.0 - 12.9 fL Final    Neutrophils-Polys 07/02/2025 75.30 (H)  44.00 - 72.00 % Final    Lymphocytes 07/02/2025 13.50 (L)  22.00 - 41.00 % Final    Monocytes 07/02/2025 7.90  0.00 - 13.40 % Final    Eosinophils 07/02/2025 1.10  0.00 - 6.90 % Final    Basophils 07/02/2025 0.50  0.00 - 1.80 % Final    Immature Granulocytes 07/02/2025 1.70 (H)  0.00 - 0.90 % Final    Nucleated RBC 07/02/2025 0.00  0.00 - 0.20 /100 WBC Final    Neutrophils (Absolute) 07/02/2025 8.97 (H)  1.82 - 7.42 K/uL Final    Includes immature neutrophils, if present.    Lymphs (Absolute) 07/02/2025 1.61  1.00 - 4.80 K/uL Final    Monos (Absolute) 07/02/2025 0.94 (H)  0.00 - 0.85 K/uL Final    Eos (Absolute) 07/02/2025 0.13  0.00 - 0.51 K/uL Final    Baso (Absolute) 07/02/2025 0.06  0.00 - 0.12 K/uL Final    Immature Granulocytes (abs) 07/02/2025 0.20 (H)  0.00 - 0.11 K/uL Final    NRBC (Absolute) 07/02/2025 0.00  K/uL Final    Syphilis, Treponemal Qual 07/02/2025 Non-Reactive  Non-Reactive Final    Comment: Result of Non-reactive indicates no serologic evidence of  infection with Treponema pallidum.  (Incubating or early  primary syphilis cannot be excluded).      Holding Tube - Bb 07/02/2025 DONE   Final    Color 07/02/2025 Yellow   Final    Character 07/02/2025 Cloudy (A)   Final    Specific Gravity 07/02/2025 1.009  <1.035 Final    Ph 07/02/2025 6.5  5.0 - 8.0 Final    Glucose 07/02/2025 Negative  Negative mg/dL Final    Ketones 07/02/2025 Negative  Negative mg/dL Final    Protein 07/02/2025 30 (A)  Negative mg/dL Final    Bilirubin 07/02/2025 Negative  Negative Final    Urobilinogen, Urine 07/02/2025 0.2  <=1.0 EU/dL Final    Nitrite 07/02/2025 Negative  Negative Final    Leukocyte Esterase 07/02/2025 Negative  Negative Final    Occult Blood 07/02/2025 Trace (A)  Negative Final    Micro Urine Req 07/02/2025 Microscopic   Final    Strep Gp B DNA PCR 07/02/2025 Negative  Negative Final    WBC 07/02/2025 3-5   /hpf Final    Comment: Female  <12 Yr 0-2  >12 Yr 0-5  Male   0-2      RBC 07/02/2025 6-10 (A)  0 - 2 /hpf Final    Bacteria 07/02/2025 Many (A)  None /hpf Final    Epithelial Cells 07/02/2025 >20 (A)  0 - 5 /hpf Final    Please note new reference range effective 10/23/2024.    Urine Casts 07/02/2025 0-2  0 - 2 /lpf Final    Comment: Casts present are presumed to be hyaline unless otherwise  noted.  Pathological casts are specified separately if present.      ABO Grouping Only 07/02/2025 O   Final    Rh Grouping Only 07/02/2025 POS   Final    Antibody Screen-Cod 07/02/2025 NEG   Final    Component Cellular 07/02/2025 N/A   Final    ABO Rh Confirm 07/02/2025 O POS   Final    Rubella IgG Antibody 07/02/2025 58.20  IU/mL Final    Comment: This assay is performed using electrochemiluminescence immunoassay  on Roche inocencio e immunoassay analyzers.  Interpretive Criteria:  < 10 IU/mL   Negative for anti-Rubella IgG  >= 10 IU/mL   Positive for anti-Rubella IgG  The presence of IgG antibodies to Rubella virus is an indication of previous  exposure either by prior infection or by vaccination. Results obtained with  the Elecsys Rubella IgG assay and cannot be used interchangeably with assays  from other manufacturers.      ABO Grouping Only 07/09/2025 O   Final    Rh Grouping Only 07/09/2025 POS   Final    Antibody Screen-Cod 07/09/2025 NEG   Final    Sodium 07/09/2025 135  135 - 145 mmol/L Final    Potassium 07/09/2025 3.9  3.6 - 5.5 mmol/L Final    Chloride 07/09/2025 104  96 - 112 mmol/L Final    Co2 07/09/2025 22  20 - 33 mmol/L Final    Anion Gap 07/09/2025 9.0  7.0 - 16.0 Final    Glucose 07/09/2025 84  65 - 99 mg/dL Final    Bun 07/09/2025 8  8 - 22 mg/dL Final    Creatinine 07/09/2025 0.45 (L)  0.50 - 1.40 mg/dL Final    Calcium 07/09/2025 8.7  8.5 - 10.5 mg/dL Final    Correct Calcium 07/09/2025 9.1  8.5 - 10.5 mg/dL Final    AST(SGOT) 07/09/2025 17  12 - 45 U/L Final    ALT(SGPT) 07/09/2025 20  2 - 50 U/L Final     Alkaline Phosphatase 07/09/2025 88  30 - 99 U/L Final    Total Bilirubin 07/09/2025 0.3  0.1 - 1.5 mg/dL Final    Albumin 07/09/2025 3.5  3.2 - 4.9 g/dL Final    Total Protein 07/09/2025 6.2  6.0 - 8.2 g/dL Final    Globulin 07/09/2025 2.7  1.9 - 3.5 g/dL Final    A-G Ratio 07/09/2025 1.3  g/dL Final    GFR (CKD-EPI) 07/09/2025 138  >60 mL/min/1.73 m 2 Final    Comment: Estimated Glomerular Filtration Rate is calculated using  race neutral CKD-EPI 2021 equation per NKF-ASN recommendations.      ABO Grouping Only 07/12/2025 O   Final    Rh Grouping Only 07/12/2025 POS   Final    Antibody Screen-Cod 07/12/2025 NEG   Final       Scheduled Medications[1]     NST-as performed and read by myself; reactive NST without contractions.  See above.    Impression;  IUP AT 26w0d    2.   New complaint of mild nausea, etiology not clear ??PTL ?? GI virus    Plan;  Will continue to observe.  Hyperthermia can be an early sign of both viral infection or chorioamnionitis.  Will obtain white blood cell count today.    My total time spent caring for the patient on the day of the encounter was 40 minutes.   This does not include time spent on separately billable procedures/tests.      Scott Peralta MD  [facility], [facility]  Mobile: [phone]  7/13/2025  [unfilled]         [1]   Scheduled Medications   Medication Dose Frequency    aspirin  81 mg DAILY    prenatal plus vitamin  1 Tablet DAILY

## 2025-07-13 NOTE — CARE PLAN
The patient is Stable - Low risk of patient condition declining or worsening    Shift Goals  Clinical Goals: reassuring FHT  Patient Goals: healthy mom, healthy babies  Family Goals: emotional support    Progress made toward(s) clinical / shift goals:      Problem: Pain  Goal: Patient's pain will be alleviated or reduced to the patient’s comfort goal  Outcome: Progressing  Note: Pt continuously monitored for pain, educated on pain management options. Call light within reach, pt understands to call for RN regarding any needs or concerns.      Problem: Risk for Infection and Impaired Wound Healing  Goal: Patient will remain free from infection  Outcome: Progressing  Note: Pt will remain afebrile, and will show no s/s of infection.

## 2025-07-14 ASSESSMENT — PAIN DESCRIPTION - PAIN TYPE
TYPE: ACUTE PAIN
TYPE: ACUTE PAIN

## 2025-07-14 NOTE — PROGRESS NOTES
1355 Report received from Kimberley RN for lunch break.    1444 Report given back to Kimberley RN. Care relinquished.

## 2025-07-14 NOTE — PROGRESS NOTES
"ANTEPARTUM PROGRESS NOTE;    Brandie Hunter is a 24 y.o. female  at 26w1d.    SUBJECTIVE:  Patient seen and examined. She is doing well and resting comfortably in the room. No significant events overnight. Denies HA, change in vision, RUQ/epigastric pain, SOB, CP, fever, nausea/vomiting, edema or calf pain.    Contractions: Denies  Leaking of fluid: Denies  Vaginal bleeding: denies  Fetal movement: present x 2    Review of systems; denies vaginal bleeding, leakage of fluid, uterine contractions, fever chills or abdominal pain    Past Medical History[1]    Physical examination;  Alert and oriented x3  Gen.-well-developed well-nourished female in no apparent distress  HEENT-normocephalic, nontraumatic,EOMI,PERRLA  /76   Pulse 88   Temp 36.7 °C (98 °F) (Temporal)   Resp 18   Ht 1.575 m (5' 2\")   Wt 71.2 kg (157 lb)   LMP 2025 (Approximate)   SpO2 95%   BMI 28.72 kg/m²   Abdomen-Soft, gravid, nontender  Extremities without cyanosis clubbing or edema  Neurologic grossly intact    Labs:  Lab Results   Component Value Date/Time    WBC 12.9 (H) 2025 04:25 PM    RBC 3.79 (L) 2025 04:25 PM    HEMOGLOBIN 11.9 (L) 2025 04:25 PM    HEMATOCRIT 36.1 (L) 2025 04:25 PM    MCV 95.3 2025 04:25 PM    MCH 31.4 2025 04:25 PM    MCHC 33.0 2025 04:25 PM    MPV 10.1 2025 04:25 PM    NEUTSPOLYS 73.90 (H) 2025 04:25 PM    LYMPHOCYTES 15.00 (L) 2025 04:25 PM    MONOCYTES 7.30 2025 04:25 PM    EOSINOPHILS 0.90 2025 04:25 PM    BASOPHILS 0.50 2025 04:25 PM       Lab Results   Component Value Date/Time    SODIUM 135 2025 07:53 AM    POTASSIUM 3.9 2025 07:53 AM    CHLORIDE 104 2025 07:53 AM    CO2 22 2025 07:53 AM    GLUCOSE 84 2025 07:53 AM    BUN 8 2025 07:53 AM    CALCIUM 8.7 2025 07:53 AM    CREATININE 0.45 (L) 2025 07:53 AM           Scheduled Medications[2]     NST: " "pending    Assessment  IUP AT   Brandie Hunter is a 24 y.o. female  at 26w1d admitted 2025 due to , prelabor rupture of membranes of baby A in a di-di monozygotic twin pregnancy. 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 She is s/p BMZ x 2 (7/3, ) and latency abx. She was consulted by NICU on 7/3/2025 in regards to possible RDS. BP today was 122/76. Interval growth US ordered today and will be done today/tomorrow by [facility].     Plan;  1. Continue observation  2. NST daily, qshift  3. Growth US to be done this week ()   -US 2025:     - Twin A: EFW: 10%, 483 g. MVP: 1.3 cm; FHR: 141 bpm; Breech, maternal left;  Pulmonary hypoplasia    - Twin B: EFW: 35%, 553g; MVP: 3.9; FHR: 163 bpm; Variable presentation; maternal right   - 2025: Doppler studies are fine for both fetuses. I have ordered fetal growth scan sometime this week. Again, the plan is not to intervene on behalf of twin \"A\".  4. S/p BMZ x 2 (7/3, )  5. S/p latency abx   6. S/p social work consult 7/3/2025  7. S/p Nicu consult 7/3/2025  8. GBS negative      Patient was seen in conjunction with Dr. Christo MD [facility] who consulted and agrees with the plan.      Susie Esquivel P.A.-C.  [facility] Service   Obstetrics and Gynecology  2025     9:43 AM         [1]   Past Medical History:  Diagnosis Date    Abdominal discomfort 2022    Anxiety     Bacterial vaginosis 2022    Depression     Head ache     Heart murmur     as a baby    High risk heterosexual behavior 2021    Ingrown toenail 10/6/2020    Irregular periods 2020    Menarche 2014    Nausea 10/6/2020    Swollen lymph nodes 2022    Vaginal discharge 10/28/2020    Weight loss 2020   [2]   Scheduled Medications   Medication Dose Frequency    aspirin  81 mg DAILY    prenatal plus vitamin  1 Tablet DAILY     "

## 2025-07-14 NOTE — CARE PLAN
The patient is Watcher - Medium risk of patient condition declining or worsening    Shift Goals  Clinical Goals: reassuring FHT  Patient Goals: healthy mom, healthy babies  Family Goals: emotional support    Progress made toward(s) clinical / shift goals:    Problem: Risk for Infection and Impaired Wound Healing  Goal: Patient will remain free from infection  Description: Target End Date:  1 to 3 days    1.  Utilize Standard Precautions at all times to reduce the risk of transmission of microorganisms from both recognized and unrecognized sources of infection  2.  Infection prevention handouts provided (general/device/diagnosis specific) and documented in Patient Education  3.  Limit vaginal exams as necessary  4.  Use aseptic technique during vaginal exams  5.  Administer antibiotic therapy per provider order  6.  Assess for removal of lines and drains  7.  Line/drain care per policy and/or provider orders  Outcome: Progressing  Note: Assess maternal vital signs and changes in amniotic fluid for signs and symptoms of infection. Patient remains free from signs of infection.      Problem: Risk for Injury  Goal: Patient and fetus will be free of preventable injury/complications  Description: Target End Date:  Prior to discharge or change in level of care    1.  Monitor uterine activity and if applicable progression of labor  2.  Monitor fetal well being  3.  Assure resuscitative equipment is available and within reach  Outcome: Progressing  Note: EFM X 3 applied. Assess FHT and uterine activity for fetal well being and safety.        Patient is not progressing towards the following goals:

## 2025-07-14 NOTE — PROGRESS NOTES
1900 Report received from Milla CORDON RN. POC discussed. Care assumed.     1910 RN at bedside. POC for the evening discussed. Pt reports + FM, no changes in LOF denies VB and Ucs. Pt requested to be monitored at 2030.      2028 EFM X 3 applied.

## 2025-07-14 NOTE — PROGRESS NOTES
"0700- Report received from Gloria URBAN., POC discussed, care assumed.     1027- TOCO and EFMx2 applied for daily monitoring. Pt reports +Fmx2, no UC, no VB. States that she had no LOF last night but since getting out of bed this morning she has notices a light amount of clear fluid.     1226- Pt ambulating off unit to go \"read out in the healing garden.\"    1345- Pt back in room. Denies any needs at this time.     1900- Report given to Flaca URBAN., & Rochelle URBAN., POC discussed, care relinquished. Pt ambulated off unit with family to go listen to the music event in the healing garden.   "

## 2025-07-15 ENCOUNTER — APPOINTMENT (OUTPATIENT)
Dept: RADIOLOGY | Facility: MEDICAL CENTER | Age: 25
End: 2025-07-15
Attending: OBSTETRICS & GYNECOLOGY
Payer: COMMERCIAL

## 2025-07-15 ASSESSMENT — PAIN DESCRIPTION - PAIN TYPE
TYPE: ACUTE PAIN
TYPE: ACUTE PAIN

## 2025-07-15 NOTE — PROGRESS NOTES
1900- Report received from Kimberley URBAN. Pt in room with family in stable condition. Care assumed at this time.     0700- Report given to Curly URBAN. Pt sleeping comfortably in stable condition care relinquished at this time.

## 2025-07-15 NOTE — PROGRESS NOTES
"[facility] ANTEPARTUM PROGRESS NOTE;    Brandie Hunter is a 24 y.o. female  at 26w2d.    Patient Active Problem List    Diagnosis Date Noted    Dichorionic diamniotic twin pregnancy in second trimester 2025    Premature rupture of membranes in second trimester 2025    Other chest pain 10/03/2024    Shortness of breath 10/03/2024    Bradycardia 10/03/2024    Dyspepsia 2023    Muscle spasm 10/30/2023    Tension headache 10/30/2023    Gastroesophageal reflux disease without esophagitis 2022    Lymph node enlargement 2022    Environmental and seasonal allergies 2021    Hair loss 2020    Nausea 10/06/2020    Abnormal WBC count 2020    Anxiety 2020    Current moderate episode of major depressive disorder (HCC) 2020         SUBJECTIVE:  Patient seen and examined by myself and Dr. Villafuerte. She is doing well this morning. No significant events overnight . Denies HA, change in vision, RUQ/epigastric pain, SOB, CP, fever, nausea/vomiting, edema or calf pain.     Contractions: Denies  Leaking of fluid: Present  Vaginal bleeding: Denies  Fetal movement: Present x2    Review of systems; denies fevers, chills, headaches, vision changes, dizziness, or abdominal pain      Physical examination;  /61   Pulse 98   Temp 35.8 °C (96.5 °F) (Temporal)   Resp 12   Ht 1.575 m (5' 2\")   Wt 71.2 kg (157 lb)   LMP 2025 (Approximate)   SpO2 95%   BMI 28.72 kg/m²   Appearance/Psychiatric: appears well and in NAD  Constitutional: The patient is well nourished.  Respiratory: Her respiratory effort is normal.  Gastrointestinal: Soft, gravid, non-tender  Extremities: no edema    NST-pending    Assessment  IUP AT 26w2d   Ms. Hunter is a 24 y.o. year old female at 26w2d admitted on 2025 due to , prelabor rupture of membranes of baby A in a di-di monozygotic twin pregnancy. 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 She " is s/p BMZ x 2 (7/3, 7/4) and latency abx. She was consulted by NICU on 7/3/2025 in regards to possible RDS. BP today was 122/76. Interval growth US will be done today by [facility].     Plan;  1. Continue observation  2. NST daily, qshift  3. Growth US to be done today  4. S/p BMZ x 2 (7/3, 7/4)  5. S/p latency abx   6. S/p social work consult 7/3/2025  7. S/p Nicu consult 7/3/2025  8. GBS negative.  9. Inpatient management till delivery ( 34 weeks).      MORIAH Negron  [facility] Service   Obstetrics and Gynecology  7/15/2025     9:23 AM      Patient was seen in conjunction with Dr. Villafuerte who consulted and agrees with the plan.

## 2025-07-15 NOTE — CARE PLAN
The patient is Stable - Low risk of patient condition declining or worsening    Shift Goals  Clinical Goals: Stable reassuring FHT for both babies.  Patient Goals: Rest to support delayed delivery.  Family Goals: Support    Progress made toward(s) clinical / shift goals:    Problem: Psychosocial - L&D  Goal: Patient will be able to discuss coping skills during hospitalization  Outcome: Progressing  POC discussed     Problem: Risk for Venous Thromboembolism (VTE)  Goal: VTE prevention measures will be implemented and patient will remain free from VTE  Outcome: Progressing  Pt ambulating

## 2025-07-15 NOTE — CARE PLAN
The patient is Watcher - Medium risk of patient condition declining or worsening    Shift Goals  Clinical Goals: Stable reassuring FHT for both babies.  Patient Goals: Rest to support delayed delivery.  Family Goals: Support    Progress made toward(s) clinical / shift goals:  Progressing  Problem: Risk for Excess Fluid Volume  Goal: Patient will demonstrate pulse, blood pressure and neurologic signs within expected ranges and without any respiratory complications  Outcome: Progressing     Problem: Psychosocial - L&D  Goal: Patient's level of anxiety will decrease  Outcome: Progressing  Goal: Patient will be able to discuss coping skills during hospitalization  Outcome: Progressing  Goal: Patient's ability to re-evaluate and adapt role responsibilities will improve  Outcome: Progressing  Goal: Spiritual and cultural needs incorporated into hospitalization  Outcome: Progressing     Problem: Cardiac Output  Goal: Patient will remain normotensive throughout hospitalization  Outcome: Progressing     Problem: Pain  Goal: Patient's pain will be alleviated or reduced to the patient’s comfort goal  Outcome: Progressing  Note: Pain assessed hourly with appropriate scale.      Problem: Risk for Fluid Imbalance  Goal: Patient's fluid volume balance will be maintained or improve  Outcome: Progressing     Problem: Risk for Infection and Impaired Wound Healing  Goal: Patient's wound/surgical incision will decrease in size and heals properly  Outcome: Progressing     Problem: Risk for Venous Thromboembolism (VTE)  Goal: VTE prevention measures will be implemented and patient will remain free from VTE  Outcome: Progressing  Note: VTE education provided.   Pt ambulatory and encouraged to ambulate frequently.      Problem: Discharge Barriers/Planning  Goal: Patient's continuum of care needs are met  Outcome: Progressing       Patient is not progressing towards the following goals:

## 2025-07-15 NOTE — CARE PLAN
The patient is Watcher - Medium risk of patient condition declining or worsening    Shift Goals  Clinical Goals: reasurring FHT x2  Patient Goals: rest; healthy babies  Family Goals: support    Progress made toward(s) clinical / shift goals:    Problem: Knowledge Deficit - L&D  Goal: Patient and family/caregivers will demonstrate understanding of plan of care, disease process/condition, diagnostic tests and medications  Outcome: Met     Problem: Risk for Infection and Impaired Wound Healing  Goal: Patient will remain free from infection  Outcome: Met  Note: Patient has remained afebrile this shift; has had scant amount of clear vaginal fluid out today.      Problem: Risk for Injury  Goal: Patient and fetus will be free of preventable injury/complications  Outcome: Met  Note: Monitoring of uterine activity and fetal well being x2. Appropriate FHT for gestational age.        Patient is not progressing towards the following goals: n/a

## 2025-07-15 NOTE — PROGRESS NOTES
0700: Report received from Rochelle URBAN, care assumed.    0825: Dr Peralta at bedside. US orders received. Pt denies ctx, , bleeding, reports + Fmx2, reports no changes in LOF.    1855: Report given to Rochelle URBAN, care relinquished.

## 2025-07-16 ENCOUNTER — ANCILLARY PROCEDURE (OUTPATIENT)
Dept: OBGYN | Facility: MEDICAL CENTER | Age: 25
End: 2025-07-16
Attending: OBSTETRICS & GYNECOLOGY
Payer: COMMERCIAL

## 2025-07-16 ASSESSMENT — PAIN DESCRIPTION - PAIN TYPE: TYPE: ACUTE PAIN

## 2025-07-16 NOTE — PROGRESS NOTES
"ANTEPARTUM PROGRESS NOTE;    Brandie Hunter is a 24 y.o. female  at 26w3d.    SUBJECTIVE:  Patient seen and examined by myself and Dr. Villafuerte.  She is doing well this morning and laying comfortably in bed. No significant events overnight. Denies HA, change in vision, RUQ/epigastric pain, SOB, CP, fever, nausea/vomiting, edema or calf pain.    Contractions: Denies  Leaking of fluid: Present  Vaginal bleeding: Denies  Fetal movement: present x 2    Review of systems; denies vaginal bleeding, leakage of fluid, uterine contractions, fever chills or abdominal pain    Past Medical History[1]    Physical examination;  Alert and oriented x3  Gen.-well-developed well-nourished female in no apparent distress  HEENT-normocephalic, nontraumatic,EOMI,PERRLA  /68   Pulse 79   Temp 36.9 °C (98.5 °F) (Temporal)   Resp 16   Ht 1.575 m (5' 2\")   Wt 71.2 kg (157 lb)   LMP 2025 (Approximate)   SpO2 96%   BMI 28.72 kg/m²   Abdomen-Soft, gravid, nontender  Extremities without cyanosis clubbing or edema  Neurologic grossly intact    Labs:  Lab Results   Component Value Date/Time    WBC 12.9 (H) 2025 04:25 PM    RBC 3.79 (L) 2025 04:25 PM    HEMOGLOBIN 11.9 (L) 2025 04:25 PM    HEMATOCRIT 36.1 (L) 2025 04:25 PM    MCV 95.3 2025 04:25 PM    MCH 31.4 2025 04:25 PM    MCHC 33.0 2025 04:25 PM    MPV 10.1 2025 04:25 PM    NEUTSPOLYS 73.90 (H) 2025 04:25 PM    LYMPHOCYTES 15.00 (L) 2025 04:25 PM    MONOCYTES 7.30 2025 04:25 PM    EOSINOPHILS 0.90 2025 04:25 PM    BASOPHILS 0.50 2025 04:25 PM       Lab Results   Component Value Date/Time    SODIUM 135 2025 07:53 AM    POTASSIUM 3.9 2025 07:53 AM    CHLORIDE 104 2025 07:53 AM    CO2 22 2025 07:53 AM    GLUCOSE 84 2025 07:53 AM    BUN 8 2025 07:53 AM    CALCIUM 8.7 2025 07:53 AM    CREATININE 0.45 (L) 2025 07:53 AM           Scheduled " Medications[2]     NST: pending      Assessment  IUP AT   Brandie Hunter is a 24 y.o. female  at 26w3d admitted 2025 due to , prelabor rupture of membranes of baby A in a di-di monozygotic twin pregnancy. 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 She is s/p BMZ x 2 (7/3, ) and latency abx. She was consulted by NICU on 7/3/2025 in regards to possible RDS. Pt states she was seen by NICU yesterday, however a note was not seen in her chart.  BP today was 131/68. Interval growth US will be done today by [facility].     Plan;  1. Continue observation  2. NST daily, qshift  3. Growth US to be done this week ()             - US 7/15/2025:     - Twin A: Breech lie; DOLLY: 3.42; FHR: 147 bpm; EFW: 35%, 902.2g    - Twin B: Vertex lie; DOLLY: 3.58; FHR: 146 bpm; EFW: 36%, 908.6g   -Due to the increase in growth of A from last US will intervene with twin A.    -US 2025:                           - Twin A: EFW: 10%, 900 g. MVP: 3.1 cm; FHR: 141 bpm; Breech, maternal left;  Pulmonary hypoplasia                          - Twin B: EFW: 35%, 908 g; MVP: 3.9; FHR: 163 bpm; Variable presentation; maternal right                4. S/p BMZ x 2 (7/3, )  5. S/p latency abx   6. S/p social work consult 7/3/2025  7. S/p Nicu consult 7/3/2025. Pt states had consult with NICU 7/15/2025. I did not see a note in the chart. If for any reason a consult needs to be placed the nurse will reach out to me.  8. GBS negative       Patient was seen in conjunction with Dr. Christo MD [facility] who consulted and agrees with the plan.      Susie Esquivel P.A.-C.  [facility] Service   Obstetrics and Gynecology  2025     8:35 AM         [1]   Past Medical History:  Diagnosis Date    Abdominal discomfort 2022    Anxiety     Bacterial vaginosis 2022    Depression     Head ache     Heart murmur     as a baby    High risk heterosexual behavior 2021    Ingrown  toenail 10/6/2020    Irregular periods 12/21/2020    Menarche 8/9/2014    Nausea 10/6/2020    Swollen lymph nodes 1/12/2022    Vaginal discharge 10/28/2020    Weight loss 12/21/2020   [2]   Scheduled Medications   Medication Dose Frequency    aspirin  81 mg DAILY    prenatal plus vitamin  1 Tablet DAILY

## 2025-07-16 NOTE — PROGRESS NOTES
0700: Report received from Sanjay RN, care assumed.    1031: US orders received from Dr Alvarado.    1145: Pt denies ctx, LOF, bleeding, reports + Fmx2.    1900: Report given to Leonie KIM RN, care relinquished.

## 2025-07-16 NOTE — CARE PLAN
The patient is Stable - Low risk of patient condition declining or worsening    Shift Goals  Clinical Goals: Stable reassuring FHT for both babies  Patient Goals: Rest to support delayed delivery  Family Goals: Support    Progress made toward(s) clinical / shift goals:    Problem: Psychosocial - L&D  Goal: Patient's level of anxiety will decrease  Outcome: Progressing  POC discussed     Problem: Risk for Venous Thromboembolism (VTE)  Goal: VTE prevention measures will be implemented and patient will remain free from VTE  Outcome: Progressing  Pt ambulatory

## 2025-07-16 NOTE — CARE PLAN
The patient is Watcher - Medium risk of patient condition declining or worsening    Shift Goals  Clinical Goals: Stable reassuring FHT for both babies  Patient Goals: Rest to support delayed delivery  Family Goals: Support    Progress made toward(s) clinical / shift goals:  Progressing    Patient is not progressing towards the following goals: N/A  Problem: Risk for Excess Fluid Volume  Goal: Patient will demonstrate pulse, blood pressure and neurologic signs within expected ranges and without any respiratory complications  Outcome: Progressing     Problem: Psychosocial - L&D  Goal: Patient's level of anxiety will decrease  Outcome: Progressing  Goal: Patient will be able to discuss coping skills during hospitalization  Outcome: Progressing  Goal: Patient's ability to re-evaluate and adapt role responsibilities will improve  Outcome: Progressing  Goal: Spiritual and cultural needs incorporated into hospitalization  Outcome: Progressing     Problem: Cardiac Output  Goal: Patient will remain normotensive throughout hospitalization  Outcome: Progressing     Problem: Pain  Goal: Patient's pain will be alleviated or reduced to the patient’s comfort goal  Outcome: Progressing  Note: Pain assessed per unit standard with appropriate scale.      Problem: Risk for Fluid Imbalance  Goal: Patient's fluid volume balance will be maintained or improve  Outcome: Progressing     Problem: Risk for Infection and Impaired Wound Healing  Goal: Patient's wound/surgical incision will decrease in size and heals properly  Outcome: Progressing     Problem: Risk for Venous Thromboembolism (VTE)  Goal: VTE prevention measures will be implemented and patient will remain free from VTE  Outcome: Progressing  Note: VTE education provided.   Pt ambulatory and encouraged to take walks.      Problem: Discharge Barriers/Planning  Goal: Patient's continuum of care needs are met  Outcome: Progressing

## 2025-07-16 NOTE — PROGRESS NOTES
1900- Report received from Curly URBAN. Pt resting comfortably in stable condition. Care assumed at this time.     0700- Report given to Curly CARRASCO Pt sleeping comfortably in stable condition. Care relinquished at this time.

## 2025-07-17 ASSESSMENT — PAIN DESCRIPTION - PAIN TYPE: TYPE: ACUTE PAIN

## 2025-07-17 NOTE — PROGRESS NOTES
"ANTEPARTUM PROGRESS NOTE;    Brandie Hunter is a 24 y.o. female  at 26w4d.    SUBJECTIVE:  Patient seen and examined. She is doing well this morning and currently does not have any concerns.. No significant events overnight. Denies HA, change in vision, RUQ/epigastric pain, SOB, CP, fever, nausea/vomiting, edema or calf pain. She does note that she spoke with NICU a few nights ago, but she didn't have any questions and it was late so does not believe she had a formal consult.     Contractions: denies  Leaking of fluid: denies  Vaginal bleeding: denies  Fetal movement: present x 2    Review of systems; denies vaginal bleeding, leakage of fluid, uterine contractions, fever chills or abdominal pain    Past Medical History[1]    Physical examination;  Alert and oriented x3  Gen.-well-developed well-nourished female in no apparent distress  HEENT-normocephalic, nontraumatic,EOMI,PERRLA  /80   Pulse 79   Temp 36.3 °C (97.4 °F) (Temporal)   Resp 16   Ht 1.575 m (5' 2\")   Wt 71.2 kg (157 lb)   LMP 2025 (Approximate)   SpO2 99%   BMI 28.72 kg/m²   Abdomen-Soft, gravid, nontender  Extremities without cyanosis clubbing or edema  Neurologic grossly intact    Labs:  Lab Results   Component Value Date/Time    WBC 12.9 (H) 2025 04:25 PM    RBC 3.79 (L) 2025 04:25 PM    HEMOGLOBIN 11.9 (L) 2025 04:25 PM    HEMATOCRIT 36.1 (L) 2025 04:25 PM    MCV 95.3 2025 04:25 PM    MCH 31.4 2025 04:25 PM    MCHC 33.0 2025 04:25 PM    MPV 10.1 2025 04:25 PM    NEUTSPOLYS 73.90 (H) 2025 04:25 PM    LYMPHOCYTES 15.00 (L) 2025 04:25 PM    MONOCYTES 7.30 2025 04:25 PM    EOSINOPHILS 0.90 2025 04:25 PM    BASOPHILS 0.50 2025 04:25 PM       Lab Results   Component Value Date/Time    SODIUM 135 2025 07:53 AM    POTASSIUM 3.9 2025 07:53 AM    CHLORIDE 104 2025 07:53 AM    CO2 22 2025 07:53 AM    GLUCOSE 84 2025 07:53 " AM    BUN 8 2025 07:53 AM    CALCIUM 8.7 2025 07:53 AM    CREATININE 0.45 (L) 2025 07:53 AM           Scheduled Medications[2]     NST: pending      Assessment  IUP AT   Brandie Hunter is a 24 y.o. female  at 26w4d admitted 2025 due to , prelabor rupture of membranes of baby A in a di-di monozygotic twin pregnancy. 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 She is s/p BMZ x 2 (7/3, ) and latency abx. She was consulted by NICU on 7/3/2025 in regards to possible RDS. Pt states that she believes NICU came a few days ago. She states at that time she did not have any questions. At this time I do not see a note in her chart in regards to the consult. Will attempt to get additional consult to discuss changes in interventions for twin A.  BP today was 124/80.     Plan;  1. Continue observation  2. NST daily, qshift  3. Growth US to be done this week ()             - US 2025:                           - Twin A: EFW: <1%; 642g; Breech maternal left; MVP: 1.7; UA s/d ratio: 3.19                          - Twin B: EFW: 20%, 865g; Transverse maternal right; MVP: 4.4; UA s/d ratio: 2.88  -Due to the increase in growth of A from last US will intervene with twin A.    -US 2025:                           - Twin A: EFW: 10%, 900 g. MVP: 3.1 cm; FHR: 141 bpm; Breech, maternal left;  Pulmonary hypoplasia                          - Twin B: EFW: 35%, 908 g; MVP: 3.9; FHR: 163 bpm; Variable presentation; maternal right                4. S/p BMZ x 2 (7/3, )  5. S/p latency abx   6. S/p social work consult 7/3/2025  7. S/p Nicu consult 7/3/2025. Pt states had consult with NICU 7/15/2025. I did not see a note in the chart. If for any reason a consult needs to be placed the nurse will reach out to me.  8. GBS negative    Patient was seen in conjunction with Dr. Christo MD [facility] who consulted and agrees with the plan.      Susie Esquivel,  P.A.-C.  [facility]   Obstetrics and Gynecology  7/17/2025     9:32 AM         [1]   Past Medical History:  Diagnosis Date    Abdominal discomfort 1/12/2022    Anxiety     Bacterial vaginosis 1/12/2022    Depression     Head ache     Heart murmur     as a baby    High risk heterosexual behavior 12/22/2021    Ingrown toenail 10/6/2020    Irregular periods 12/21/2020    Menarche 8/9/2014    Nausea 10/6/2020    Swollen lymph nodes 1/12/2022    Vaginal discharge 10/28/2020    Weight loss 12/21/2020   [2]   Scheduled Medications   Medication Dose Frequency    aspirin  81 mg DAILY    prenatal plus vitamin  1 Tablet DAILY

## 2025-07-17 NOTE — CARE PLAN
The patient is Stable - Low risk of patient condition declining or worsening    Shift Goals  Clinical Goals: Reactive FHTs  Patient Goals: Emotional support, rest, education  Family Goals: Support    Progress made toward(s) clinical / shift goals:  Progressing    Problem: Risk for Infection and Impaired Wound Healing  Goal: Patient's wound/surgical incision will decrease in size and heals properly  Outcome: Progressing  Note: Q4h vitals.      Problem: Risk for Injury  Goal: Patient and fetus will be free of preventable injury/complications  Outcome: Progressing  Note: 1hr FHT & uterine monitoring

## 2025-07-17 NOTE — CARE PLAN
The patient is Watcher - Medium risk of patient condition declining or worsening    Shift Goals  Clinical Goals: continue to monitoring NST and rest  Patient Goals: healthy mom and babies  Family Goals: support    Progress made toward(s) clinical / shift goals:  progressing    Problem: Pain  Goal: Patient's pain will be alleviated or reduced to the patient’s comfort goal  Outcome: Progressing  Note: Educated on use of call light     Problem: Risk for Injury  Goal: Patient and fetus will be free of preventable injury/complications  Outcome: Progressing  Note: VS WNL, continue fetal monitoring and VS monitoring

## 2025-07-18 NOTE — PROGRESS NOTES
"[facility] ANTEPARTUM PROGRESS NOTE;    Brandie Hunter is a 24 y.o. female  at 26 w5 d, with Di/DI twin gestation. TW- A with PROM at 13 weeks of gestation.    Patient Active Problem List    Diagnosis Date Noted    Dichorionic diamniotic twin pregnancy in second trimester 2025    Premature rupture of membranes in second trimester 2025    Other chest pain 10/03/2024    Shortness of breath 10/03/2024    Bradycardia 10/03/2024    Dyspepsia 2023    Muscle spasm 10/30/2023    Tension headache 10/30/2023    Gastroesophageal reflux disease without esophagitis 2022    Lymph node enlargement 2022    Environmental and seasonal allergies 2021    Hair loss 2020    Nausea 10/06/2020    Abnormal WBC count 2020    Anxiety 2020    Current moderate episode of major depressive disorder (HCC) 2020         SUBJECTIVE:  Patient seen and examined by myself and Dr. Villafuerte. She is doing well this morning. No significant events overnight . Denies HA, change in vision, RUQ/epigastric pain, SOB, CP, fever, nausea/vomiting, edema or calf pain.Continues to have off and on leaking of fluid.    Contractions: Denies  Leaking of fluid: Present  Vaginal bleeding: Denies  Fetal movement: Present     Review of systems; denies fevers, chills, headaches, vision changes, dizziness, or abdominal pain      Physical examination;  /57   Pulse 98   Temp 36.2 °C (97.2 °F) (Temporal)   Resp 16   Ht 1.575 m (5' 2\")   Wt 71.2 kg (157 lb)   LMP 2025 (Approximate)   SpO2 98%   BMI 28.72 kg/m²   Appearance/Psychiatric: appears well and in NAD  Constitutional: The patient is well nourished.  Respiratory: Her respiratory effort is normal.  Gastrointestinal: Soft, gravid, non-tender  Extremities: no edema    Lab Results   Component Value Date/Time    SODIUM 135 2025 07:53 AM    POTASSIUM 3.9 2025 07:53 AM    CHLORIDE 104 2025 07:53 AM    CO2 22 2025 07:53 AM    " GLUCOSE 84 2025 07:53 AM    BUN 8 2025 07:53 AM    CREATININE 0.45 (L) 2025 07:53 AM      Lab Results   Component Value Date/Time    WBC 12.9 (H) 2025 04:25 PM    RBC 3.79 (L) 2025 04:25 PM    HEMOGLOBIN 11.9 (L) 2025 04:25 PM    HEMATOCRIT 36.1 (L) 2025 04:25 PM    MCV 95.3 2025 04:25 PM    MCH 31.4 2025 04:25 PM    MCHC 33.0 2025 04:25 PM    MPV 10.1 2025 04:25 PM    NEUTSPOLYS 73.90 (H) 2025 04:25 PM    LYMPHOCYTES 15.00 (L) 2025 04:25 PM    MONOCYTES 7.30 2025 04:25 PM    EOSINOPHILS 0.90 2025 04:25 PM    BASOPHILS 0.50 2025 04:25 PM          NST-pending    Assessment  IUP AT 26w5d   Ms. Hunter is a 24 y.o. year old female at 26w5d admitted on  2025 due to , prelabor rupture of membranes of baby A in a di-di monozygotic twin pregnancy. 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 She is s/p BMZ x 2 (7/3, ) and latency abx. She was consulted by NICU on 7/3/2025 in regards to possible RDS. She states at that time she did not have any questions. Will attempt to get additional consult to discuss changes in interventions for twin A. BP today was 119/57.      Plan:  1. Continue observation  2. NST q shift    3. Growth US to be done this week ()             - US 2025,done by [facility].                          - Twin A: EFW: <1%; 642g; Breech maternal left; MVP: 1.7; UA s/d ratio: 3.19                          - Twin B: EFW: 20%, 865g; Transverse maternal right; MVP: 4.4; UA s/d ratio: 2.88  -Due to the increase in growth of A from last US will intervene with twin A.    -US 25 by hospital                          - Twin A: EFW: 10%, 900 g. MVP: 3.1 cm; FHR: 141 bpm; Breech, maternal left;  Pulmonary hypoplasia                          - Twin B: EFW: 35%, 908 g; MVP: 3.9; FHR: 163 bpm; Variable presentation; maternal right                4. S/p BMZ x 2 (7/3,  7/4)  5. S/p latency abx   6. S/p social work consult 7/3/2025  7. S/p Nicu consult 7/3/2025. Pt states had consult with NICU 7/15/2025. I did not see a note in the chart. If for any reason a consult needs to be placed the nurse will reach out to me.  8. GBS negative.  9. Request NICU to come and talk o patient again.      KARINA NegronPNingRNingN.  [facility]   Obstetrics and Gynecology  7/18/2025     9:55 AM      Patient was seen in conjunction with Dr. Christo MD [facility] who consulted and agrees with the plan.

## 2025-07-18 NOTE — CARE PLAN
The patient is Stable - Low risk of patient condition declining or worsening    Shift Goals  Clinical Goals: Reactive FHTs, Emotional support  Patient Goals: Healthy mom, Healthy babies, Education  Family Goals: Support    Progress made toward(s) clinical / shift goals:  Progressing    Problem: Psychosocial - L&D  Goal: Patient will be able to discuss coping skills during hospitalization  Outcome: Progressing  Note: Pt will demonstrate acceptance of hospitalization and assimilate to productive lifestyle while inpatient.     Problem: Risk for Injury  Goal: Patient and fetus will be free of preventable injury/complications  Outcome: Progressing  Note: 1 hr FHT and uterine

## 2025-07-18 NOTE — PROGRESS NOTES
0700: Report from RAJNI Byrd.    1048: MD Nick notified of deceleration in baby B FHT. Orders to extend fetal heart monitoring.     1900: Report given to RAJNI Thayer.

## 2025-07-18 NOTE — CARE PLAN
The patient is Watcher - Medium risk of patient condition declining or worsening    Shift Goals  Clinical Goals: Stable reassuring FHT for both babies.  Patient Goals: Emotional Support, Rest  Family Goals: Support    Progress made toward(s) clinical / shift goals:  Progressing    Problem: Risk for Excess Fluid Volume  Goal: Patient will demonstrate pulse, blood pressure and neurologic signs within expected ranges and without any respiratory complications  Outcome: Progressing     Problem: Psychosocial - L&D  Goal: Patient's level of anxiety will decrease  Outcome: Progressing  Goal: Patient will be able to discuss coping skills during hospitalization  Outcome: Progressing  Goal: Patient's ability to re-evaluate and adapt role responsibilities will improve  Outcome: Progressing  Goal: Spiritual and cultural needs incorporated into hospitalization  Outcome: Progressing     Problem: Cardiac Output  Goal: Patient will remain normotensive throughout hospitalization  Outcome: Progressing     Problem: Pain  Goal: Patient's pain will be alleviated or reduced to the patient’s comfort goal  Outcome: Progressing  Note: Pain assessed per unit standard utilizing appropriate scale.   Pt educated on pain mgmt options if pain occurs.      Problem: Risk for Fluid Imbalance  Goal: Patient's fluid volume balance will be maintained or improve  Outcome: Progressing     Problem: Risk for Infection and Impaired Wound Healing  Goal: Patient's wound/surgical incision will decrease in size and heals properly  Outcome: Progressing     Problem: Risk for Venous Thromboembolism (VTE)  Goal: VTE prevention measures will be implemented and patient will remain free from VTE  Outcome: Progressing  Note: VTE education provided.   Pt ambulatory and encouraged to take walks.      Problem: Discharge Barriers/Planning  Goal: Patient's continuum of care needs are met  Outcome: Progressing     Problem: Risk for Injury  Goal: Patient and fetus will be free  of preventable injury/complications  Outcome: Progressing

## 2025-07-18 NOTE — PROGRESS NOTES
1900- Report received from Leonie URBAN. Pt resting comfortably in stable condition. Care assumed at this time.     0700- Report given to Leonie CARRASCO Pt sleeping comfortably in stable condition. Care relinquished at this time.

## 2025-07-19 NOTE — PROGRESS NOTES
1900: Received report from RAJNI Hadley. POC discussed.     2109: Pt requesting to discuss POC with MD.     2130: Dr. Romero at bedside, discussed POC with pt.    0351: Requested Dr. Romero to review the FHTs. Tracing reviewed by MD.     0700: Report given to RAJNI Weaver. POC discussed.

## 2025-07-19 NOTE — PROGRESS NOTES
0135 Report received from Flaca JONES RN. Care assumed during lunch break.     0205 Report given to Flaca JONES RN. Care relinquished.

## 2025-07-19 NOTE — PROGRESS NOTES
Patient and I have had multiple conversations over the last 2h about her goals of care. She has been admitted since 2025 (at which time she was 24w3d), for PPROM of twin A since ~13w gestation. She was first evaluated/consulted at [facility] and found to have anhydramnios of twin A at 14w and was counseled about the likely compromise of twin A's lung development and concern for severe pulmonary hypoplasia. She made is clear at the time of this admission, that, due to the long term health risks for Twin A as a result of pulmonary hypoplasia, that she does NOT desire intervention (emergent delivery via  section) in the event that Twin A shows signs of distress. She understands that in this setting, if delivery is not performed if Twin A demonstrates signs of distress, that there is a significant likelihood that Twin A might die in utero and that life-saving interventions for twin A's behalf would not be available. We also discussed that in the event that intervention is not pursued and Twin A does die in utero, delivery would then be recommended for Twin B. As such, the benefit gained from trying to prolong pregnancy in event of Twin A's distress, may be a matter of hours or perhaps, at most, days. She expressed understanding of these discussion points and repeated them back to me. She had time to discuss them with her partner. She again echoed her preference that intervention/emergent delivery NOT be performed in the event of signs of distress from Twin A, if Twin B remains reassuring on fetal monitoring.     Annie Romero MD MPH      Called to room at 2130 to review POC with patient. She and her partner have re-evaluated the above discussion and those which they had with Dr. Martin and Dr. Armstrong at time of admission. After discussion, they have decided that they would like to pursue full intervention/resuscitation/delivery in event that EITHER twin (A OR B) begins to show signs of distress,  understanding that would likely involve urgent/emergent  delivery of both twins and prolonged NICU stay. They have previously been counseled by Dr. Armstrong about the risks to both babies of  delivery including, but not limited to, respiratory distress, possible need for mechanical ventilation intraventricular hemorrhage, risk for neurodevelopmental delay, retinopathy, etc. They expressed appropriate understanding of this again today. All questions answered.     Annie Romero MD MPH

## 2025-07-19 NOTE — PROGRESS NOTES
"M ANTEPARTUM PROGRESS NOTE;     Brandie Hunter is a 24 y.o. female  at 26 w5 d, with Di/DI twin gestation. TW- A with PROM at 13 weeks of gestation. Reviewed 's not. Appreciate good detail note about patient's desire to Intervene for fetal distress and wanting to every thing possible for both twins. She was ocassional mild variable deceleration last night. FHR loof reassuring this morning. No Ucs.          Patient Active Problem List     Diagnosis Date Noted    Dichorionic diamniotic twin pregnancy in second trimester 2025    Premature rupture of membranes in second trimester 2025    Other chest pain 10/03/2024    Shortness of breath 10/03/2024    Bradycardia 10/03/2024    Dyspepsia 2023    Muscle spasm 10/30/2023    Tension headache 10/30/2023    Gastroesophageal reflux disease without esophagitis 2022    Lymph node enlargement 2022    Environmental and seasonal allergies 2021    Hair loss 2020    Nausea 10/06/2020    Abnormal WBC count 2020    Anxiety 2020    Current moderate episode of major depressive disorder (HCC) 2020            SUBJECTIVE:  Patient seen and examined by myself.She is doing well this morning. No significant events overnight . Denies HA, change in vision, RUQ/epigastric pain, SOB, CP, fever, nausea/vomiting, edema or calf pain.Continues to have off and on leaking of fluid.     Contractions: Denies  Leaking of fluid: Present  Vaginal bleeding: Denies  Fetal movement: Present      Review of systems; denies fevers, chills, headaches, vision changes, dizziness, or abdominal pain        Physical examination;  /57   Pulse 98   Temp 36.2 °C (97.2 °F) (Temporal)   Resp 16   Ht 1.575 m (5' 2\")   Wt 71.2 kg (157 lb)   LMP 2025 (Approximate)   SpO2 98%   BMI 28.72 kg/m²   Appearance/Psychiatric: appears well and in NAD  Constitutional: The patient is well nourished.  Respiratory: Her respiratory effort is " normal.  Gastrointestinal: Soft, gravid, non-tender  Extremities: no edema           Lab Results   Component Value Date/Time     SODIUM 135 2025 07:53 AM     POTASSIUM 3.9 2025 07:53 AM     CHLORIDE 104 2025 07:53 AM     CO2 22 2025 07:53 AM     GLUCOSE 84 2025 07:53 AM     BUN 8 2025 07:53 AM     CREATININE 0.45 (L) 2025 07:53 AM            Lab Results   Component Value Date/Time     WBC 12.9 (H) 2025 04:25 PM     RBC 3.79 (L) 2025 04:25 PM     HEMOGLOBIN 11.9 (L) 2025 04:25 PM     HEMATOCRIT 36.1 (L) 2025 04:25 PM     MCV 95.3 2025 04:25 PM     MCH 31.4 2025 04:25 PM     MCHC 33.0 2025 04:25 PM     MPV 10.1 2025 04:25 PM     NEUTSPOLYS 73.90 (H) 2025 04:25 PM     LYMPHOCYTES 15.00 (L) 2025 04:25 PM     MONOCYTES 7.30 2025 04:25 PM     EOSINOPHILS 0.90 2025 04:25 PM     BASOPHILS 0.50 2025 04:25 PM            NST-pending     Assessment  IUP AT 26w5d   Ms. Hunter is a 24 y.o. year old female at 26w5d admitted on  2025 due to , prelabor rupture of membranes of baby A in a di-di monozygotic twin pregnancy. 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 She is s/p BMZ x 2 (7/3, ) and latency abx. She was consulted by NICU on 7/3/2025 in regards to possible RDS. She states at that time she did not have any questions. Will attempt to get additional consult to discuss changes in interventions for twin A. BP today was 119/57.       Plan:  1. Continue observation  2. NST q shift    3. Growth US to be done this week ()             - US 2025,done by [facility].                          - Twin A: EFW: <1%; 642g; Breech maternal left; MVP: 1.7; UA s/d ratio: 3.19                          - Twin B: EFW: 20%, 865g; Transverse maternal right; MVP: 4.4; UA s/d ratio: 2.88  -Due to the increase in growth of A from last US will intervene with twin A.    -US  7/16/25 by hospital                          - Twin A: EFW: 10%, 900 g. MVP: 3.1 cm; FHR: 141 bpm; Breech, maternal left;  Pulmonary hypoplasia                          - Twin B: EFW: 35%, 908 g; MVP: 3.9; FHR: 163 bpm; Variable presentation; maternal right                4. S/p BMZ x 2 (7/3, 7/4)  5. S/p latency abx   6. S/p social work consult 7/3/2025  7. S/p Nicu consult 7/3/2025. Pt states had consult with NICU 7/15/2025. I did not see a note in the chart. If for any reason a consult needs to be placed the nurse will reach out to me.  8. GBS negative.  9. Request NICU to come and talk o patient again.  10. Recommend FHR monitoring q shift for 1 hr., unless any evidence of non reassuring FHR pattern.  11. SCD for DVT prophylaxis.

## 2025-07-19 NOTE — CARE PLAN
Problem: Risk for Excess Fluid Volume  Goal: Patient will demonstrate pulse, blood pressure and neurologic signs within expected ranges and without any respiratory complications  Outcome: Progressing     Problem: Psychosocial - L&D  Goal: Patient's level of anxiety will decrease  Outcome: Progressing  Goal: Patient will be able to discuss coping skills during hospitalization  Outcome: Progressing  Goal: Patient's ability to re-evaluate and adapt role responsibilities will improve  Outcome: Progressing  Goal: Spiritual and cultural needs incorporated into hospitalization  Outcome: Progressing     Problem: Cardiac Output  Goal: Patient will remain normotensive throughout hospitalization  Outcome: Progressing     Problem: Pain  Goal: Patient's pain will be alleviated or reduced to the patient’s comfort goal  Outcome: Progressing     Problem: Risk for Fluid Imbalance  Goal: Patient's fluid volume balance will be maintained or improve  Outcome: Progressing     Problem: Risk for Infection and Impaired Wound Healing  Goal: Patient's wound/surgical incision will decrease in size and heals properly  Outcome: Progressing     Problem: Risk for Venous Thromboembolism (VTE)  Goal: VTE prevention measures will be implemented and patient will remain free from VTE  Outcome: Progressing     Problem: Discharge Barriers/Planning  Goal: Patient's continuum of care needs are met  Outcome: Progressing     Problem: Risk for Injury  Goal: Patient and fetus will be free of preventable injury/complications  Outcome: Progressing   The patient is Stable - Low risk of patient condition declining or worsening    Shift Goals  Clinical Goals: Continuous EFM and TOCO for fetal wellbeing  Patient Goals: rest  Family Goals: support    Progress made toward(s) clinical / shift goals:  continuous EFM and TOCO, rest, No fever or S&S of infection    Patient is not progressing towards the following goals:

## 2025-07-19 NOTE — CARE PLAN
The patient is Watcher - Medium risk of patient condition declining or worsening    Shift Goals  Clinical Goals: Monitor Twins, Promote rest  Patient Goals: rest  Family Goals: support    Problem: Psychosocial - L&D  Goal: Patient's level of anxiety will decrease  Outcome: Progressing   Encouraging pt to voice concerns and keeping updated on POC.      Problem: Discharge Barriers/Planning  Goal: Patient's continuum of care needs are met  Outcome: Progressing     Discussing pt wishes with provider and answering questions.

## 2025-07-19 NOTE — PROGRESS NOTES
0700. Report from Flaca URBAN. POC discussed and resumed.    0739. At the bedside. Pt reports +FM, no uc's or bleeding. Pt inquiring about the continuous EFM and TOCO. Discussed with the pt that we can talk with her MD about the POC for today. She states that she understands.    0820. Dr. Villafuerte at the bedside. Pt may be NST Q Shift.    1900. Report to Gloria URBAN.

## 2025-07-20 NOTE — CARE PLAN
Problem: Risk for Excess Fluid Volume  Goal: Patient will demonstrate pulse, blood pressure and neurologic signs within expected ranges and without any respiratory complications  Outcome: Progressing     Problem: Psychosocial - L&D  Goal: Patient's level of anxiety will decrease  Outcome: Progressing  Goal: Patient will be able to discuss coping skills during hospitalization  Outcome: Progressing  Goal: Patient's ability to re-evaluate and adapt role responsibilities will improve  Outcome: Progressing  Goal: Spiritual and cultural needs incorporated into hospitalization  Outcome: Progressing     Problem: Cardiac Output  Goal: Patient will remain normotensive throughout hospitalization  Outcome: Progressing     Problem: Pain  Goal: Patient's pain will be alleviated or reduced to the patient’s comfort goal  Outcome: Progressing     Problem: Risk for Fluid Imbalance  Goal: Patient's fluid volume balance will be maintained or improve  Outcome: Progressing     Problem: Risk for Infection and Impaired Wound Healing  Goal: Patient's wound/surgical incision will decrease in size and heals properly  Outcome: Progressing  Goal: Patient will remain free from infection  Outcome: Progressing     Problem: Risk for Venous Thromboembolism (VTE)  Goal: VTE prevention measures will be implemented and patient will remain free from VTE  Outcome: Progressing     Problem: Discharge Barriers/Planning  Goal: Patient's continuum of care needs are met  Outcome: Progressing     Problem: Risk for Injury  Goal: Patient and fetus will be free of preventable injury/complications  Outcome: Progressing   The patient is Stable - Low risk of patient condition declining or worsening    Shift Goals  Clinical Goals: EFM for fetal wellbeing  Patient Goals: rest  Family Goals: support    Progress made toward(s) clinical / shift goals:  EFM for fetal wellbeing, no pain    Patient is not progressing towards the following goals:

## 2025-07-20 NOTE — PROGRESS NOTES
"     [facility] ANTEPARTUM PROGRESS NOTE;     Brandie Hunter is a 24 y.o. female  at 27.0, with Di/DI twin gestation. TW- A with PROM at 13 weeks of gestation. Reviewed 's not. Appreciate good detail note about patient's desire to Intervene for fetal distress and wanting to every thing possible for both twins. She was ocassional mild variable deceleration last night. FHR loof reassuring this morning. No Ucs.             Patient Active Problem List     Diagnosis Date Noted    Dichorionic diamniotic twin pregnancy in second trimester 2025    Premature rupture of membranes in second trimester 2025    Other chest pain 10/03/2024    Shortness of breath 10/03/2024    Bradycardia 10/03/2024    Dyspepsia 2023    Muscle spasm 10/30/2023    Tension headache 10/30/2023    Gastroesophageal reflux disease without esophagitis 2022    Lymph node enlargement 2022    Environmental and seasonal allergies 2021    Hair loss 2020    Nausea 10/06/2020    Abnormal WBC count 2020    Anxiety 2020    Current moderate episode of major depressive disorder (HCC) 2020            SUBJECTIVE:  Patient seen and examined by myself.She is doing well this morning. No significant events overnight . Denies HA, change in vision, RUQ/epigastric pain, SOB, CP, fever, nausea/vomiting, edema or calf pain.Continues to have off and on leaking of fluid.     Contractions: Denies  Leaking of fluid: Present  Vaginal bleeding: Denies  Fetal movement: Present      Review of systems; denies fevers, chills, headaches, vision changes, dizziness, or abdominal pain        Physical examination;  /57   Pulse 98   Temp 36.2 °C (97.2 °F) (Temporal)   Resp 16   Ht 1.575 m (5' 2\")   Wt 71.2 kg (157 lb)   LMP 2025 (Approximate)   SpO2 98%   BMI 28.72 kg/m²   Appearance/Psychiatric: appears well and in NAD  Constitutional: The patient is well nourished.  Respiratory: Her respiratory " effort is normal.  Gastrointestinal: Soft, gravid, non-tender  Extremities: no edema               Lab Results   Component Value Date/Time     SODIUM 135 2025 07:53 AM     POTASSIUM 3.9 2025 07:53 AM     CHLORIDE 104 2025 07:53 AM     CO2 22 2025 07:53 AM     GLUCOSE 84 2025 07:53 AM     BUN 8 2025 07:53 AM     CREATININE 0.45 (L) 2025 07:53 AM                Lab Results   Component Value Date/Time     WBC 12.9 (H) 2025 04:25 PM     RBC 3.79 (L) 2025 04:25 PM     HEMOGLOBIN 11.9 (L) 2025 04:25 PM     HEMATOCRIT 36.1 (L) 2025 04:25 PM     MCV 95.3 2025 04:25 PM     MCH 31.4 2025 04:25 PM     MCHC 33.0 2025 04:25 PM     MPV 10.1 2025 04:25 PM     NEUTSPOLYS 73.90 (H) 2025 04:25 PM     LYMPHOCYTES 15.00 (L) 2025 04:25 PM     MONOCYTES 7.30 2025 04:25 PM     EOSINOPHILS 0.90 2025 04:25 PM     BASOPHILS 0.50 2025 04:25 PM            NST-pending     Assessment  IUP AT 26w5d   Ms. Hunter is a 24 y.o. year old female at 27.0 weks admitted on  2025 due to , prelabor rupture of membranes of baby A in a di-di monozygotic twin pregnancy. 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 She is s/p BMZ x 2 (7/3, ) and latency abx. She was consulted by NICU on 7/3/2025 in regards to possible RDS. She states at that time she did not have any questions. Will attempt to get additional consult to discuss changes in interventions for twin A. BP today was 119/57.       Plan:  1. Continue observation  2. NST q shift    3. Growth US to be done this week ()             - US 2025,done by [facility].                          - Twin A: EFW: <1%; 642g; Breech maternal left; MVP: 1.7; UA s/d ratio: 3.19                          - Twin B: EFW: 20%, 865g; Transverse maternal right; MVP: 4.4; UA s/d ratio: 2.88  -Due to the increase in growth of A from last US will intervene with  twin A.    -US 7/16/25 by hospital                          - Twin A: EFW: 10%, 900 g. MVP: 3.1 cm; FHR: 141 bpm; Breech, maternal left;  Pulmonary hypoplasia                          - Twin B: EFW: 35%, 908 g; MVP: 3.9; FHR: 163 bpm; Variable presentation; maternal right                4. S/p BMZ x 2 (7/3, 7/4)  5. S/p latency abx   6. S/p social work consult 7/3/2025  7. S/p Nicu consult 7/3/2025. Pt states had consult with NICU 7/15/2025. I did not see a note in the chart. If for any reason a consult needs to be placed the nurse will reach out to me.  8. GBS negative.  9. Request NICU to come and talk o patient again.  10. Recommend FHR monitoring q shift for 1 hr., unless any evidence of non reassuring FHR pattern.  11. SCD for DVT prophylaxis.

## 2025-07-20 NOTE — PROGRESS NOTES
1900 Report received from Owen SALGADO RN. POC discussed. Care assumed.    1905 RN at bedside. Pt is visiting with family. Denies needs at this time. Pt will call out when she is ready for RN to return for assessment.     2159 EFM X 3 applied.  Pt reports + FM, denies changes in amniotic fluid. Denies Ucs and VB.     2306 EFM X 3 removed.  Pt given CHG wipes. Educated on timing and technique. Pt verbalizes understanding. Pt agrees to complete after her shower.     0700 Report given to Owen SALGADO RN. POC discussed. Care relinquished.

## 2025-07-20 NOTE — CARE PLAN
The patient is Watcher - Medium risk of patient condition declining or worsening    Shift Goals  Clinical Goals: Continuous EFM and TOCO for fetal wellbeing  Patient Goals: rest  Family Goals: support    Progress made toward(s) clinical / shift goals:    Problem: Risk for Infection and Impaired Wound Healing  Goal: Patient will remain free from infection  Description: Target End Date:  1 to 3 days    1.  Utilize Standard Precautions at all times to reduce the risk of transmission of microorganisms from both recognized and unrecognized sources of infection  2.  Infection prevention handouts provided (general/device/diagnosis specific) and documented in Patient Education  3.  Limit vaginal exams as necessary  4.  Use aseptic technique during vaginal exams  5.  Administer antibiotic therapy per provider order  6.  Assess for removal of lines and drains  7.  Line/drain care per policy and/or provider orders  Outcome: Progressing  Note: Assess amniotic fluid. Assess maternal vital signs and FHT for signs and symptoms of infection. Patient is free from signs of infection at this time.      Problem: Risk for Injury  Goal: Patient and fetus will be free of preventable injury/complications  Description: Target End Date:  Prior to discharge or change in level of care    1.  Monitor uterine activity and if applicable progression of labor  2.  Monitor fetal well being  3.  Assure resuscitative equipment is available and within reach  Outcome: Progressing  Note: EFM X 3 applied. Assess FHT and uterine activity for safety and well being. Report fetal decelerations to provider.        Patient is not progressing towards the following goals:

## 2025-07-21 ASSESSMENT — PAIN DESCRIPTION - PAIN TYPE
TYPE: ACUTE PAIN

## 2025-07-21 NOTE — CARE PLAN
The patient is Watcher - Medium risk of patient condition declining or worsening    Shift Goals  Clinical Goals: monitor twins, promote rest  Patient Goals: Rest  Family Goals: Support    Progress made toward(s) clinical / shift goals:    Problem: Pain  Goal: Patient's pain will be alleviated or reduced to the patient’s comfort goal  Outcome: Progressing  Pt continuously monitored for pain, educated on pain management options. Call light within reach, pt understands to call for RN regarding any needs or concerns.      Problem: Risk for Infection and Impaired Wound Healing  Goal: Patient's wound/surgical incision will decrease in size and heals properly  Outcome: Progressing  Pt will remain afebrile and will show no s/s of infection.     Patient is not progressing towards the following goals:

## 2025-07-21 NOTE — PROGRESS NOTES
"ANTEPARTUM PROGRESS NOTE;    Brandie Hunter is a 24 y.o. female  at 27w1d.  Admitted with DI/Di Twins and pPROM on Twin A.  S/P steroids and abx.  No signs of infection or labor  Good fetal movements    Review of systems; denies vaginal bleeding, leakage of fluid, uterine contractions, fever chills or abdominal pain    Physical examination;  Alert and oriented x3  Gen.-well-developed well-nourished female in no apparent distress  HEENT-normocephalic, nontraumatic,EOMI,PERRLA  /64   Pulse 93   Temp 36 °C (96.8 °F) (Temporal)   Resp 14   Ht 1.575 m (5' 2\")   Wt 71.2 kg (157 lb)   LMP 2025 (Approximate)   SpO2 94%   BMI 28.72 kg/m²   Abdomen-Soft, gravid, nontender  Extremities without cyanosis clubbing or edema  Neurologic grossly intact    Labs:  Admission on 2025   Component Date Value Ref Range Status    WBC 2025 11.9 (H)  4.8 - 10.8 K/uL Final    RBC 2025 3.76 (L)  4.20 - 5.40 M/uL Final    Hemoglobin 2025 11.9 (L)  12.0 - 16.0 g/dL Final    Hematocrit 2025 34.2 (L)  37.0 - 47.0 % Final    MCV 2025 91.0  81.4 - 97.8 fL Final    MCH 2025 31.6  27.0 - 33.0 pg Final    MCHC 2025 34.8  32.2 - 35.5 g/dL Final    RDW 2025 44.6  35.9 - 50.0 fL Final    Platelet Count 2025 255  164 - 446 K/uL Final    MPV 2025 9.9  9.0 - 12.9 fL Final    Neutrophils-Polys 2025 75.30 (H)  44.00 - 72.00 % Final    Lymphocytes 2025 13.50 (L)  22.00 - 41.00 % Final    Monocytes 2025 7.90  0.00 - 13.40 % Final    Eosinophils 2025 1.10  0.00 - 6.90 % Final    Basophils 2025 0.50  0.00 - 1.80 % Final    Immature Granulocytes 2025 1.70 (H)  0.00 - 0.90 % Final    Nucleated RBC 2025 0.00  0.00 - 0.20 /100 WBC Final    Neutrophils (Absolute) 2025 8.97 (H)  1.82 - 7.42 K/uL Final    Includes immature neutrophils, if present.    Lymphs (Absolute) 2025 1.61  1.00 - 4.80 K/uL Final    Monos (Absolute) " 07/02/2025 0.94 (H)  0.00 - 0.85 K/uL Final    Eos (Absolute) 07/02/2025 0.13  0.00 - 0.51 K/uL Final    Baso (Absolute) 07/02/2025 0.06  0.00 - 0.12 K/uL Final    Immature Granulocytes (abs) 07/02/2025 0.20 (H)  0.00 - 0.11 K/uL Final    NRBC (Absolute) 07/02/2025 0.00  K/uL Final    Syphilis, Treponemal Qual 07/02/2025 Non-Reactive  Non-Reactive Final    Comment: Result of Non-reactive indicates no serologic evidence of  infection with Treponema pallidum.  (Incubating or early  primary syphilis cannot be excluded).      Holding Tube - Bb 07/02/2025 DONE   Final    Color 07/02/2025 Yellow   Final    Character 07/02/2025 Cloudy (A)   Final    Specific Gravity 07/02/2025 1.009  <1.035 Final    Ph 07/02/2025 6.5  5.0 - 8.0 Final    Glucose 07/02/2025 Negative  Negative mg/dL Final    Ketones 07/02/2025 Negative  Negative mg/dL Final    Protein 07/02/2025 30 (A)  Negative mg/dL Final    Bilirubin 07/02/2025 Negative  Negative Final    Urobilinogen, Urine 07/02/2025 0.2  <=1.0 EU/dL Final    Nitrite 07/02/2025 Negative  Negative Final    Leukocyte Esterase 07/02/2025 Negative  Negative Final    Occult Blood 07/02/2025 Trace (A)  Negative Final    Micro Urine Req 07/02/2025 Microscopic   Final    Strep Gp B DNA PCR 07/02/2025 Negative  Negative Final    WBC 07/02/2025 3-5  /hpf Final    Comment: Female  <12 Yr 0-2  >12 Yr 0-5  Male   0-2      RBC 07/02/2025 6-10 (A)  0 - 2 /hpf Final    Bacteria 07/02/2025 Many (A)  None /hpf Final    Epithelial Cells 07/02/2025 >20 (A)  0 - 5 /hpf Final    Please note new reference range effective 10/23/2024.    Urine Casts 07/02/2025 0-2  0 - 2 /lpf Final    Comment: Casts present are presumed to be hyaline unless otherwise  noted.  Pathological casts are specified separately if present.      ABO Grouping Only 07/02/2025 O   Final    Rh Grouping Only 07/02/2025 POS   Final    Antibody Screen-Cod 07/02/2025 NEG   Final    Component Cellular 07/02/2025 N/A   Final    ABO Rh Confirm  07/02/2025 O POS   Final    Rubella IgG Antibody 07/02/2025 58.20  IU/mL Final    Comment: This assay is performed using electrochemiluminescence immunoassay  on Roche inocencio e immunoassay analyzers.  Interpretive Criteria:  < 10 IU/mL   Negative for anti-Rubella IgG  >= 10 IU/mL   Positive for anti-Rubella IgG  The presence of IgG antibodies to Rubella virus is an indication of previous  exposure either by prior infection or by vaccination. Results obtained with  the [employer] Rubella IgG assay and cannot be used interchangeably with assays  from other manufacturers.      ABO Grouping Only 07/09/2025 O   Final    Rh Grouping Only 07/09/2025 POS   Final    Antibody Screen-Cod 07/09/2025 NEG   Final    Sodium 07/09/2025 135  135 - 145 mmol/L Final    Potassium 07/09/2025 3.9  3.6 - 5.5 mmol/L Final    Chloride 07/09/2025 104  96 - 112 mmol/L Final    Co2 07/09/2025 22  20 - 33 mmol/L Final    Anion Gap 07/09/2025 9.0  7.0 - 16.0 Final    Glucose 07/09/2025 84  65 - 99 mg/dL Final    Bun 07/09/2025 8  8 - 22 mg/dL Final    Creatinine 07/09/2025 0.45 (L)  0.50 - 1.40 mg/dL Final    Calcium 07/09/2025 8.7  8.5 - 10.5 mg/dL Final    Correct Calcium 07/09/2025 9.1  8.5 - 10.5 mg/dL Final    AST(SGOT) 07/09/2025 17  12 - 45 U/L Final    ALT(SGPT) 07/09/2025 20  2 - 50 U/L Final    Alkaline Phosphatase 07/09/2025 88  30 - 99 U/L Final    Total Bilirubin 07/09/2025 0.3  0.1 - 1.5 mg/dL Final    Albumin 07/09/2025 3.5  3.2 - 4.9 g/dL Final    Total Protein 07/09/2025 6.2  6.0 - 8.2 g/dL Final    Globulin 07/09/2025 2.7  1.9 - 3.5 g/dL Final    A-G Ratio 07/09/2025 1.3  g/dL Final    GFR (CKD-EPI) 07/09/2025 138  >60 mL/min/1.73 m 2 Final    Comment: Estimated Glomerular Filtration Rate is calculated using  race neutral CKD-EPI 2021 equation per NKF-ASN recommendations.      ABO Grouping Only 07/12/2025 O   Final    Rh Grouping Only 07/12/2025 POS   Final    Antibody Screen-Cod 07/12/2025 NEG   Final    WBC 07/13/2025 12.9 (H)   4.8 - 10.8 K/uL Final    RBC 07/13/2025 3.79 (L)  4.20 - 5.40 M/uL Final    Hemoglobin 07/13/2025 11.9 (L)  12.0 - 16.0 g/dL Final    Hematocrit 07/13/2025 36.1 (L)  37.0 - 47.0 % Final    MCV 07/13/2025 95.3  81.4 - 97.8 fL Final    MCH 07/13/2025 31.4  27.0 - 33.0 pg Final    MCHC 07/13/2025 33.0  32.2 - 35.5 g/dL Final    RDW 07/13/2025 47.8  35.9 - 50.0 fL Final    Platelet Count 07/13/2025 200  164 - 446 K/uL Final    Results confirmed by repeat testing.    MPV 07/13/2025 10.1  9.0 - 12.9 fL Final    Neutrophils-Polys 07/13/2025 73.90 (H)  44.00 - 72.00 % Final    Lymphocytes 07/13/2025 15.00 (L)  22.00 - 41.00 % Final    Monocytes 07/13/2025 7.30  0.00 - 13.40 % Final    Eosinophils 07/13/2025 0.90  0.00 - 6.90 % Final    Basophils 07/13/2025 0.50  0.00 - 1.80 % Final    Immature Granulocytes 07/13/2025 2.40 (H)  0.00 - 0.90 % Final    Nucleated RBC 07/13/2025 0.00  0.00 - 0.20 /100 WBC Final    Neutrophils (Absolute) 07/13/2025 9.57 (H)  1.82 - 7.42 K/uL Final    Includes immature neutrophils, if present.    Lymphs (Absolute) 07/13/2025 1.94  1.00 - 4.80 K/uL Final    Monos (Absolute) 07/13/2025 0.94 (H)  0.00 - 0.85 K/uL Final    Eos (Absolute) 07/13/2025 0.11  0.00 - 0.51 K/uL Final    Baso (Absolute) 07/13/2025 0.06  0.00 - 0.12 K/uL Final    Immature Granulocytes (abs) 07/13/2025 0.31 (H)  0.00 - 0.11 K/uL Final    NRBC (Absolute) 07/13/2025 0.00  K/uL Final    ABO Grouping Only 07/20/2025 O   Final    Rh Grouping Only 07/20/2025 POS   Final    Antibody Screen-Cod 07/20/2025 NEG   Final       Scheduled Medications[1]     NST-as performed and read by myself; reactive NST without contractions    Impression;  IUP Twins (Di/Di) AT 27w1d    2.   pPROM on Twin A  Stable at this time, no signs of infection or labor    Plan;  Continue expectant management      Brad Jefferson M.D.        [1]   Scheduled Medications   Medication Dose Frequency    aspirin  81 mg DAILY    prenatal plus vitamin  1 Tablet DAILY

## 2025-07-21 NOTE — PROGRESS NOTES
0700- Report received from Awa URBAN., POC discussed, care assumed. Pt sleeping at this time.     1025- Pt still sleeping but easily aroused; said that she will call out for her monitoring. Educated pt that I will try to do her monitoring and meds by noon.     1145- EFM applied x2, TOCO applied for monitoring.     1855- Report given to Awa CARRASCO, POC discussed, care relinquished.

## 2025-07-21 NOTE — PROGRESS NOTES
1900- Report from Owen RN, POC discussed. Assumed care at this time.    0650- Report given to Kimberley URBAN, Care relinquished.

## 2025-07-22 ASSESSMENT — PAIN DESCRIPTION - PAIN TYPE: TYPE: ACUTE PAIN

## 2025-07-22 NOTE — CARE PLAN
The patient is Watcher - Medium risk of patient condition declining or worsening    Shift Goals  Clinical Goals: monitor twins, promote rest  Patient Goals: Rest  Family Goals: Support    Progress made toward(s) clinical / shift goals:    Problem: Pain  Goal: Patient's pain will be alleviated or reduced to the patient’s comfort goal  Outcome: Progressing  Note: Pt continuously monitored for pain, educated on pain management options. Call light within reach, pt understands to call for RN regarding any needs or concerns.      Problem: Risk for Infection and Impaired Wound Healing  Goal: Patient's wound/surgical incision will decrease in size and heals properly  Outcome: Progressing  Note: Pt will remain afebrile and will show no s/s of infection.       Patient is not progressing towards the following goals:

## 2025-07-22 NOTE — CARE PLAN
The patient is Stable - Low risk of patient condition declining or worsening    Shift Goals  Clinical Goals: reasurring FHT x2, minimal LOF, maintain pregnancy  Patient Goals: rest, make sure both babies are safe, get closer to 32-34 weeks gestation  Family Goals: support    Progress made toward(s) clinical / shift goals:    Problem: Risk for Injury  Goal: Patient and fetus will be free of preventable injury/complications  Outcome: Progressing  Note: Monitoring per orders with reassuring FHT noted x2     Problem: Risk for Infection and Impaired Wound Healing  Goal: Patient will remain free from infection  Outcome: Met  Note: Pt has remained free of signs and symptoms of infection related to prolonged ROM.        Patient is not progressing towards the following goals: n/a

## 2025-07-22 NOTE — PROGRESS NOTES
1328- report received from Nadia URBAN.  1415- pt assisted to bathroom, pad changed, pad weighed- 9 mL blood, decreased blood in toilet and on toilet paper. New azar pad applied, will continue to monitor.  1430- Dr Jefferson called, no answer- text message sent with update.   1435- response received from Dr Jefferson, no new orders at this time.   1535- pt called out to report addiotanl bleeding in toilet. Dr Ziegler updated, order received for IV fluids and NPO status.   1900- report to Leonie URBAN

## 2025-07-22 NOTE — CARE PLAN
The patient is Stable - Low risk of patient condition declining or worsening    Shift Goals  Clinical Goals: monitor twins  Patient Goals: rest and comfort  Family Goals: support    Progress made toward(s) clinical / shift goals:    Problem: Psychosocial - L&D  Goal: Patient's level of anxiety will decrease  Outcome: Progressing     Problem: Pain  Goal: Patient's pain will be alleviated or reduced to the patient’s comfort goal  Outcome: Progressing       Patient is not progressing towards the following goals:

## 2025-07-22 NOTE — PROGRESS NOTES
1250 - Report received from RAJNI Nguyen for lunch break. POC discussed. Care assumed.  1318 - Pt called out with new onset of VB. Visible bright red blood in toilet. Pt reports +FM. Denies UC/ cramping/ pressure/ pain.  1321 - This RN notified Dr. Barcenas. Orders received.  1327 - EFM/toco in place. Pt verbalized understanding to save peripads for EBL.  1328 - Report given to RAJNI Nguyen. Care transferred back to primary RN.

## 2025-07-22 NOTE — PROGRESS NOTES
9450- Report received from Kimberley URBAN, POC discussed, questions answered. Care assumed at this time.    8506- Report given to Wendy URBAN, Care relinquished.

## 2025-07-23 ASSESSMENT — PAIN DESCRIPTION - PAIN TYPE: TYPE: ACUTE PAIN

## 2025-07-23 NOTE — CARE PLAN
The patient is Stable - Low risk of patient condition declining or worsening    Shift Goals  Clinical Goals: monitor twins  Patient Goals: healthy mom healthy baby  Family Goals: support    Progress made toward(s) clinical / shift goals:    Problem: Psychosocial - L&D  Goal: Patient's level of anxiety will decrease  Outcome: Progressing       Problem: Risk for Injury  Goal: Patient and fetus will be free of preventable injury/complications  Outcome: Progressing

## 2025-07-23 NOTE — PROGRESS NOTES
"ANTEPARTUM PROGRESS NOTE;    Brandie Hunter is a 24 y.o. female  at 27w3d.    SUBJECTIVE:  Patient seen and examined. She is doing well this morning. She did have an episode of BRB that was noted in the toilet yesterday. At that time it was noted she had uterine irritability. This morning she has not noticed any irritability or contractions. She has not had any additional bleeding. Denies HA, change in vision, RUQ/epigastric pain, SOB, CP, fever, nausea/vomiting, edema or calf pain.    Contractions: noted yesterday, but none today  Leaking of fluid: denies  Vaginal bleeding: BRB noted yesterday x 1, but none currently  Fetal movement: present x 2    Review of systems; denies vaginal bleeding, leakage of fluid, uterine contractions, fever chills or abdominal pain    Past Medical History[1]    Physical examination;  Alert and oriented x3  Gen.-well-developed well-nourished female in no apparent distress  HEENT-normocephalic, nontraumatic,EOMI,PERRLA  /50   Pulse 80   Temp 37.2 °C (98.9 °F) (Temporal)   Resp 16   Ht 1.575 m (5' 2\")   Wt 71.2 kg (157 lb)   LMP 2025 (Approximate)   SpO2 94%   BMI 28.72 kg/m²   Abdomen-Soft, gravid, nontender  Extremities without cyanosis clubbing or edema  Neurologic grossly intact    Labs:  Lab Results   Component Value Date/Time    WBC 10.6 2025 01:40 PM    RBC 3.70 (L) 2025 01:40 PM    HEMOGLOBIN 11.6 (L) 2025 01:40 PM    HEMATOCRIT 35.1 (L) 2025 01:40 PM    MCV 94.9 2025 01:40 PM    MCH 31.4 2025 01:40 PM    MCHC 33.0 2025 01:40 PM    MPV 9.7 2025 01:40 PM    NEUTSPOLYS 73.90 (H) 2025 04:25 PM    LYMPHOCYTES 15.00 (L) 2025 04:25 PM    MONOCYTES 7.30 2025 04:25 PM    EOSINOPHILS 0.90 2025 04:25 PM    BASOPHILS 0.50 2025 04:25 PM       Lab Results   Component Value Date/Time    SODIUM 135 2025 07:53 AM    POTASSIUM 3.9 2025 07:53 AM    CHLORIDE 104 2025 07:53 " AM    CO2 22 2025 07:53 AM    GLUCOSE 84 2025 07:53 AM    BUN 8 2025 07:53 AM    CALCIUM 8.7 2025 07:53 AM    CREATININE 0.45 (L) 2025 07:53 AM           Scheduled Medications[2]     NST: pending      Assessment  IUP AT   Brandie Hunter is a 24 y.o. female  at 27w3d admitted on  2025 due to , prelabor rupture of membranes of baby A in a di-di monozygotic twin pregnancy. 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 She is s/p BMZ x 2 (7/3, ) and latency abx. She did have an episode of BRB noted in the toilet and NST showed irritability/contractions on the monitor. She was not feeling these, but was started on continunous monitoring and an IV was placed. She has not had any additionals episodes of bleeding and has not noted any contractions. BP this AM was 116/64. We will discontinue continuous monitoring as well as her IV. We will switch her NST to q8 hours for 1 hour in duration. If pt does not experience any bleeding x 24 hours, will consider starting her on magnesium.       Plan;  1. Continue observation and expectant management  2. NST q8 hours for 1 hour in duration  3. Growth US to be done this week ()             - US 2025,done by [facility].                          - Twin A: EFW: <1%; 642g; Breech maternal left; MVP: 1.7; UA s/d ratio: 3.19                          - Twin B: EFW: 20%, 865g; Transverse maternal right; MVP: 4.4; UA s/d ratio: 2.88  -Due to the increase in growth of A from last US will intervene with twin A.    -US 25 by hospital                          - Twin A: EFW: 10%, 900 g. MVP: 3.1 cm; FHR: 141 bpm; Breech, maternal left;  Pulmonary hypoplasia                          - Twin B: EFW: 35%, 908 g; MVP: 3.9; FHR: 163 bpm; Variable presentation; maternal right                4. S/p BMZ x 2 (7/3, )  5. S/p latency abx   6. S/p social work consult 7/3/2025  7. S/p Nicu consult 7/3/2025.  Pt states had consult with NICU 7/15/2025. I did not see a note in the chart. If for any reason a consult needs to be placed the nurse will reach out to me.  8. GBS negative.  9. Request NICU to come and talk to patient again.  10. Recommend FHR monitoring q shift for 1 hr., unless any evidence of non reassuring FHR pattern.  11. SCD for DVT prophylaxis.    Patient was seen in conjunction with Dr. Ronny MD [facility] who consulted and agrees with the plan.      Susie Esquivel P.A.-C.  [facility] Service   Obstetrics and Gynecology  7/23/2025     12:20 PM         [1]   Past Medical History:  Diagnosis Date    Abdominal discomfort 1/12/2022    Anxiety     Bacterial vaginosis 1/12/2022    Depression     Head ache     Heart murmur     as a baby    High risk heterosexual behavior 12/22/2021    Ingrown toenail 10/6/2020    Irregular periods 12/21/2020    Menarche 8/9/2014    Nausea 10/6/2020    Swollen lymph nodes 1/12/2022    Vaginal discharge 10/28/2020    Weight loss 12/21/2020   [2]   Scheduled Medications   Medication Dose Frequency    aspirin  81 mg DAILY    prenatal plus vitamin  1 Tablet DAILY

## 2025-07-23 NOTE — CARE PLAN
The patient is Watcher - Medium risk of patient condition declining or worsening    Shift Goals  Clinical Goals: monitor twins and bleeding  Patient Goals: healthy mom and baby  Family Goals: support    Progress made toward(s) clinical / shift goals:  progressing    Problem: Psychosocial - L&D  Goal: Patient will be able to discuss coping skills during hospitalization  Outcome: Progressing  Note: Pt updated on POC and all questions answered     Problem: Risk for Injury  Goal: Patient and fetus will be free of preventable injury/complications  Outcome: Progressing  Note: Pt ambulates well, wearing no slip socks and educated on use of call light     Statement Selected

## 2025-07-23 NOTE — PROGRESS NOTES
1900 report received from Wendy URBAN, dicussed POC with patient and answered all questions    1944 orders received from Dr. Jefferson that patient is able to shower, pt expressed understanding    1955 pt up to shower with a steady gait    2044 pt back in bed, EFM x 2 and TOCO applied. FOB bedside.    0700 report given to Owen URBAN

## 2025-07-23 NOTE — PROGRESS NOTES
0745: Care assumed of pt.     0755: Pt denies bleeding, reports + FM, reports occ ctxs.     0855: Dr Jefferson at bedside. Orders to d/c continuous monitoring and d/c IV infusion. Orders for 1hr EFM q8hrs. Orders for regular diet.    1704: Dr Jefferson reviewed FHT from 1545 - 1651, pt ok to stay off monitors per MD.    1900: Report given to Leonie KIM RN, care relinquished.

## 2025-07-24 ENCOUNTER — ANCILLARY PROCEDURE (OUTPATIENT)
Dept: OBGYN | Facility: MEDICAL CENTER | Age: 25
End: 2025-07-24
Attending: OBSTETRICS & GYNECOLOGY
Payer: COMMERCIAL

## 2025-07-24 ASSESSMENT — PAIN DESCRIPTION - PAIN TYPE: TYPE: ACUTE PAIN

## 2025-07-24 NOTE — CARE PLAN
The patient is Stable - Low risk of patient condition declining or worsening    Shift Goals  Clinical Goals: Reactive FHTs, Monitor vaginal bleeding  Patient Goals: Healthy mom, healthy baby  Family Goals: Support    Progress made toward(s) clinical / shift goals:      Problem: Risk for Injury  Goal: Patient and fetus will be free of preventable injury/complications  Outcome: Progressing  Note: 1 hr monitoring q8h. Continuous monitoring for vaginal bleeding.

## 2025-07-24 NOTE — CARE PLAN
The patient is Watcher - Medium risk of patient condition declining or worsening    Shift Goals  Clinical Goals: monitor twins and rest  Patient Goals: healthy mom and baby  Family Goals: support    Progress made toward(s) clinical / shift goals:  progressing    Problem: Psychosocial - L&D  Goal: Patient will be able to discuss coping skills during hospitalization  Outcome: Progressing  Note: Pt has FOB bedside, family visiting daily and RN at bedside to answer any questions as needed     Problem: Risk for Injury  Goal: Patient and fetus will be free of preventable injury/complications  Outcome: Progressing  Note: Pt educated on use of call light and use of non slip socks

## 2025-07-24 NOTE — PROGRESS NOTES
1900 report received from Curly URBAN    1920 RN at bedside to discuss POC, no questions at this time from patient    0705 report given to Leonie GHOSH RN

## 2025-07-24 NOTE — PROGRESS NOTES
"[facility] ANTEPARTUM PROGRESS NOTE;    Brandie Hunter is a 24 y.o. female  at 27w4d.    Patient Active Problem List    Diagnosis Date Noted    Dichorionic diamniotic twin pregnancy in second trimester 2025    Premature rupture of membranes in second trimester 2025    Other chest pain 10/03/2024    Shortness of breath 10/03/2024    Bradycardia 10/03/2024    Dyspepsia 2023    Muscle spasm 10/30/2023    Tension headache 10/30/2023    Gastroesophageal reflux disease without esophagitis 2022    Lymph node enlargement 2022    Environmental and seasonal allergies 2021    Hair loss 2020    Nausea 10/06/2020    Abnormal WBC count 2020    Anxiety 2020    Current moderate episode of major depressive disorder (HCC) 2020         SUBJECTIVE:  Patient seen and examined by myself and Dr. Wells. She is doing well this morning. No significant events overnight . Denies HA, change in vision, RUQ/epigastric pain, SOB, CP, fever, nausea/vomiting, edema or calf pain.     Contractions: denies  Leaking of fluid: Present  Vaginal bleeding: denies  Fetal movement: Present     Review of systems; denies fevers, chills, headaches, vision changes, dizziness, or abdominal pain      Physical examination;  /52   Pulse 71   Temp 36.4 °C (97.6 °F) (Temporal)   Resp 14   Ht 1.575 m (5' 2\")   Wt 71.2 kg (157 lb)   LMP 2025 (Approximate)   SpO2 97%   BMI 28.72 kg/m²   Appearance/Psychiatric: appears well and in NAD  Constitutional: The patient is well nourished.  Respiratory: Her respiratory effort is normal.  Gastrointestinal: Soft, gravid, non-tender  Extremities: no edema    Lab Results   Component Value Date/Time    SODIUM 135 2025 07:53 AM    POTASSIUM 3.9 2025 07:53 AM    CHLORIDE 104 2025 07:53 AM    CO2 22 2025 07:53 AM    GLUCOSE 84 2025 07:53 AM    BUN 8 2025 07:53 AM    CREATININE 0.45 (L) 2025 07:53 AM      Lab " Results   Component Value Date/Time    WBC 10.6 2025 01:40 PM    RBC 3.70 (L) 2025 01:40 PM    HEMOGLOBIN 11.6 (L) 2025 01:40 PM    HEMATOCRIT 35.1 (L) 2025 01:40 PM    MCV 94.9 2025 01:40 PM    MCH 31.4 2025 01:40 PM    MCHC 33.0 2025 01:40 PM    MPV 9.7 2025 01:40 PM    NEUTSPOLYS 73.90 (H) 2025 04:25 PM    LYMPHOCYTES 15.00 (L) 2025 04:25 PM    MONOCYTES 7.30 2025 04:25 PM    EOSINOPHILS 0.90 2025 04:25 PM    BASOPHILS 0.50 2025 04:25 PM        NST-pending  Dopplers done this AM- pending     Assessment  IUP AT 27w4d   Ms. Hunter is a 24 y.o. year old female at 27w4d admitted on 2025 due to , prelabor rupture of membranes of baby A in a di-di monozygotic twin pregnancy. 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 She is s/p BMZ x 2 (7/3, ) and latency abx. She did have an episode of BRB noted in the toilet yesterday. She has not had any additionals episodes of bleeding and has not noted any contractions. BP this AM was 107/52. If pt does not experience any bleeding x 24 hours, will consider starting her on magnesium Dopplers were done this AM.    Plan;  1. Continue observation and expectant management  2. NST q8 hours for 1 hour in duration  3. Growth US to be done this week ()             - US 2025,done by [facility].                          - Twin A: EFW: <1%; 642g; Breech maternal left; MVP: 1.7; UA s/d ratio: 3.19                          - Twin B: EFW: 20%, 865g; Transverse maternal right; MVP: 4.4; UA s/d ratio: 2.88  -Due to the increase in growth of A from last US will intervene with twin A.    -US 25 by hospital                          - Twin A: EFW: 10%, 900 g. MVP: 3.1 cm; FHR: 141 bpm; Breech, maternal left;  Pulmonary hypoplasia                          - Twin B: EFW: 35%, 908 g; MVP: 3.9; FHR: 163 bpm; Variable presentation; maternal right                4. S/p  BMZ x 2 (7/3, 7/4)  5. S/p latency abx   6. S/p social work consult 7/3/2025  7. S/p Nicu consult 7/3/2025. Pt states had consult with NICU 7/15/2025. I did not see a note in the chart. If for any reason a consult needs to be placed the nurse will reach out to me.  8. GBS negative.  9. Request NICU to come and talk to patient again.  11. SCD for DVT prophylaxis.  12. Dopplers this AM       MORIAH Negron  [facility] Service   Obstetrics and Gynecology  7/24/2025     9:05 AM      Patient was seen in conjunction with Brad Jefferson MD [facility]  who consulted and agrees with the plan.

## 2025-07-25 ASSESSMENT — PAIN DESCRIPTION - PAIN TYPE: TYPE: ACUTE PAIN

## 2025-07-25 NOTE — PROGRESS NOTES
"0700-Report received from Leonie URBAN. POC discussed, all questions have been answered at this time, care assumed.    0913-This RN at bedside for monitoring, EFM and TOCO applied, denies ctx and VB, +FM +LOF.    1045-This RN telephoned Dr. Ryan and requested provider to review tracing, tracing reviewed, provider would like 30 more min. Of tracing, pt would like to come off monitor at this time.     1307-This RN telephoned Dr. Jefferson and updated on pt status/FHT, FHT reviewed by provider, orders to keep observing.    1411-This RN updated Dr. Jefferson on pt status/FHT, orders to d/c monitoring at this time.     1757-This RN at bedside, pt reports VB at this time, pt reports she felt 1 UC and her \"stomach hurts a little\". EFM and TOCO applied.    1800-This RN updated Dr. Barcenas and Dr. Ryan on pt status/VB/pain/FHT/ctx.    1857-This RN telephoned Dr. Ryan and updated on pt status/FHT/ctx/VB, orders received, see orders for details.     1900-Report given to Obed URBAN. POC discussed, all questions have been answered at this time, care relinquished.   "

## 2025-07-25 NOTE — CARE PLAN
The patient is Stable - Low risk of patient condition declining or worsening    Shift Goals  Clinical Goals: rest, monitor EFM  Patient Goals: healthy mom healthy baby  Family Goals: adequate support of pt and baby    Progress made toward(s) clinical / shift goals:      Problem: Risk for Infection and Impaired Wound Healing  Goal: Patient's wound/surgical incision will decrease in size and heals properly  Outcome: Progressing  Note: Pt will remain afebrile, and will show no s/s of infection.     Problem: Risk for Injury  Goal: Patient and fetus will be free of preventable injury/complications  Outcome: Progressing  Note: EFM will be administered in order to distinguish adequate fetal oxygenation status.

## 2025-07-25 NOTE — PROGRESS NOTES
"[facility] ANTEPARTUM PROGRESS NOTE;    Brandie Huntre is a 24 y.o. female  at 27w5d.    Patient Active Problem List    Diagnosis Date Noted    Dichorionic diamniotic twin pregnancy in second trimester 2025    Premature rupture of membranes in second trimester 2025    Other chest pain 10/03/2024    Shortness of breath 10/03/2024    Bradycardia 10/03/2024    Dyspepsia 2023    Muscle spasm 10/30/2023    Tension headache 10/30/2023    Gastroesophageal reflux disease without esophagitis 2022    Lymph node enlargement 2022    Environmental and seasonal allergies 2021    Hair loss 2020    Nausea 10/06/2020    Abnormal WBC count 2020    Anxiety 2020    Current moderate episode of major depressive disorder (HCC) 2020         SUBJECTIVE:  Patient seen and examined by myself and Dr. Jefferson. She is doing well this morning. No significant events overnight . Denies HA, change in vision, RUQ/epigastric pain, SOB, CP, fever, nausea/vomiting, edema or calf pain.     Contractions: Denies  Leaking of fluid: Present  Vaginal bleeding: Denies  Fetal movement: Present     Review of systems; denies fevers, chills, headaches, vision changes, dizziness, or abdominal pain      Physical examination;  /56   Pulse 85   Temp 36.7 °C (98 °F) (Temporal)   Resp 16   Ht 1.575 m (5' 2\")   Wt 71.2 kg (157 lb)   LMP 2025 (Approximate)   SpO2 96%   BMI 28.72 kg/m²   Appearance/Psychiatric: appears well and in NAD  Constitutional: The patient is well nourished.  Respiratory: Her respiratory effort is normal.  Gastrointestinal: Soft, gravid, non-tender  Extremities: no edema    Lab Results   Component Value Date/Time    SODIUM 135 2025 07:53 AM    POTASSIUM 3.9 2025 07:53 AM    CHLORIDE 104 2025 07:53 AM    CO2 22 2025 07:53 AM    GLUCOSE 84 2025 07:53 AM    BUN 8 2025 07:53 AM    CREATININE 0.45 (L) 2025 07:53 AM      Lab " Results   Component Value Date/Time    WBC 10.6 2025 01:40 PM    RBC 3.70 (L) 2025 01:40 PM    HEMOGLOBIN 11.6 (L) 2025 01:40 PM    HEMATOCRIT 35.1 (L) 2025 01:40 PM    MCV 94.9 2025 01:40 PM    MCH 31.4 2025 01:40 PM    MCHC 33.0 2025 01:40 PM    MPV 9.7 2025 01:40 PM    NEUTSPOLYS 73.90 (H) 2025 04:25 PM    LYMPHOCYTES 15.00 (L) 2025 04:25 PM    MONOCYTES 7.30 2025 04:25 PM    EOSINOPHILS 0.90 2025 04:25 PM    BASOPHILS 0.50 2025 04:25 PM        NST-pending    Assessment  IUP AT 27w5d   Ms. Hunter is a 24 y.o. year old female at 27w5d admitted on  2025 due to , prelabor rupture of membranes of baby A in a di-di monozygotic twin pregnancy. 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 She is s/p BMZ x 2 (7/3, ) and latency abx. She did have an episode of BRB noted in the toilet 3 days ago. She has not had any additionals episodes of bleeding and has not noted any contractions. BP this AM was 121/56. Dopplers done yesterday.    Plan;  1.. Continue observation and expectant management  2. NST q8 hours for 1 hour in duration  3. Dopplers 2025  done by [facility]            - Twin A: EFW: <1%; 642g; Breech maternal left; MVP: 1.9; UA s/d ratio:2.53            - Twin B: EFW: 20%, 865g; Transverse maternal right; MVP: 5.0; UA s/d ratio:3.27                4. S/p BMZ x 2 (7/3, )  5. S/p latency abx   6. S/p social work consult 7/3/2025  7. S/p Nicu consult 7/3/2025. Pt states had consult with NICU 7/15/2025. I did not see a note in the chart. If for any reason a consult needs to be placed the nurse will reach out to me.  8. GBS negative.  11. SCD for DVT prophylaxis.      MORIAH Negron  [facility] Service   Obstetrics and Gynecology  2025     11:31 AM      Patient was seen in conjunction with Brad Jefferson MD [facility]  who consulted and agrees with the plan.

## 2025-07-25 NOTE — CARE PLAN
The patient is Watcher - Medium risk of patient condition declining or worsening    Shift Goals  Clinical Goals: rest and continue fetal monitoring  Patient Goals: healthy mom and baby  Family Goals: support    Progress made toward(s) clinical / shift goals: progressing    Problem: Psychosocial - L&D  Goal: Patient's level of anxiety will decrease  Outcome: Progressing     Problem: Risk for Injury  Goal: Patient and fetus will be free of preventable injury/complications  Outcome: Progressing  Note: Pt ha no slip socks in place, educated on use of call light

## 2025-07-26 ASSESSMENT — PAIN DESCRIPTION - PAIN TYPE: TYPE: ACUTE PAIN

## 2025-07-26 NOTE — CARE PLAN
Problem: Pain  Goal: Patient's pain will be alleviated or reduced to the patient’s comfort goal  Outcome: Progressing     Problem: Risk for Infection and Impaired Wound Healing  Goal: Patient's wound/surgical incision will decrease in size and heals properly  Outcome: Progressing   The patient is Watcher - Medium risk of patient condition declining or worsening    Shift Goals  Clinical Goals: stay pregnant  Patient Goals: healthy mom and healthy baby  Family Goals: support and rest    Progress made toward(s) clinical / shift goals:  Pt hasn't had pain or bleeding since yesterday. Md at bedside and pt okay to come off monitors and resume q shift monitoring unless pain pr bleeding reoccurs. Will reassess q hour throughout shift.

## 2025-07-26 NOTE — PROGRESS NOTES
Report received from Obed. Pt resting comfortably. Dr Jefferson at bedside. Pt denies VB, pain and reports +FM x2. Orders to d/c fluids, pt okay to eat and resume q shift monitoring unless pain or bleeding occurs.     Report given to Noc shift RN.

## 2025-07-26 NOTE — CARE PLAN
The patient is Watcher - Medium risk of patient condition declining or worsening    Shift Goals  Clinical Goals: stay pregnant  Patient Goals: healthy mom healthy baby  Family Goals: support    Progress made toward(s) clinical / shift goals:    Problem: Cardiac Output  Goal: Patient will remain normotensive throughout hospitalization  Outcome: Progressing     Problem: Pain  Goal: Patient's pain will be alleviated or reduced to the patient’s comfort goal  Outcome: Progressing  Note: The pt's pain will be alleviated to their comfort level using pain techniques such as heat pads, Medications       Patient is not progressing towards the following goals:

## 2025-07-26 NOTE — PROGRESS NOTES
"ANTEPARTUM PROGRESS NOTE;    Brandie Hunter is a 24 y.o. female  at 27w6d.  Admitted with Di/Di Twins and pPROM of Twin A    She had some bleeding yesterday which resolved.   This am, no contractions, bleeding or leaking fluid  Good FM on both infants    Review of systems; denies vaginal bleeding, leakage of fluid, uterine contractions, fever chills or abdominal pain    Physical examination;  Alert and oriented x3  Gen.-well-developed well-nourished female in no apparent distress  HEENT-normocephalic, nontraumatic,EOMI,PERRLA  /62   Pulse 83   Temp 36.2 °C (97.1 °F) (Temporal)   Resp 16   Ht 1.575 m (5' 2\")   Wt 71.2 kg (157 lb)   LMP 2025 (Approximate)   SpO2 94%   BMI 28.72 kg/m²   Abdomen-Soft, gravid, nontender  Extremities without cyanosis clubbing or edema  Neurologic grossly intact    Labs:  Admission on 2025   Component Date Value Ref Range Status    WBC 2025 11.9 (H)  4.8 - 10.8 K/uL Final    RBC 2025 3.76 (L)  4.20 - 5.40 M/uL Final    Hemoglobin 2025 11.9 (L)  12.0 - 16.0 g/dL Final    Hematocrit 2025 34.2 (L)  37.0 - 47.0 % Final    MCV 2025 91.0  81.4 - 97.8 fL Final    MCH 2025 31.6  27.0 - 33.0 pg Final    MCHC 2025 34.8  32.2 - 35.5 g/dL Final    RDW 2025 44.6  35.9 - 50.0 fL Final    Platelet Count 2025 255  164 - 446 K/uL Final    MPV 2025 9.9  9.0 - 12.9 fL Final    Neutrophils-Polys 2025 75.30 (H)  44.00 - 72.00 % Final    Lymphocytes 2025 13.50 (L)  22.00 - 41.00 % Final    Monocytes 2025 7.90  0.00 - 13.40 % Final    Eosinophils 2025 1.10  0.00 - 6.90 % Final    Basophils 2025 0.50  0.00 - 1.80 % Final    Immature Granulocytes 2025 1.70 (H)  0.00 - 0.90 % Final    Nucleated RBC 2025 0.00  0.00 - 0.20 /100 WBC Final    Neutrophils (Absolute) 2025 8.97 (H)  1.82 - 7.42 K/uL Final    Includes immature neutrophils, if present.    Lymphs (Absolute) " 07/02/2025 1.61  1.00 - 4.80 K/uL Final    Monos (Absolute) 07/02/2025 0.94 (H)  0.00 - 0.85 K/uL Final    Eos (Absolute) 07/02/2025 0.13  0.00 - 0.51 K/uL Final    Baso (Absolute) 07/02/2025 0.06  0.00 - 0.12 K/uL Final    Immature Granulocytes (abs) 07/02/2025 0.20 (H)  0.00 - 0.11 K/uL Final    NRBC (Absolute) 07/02/2025 0.00  K/uL Final    Syphilis, Treponemal Qual 07/02/2025 Non-Reactive  Non-Reactive Final    Comment: Result of Non-reactive indicates no serologic evidence of  infection with Treponema pallidum.  (Incubating or early  primary syphilis cannot be excluded).      Holding Tube - Bb 07/02/2025 DONE   Final    Color 07/02/2025 Yellow   Final    Character 07/02/2025 Cloudy (A)   Final    Specific Gravity 07/02/2025 1.009  <1.035 Final    Ph 07/02/2025 6.5  5.0 - 8.0 Final    Glucose 07/02/2025 Negative  Negative mg/dL Final    Ketones 07/02/2025 Negative  Negative mg/dL Final    Protein 07/02/2025 30 (A)  Negative mg/dL Final    Bilirubin 07/02/2025 Negative  Negative Final    Urobilinogen, Urine 07/02/2025 0.2  <=1.0 EU/dL Final    Nitrite 07/02/2025 Negative  Negative Final    Leukocyte Esterase 07/02/2025 Negative  Negative Final    Occult Blood 07/02/2025 Trace (A)  Negative Final    Micro Urine Req 07/02/2025 Microscopic   Final    Strep Gp B DNA PCR 07/02/2025 Negative  Negative Final    WBC 07/02/2025 3-5  /hpf Final    Comment: Female  <12 Yr 0-2  >12 Yr 0-5  Male   0-2      RBC 07/02/2025 6-10 (A)  0 - 2 /hpf Final    Bacteria 07/02/2025 Many (A)  None /hpf Final    Epithelial Cells 07/02/2025 >20 (A)  0 - 5 /hpf Final    Please note new reference range effective 10/23/2024.    Urine Casts 07/02/2025 0-2  0 - 2 /lpf Final    Comment: Casts present are presumed to be hyaline unless otherwise  noted.  Pathological casts are specified separately if present.      ABO Grouping Only 07/02/2025 O   Final    Rh Grouping Only 07/02/2025 POS   Final    Antibody Screen-Cod 07/02/2025 NEG   Final     Component Cellular 07/02/2025 N/A   Final    ABO Rh Confirm 07/02/2025 O POS   Final    Rubella IgG Antibody 07/02/2025 58.20  IU/mL Final    Comment: This assay is performed using electrochemiluminescence immunoassay  on Roche inocencio e immunoassay analyzers.  Interpretive Criteria:  < 10 IU/mL   Negative for anti-Rubella IgG  >= 10 IU/mL   Positive for anti-Rubella IgG  The presence of IgG antibodies to Rubella virus is an indication of previous  exposure either by prior infection or by vaccination. Results obtained with  the [employer] Rubella IgG assay and cannot be used interchangeably with assays  from other manufacturers.      ABO Grouping Only 07/09/2025 O   Final    Rh Grouping Only 07/09/2025 POS   Final    Antibody Screen-Cod 07/09/2025 NEG   Final    Sodium 07/09/2025 135  135 - 145 mmol/L Final    Potassium 07/09/2025 3.9  3.6 - 5.5 mmol/L Final    Chloride 07/09/2025 104  96 - 112 mmol/L Final    Co2 07/09/2025 22  20 - 33 mmol/L Final    Anion Gap 07/09/2025 9.0  7.0 - 16.0 Final    Glucose 07/09/2025 84  65 - 99 mg/dL Final    Bun 07/09/2025 8  8 - 22 mg/dL Final    Creatinine 07/09/2025 0.45 (L)  0.50 - 1.40 mg/dL Final    Calcium 07/09/2025 8.7  8.5 - 10.5 mg/dL Final    Correct Calcium 07/09/2025 9.1  8.5 - 10.5 mg/dL Final    AST(SGOT) 07/09/2025 17  12 - 45 U/L Final    ALT(SGPT) 07/09/2025 20  2 - 50 U/L Final    Alkaline Phosphatase 07/09/2025 88  30 - 99 U/L Final    Total Bilirubin 07/09/2025 0.3  0.1 - 1.5 mg/dL Final    Albumin 07/09/2025 3.5  3.2 - 4.9 g/dL Final    Total Protein 07/09/2025 6.2  6.0 - 8.2 g/dL Final    Globulin 07/09/2025 2.7  1.9 - 3.5 g/dL Final    A-G Ratio 07/09/2025 1.3  g/dL Final    GFR (CKD-EPI) 07/09/2025 138  >60 mL/min/1.73 m 2 Final    Comment: Estimated Glomerular Filtration Rate is calculated using  race neutral CKD-EPI 2021 equation per NKF-ASN recommendations.      ABO Grouping Only 07/12/2025 O   Final    Rh Grouping Only 07/12/2025 POS   Final    Antibody  Screen-Cod 07/12/2025 NEG   Final    WBC 07/13/2025 12.9 (H)  4.8 - 10.8 K/uL Final    RBC 07/13/2025 3.79 (L)  4.20 - 5.40 M/uL Final    Hemoglobin 07/13/2025 11.9 (L)  12.0 - 16.0 g/dL Final    Hematocrit 07/13/2025 36.1 (L)  37.0 - 47.0 % Final    MCV 07/13/2025 95.3  81.4 - 97.8 fL Final    MCH 07/13/2025 31.4  27.0 - 33.0 pg Final    MCHC 07/13/2025 33.0  32.2 - 35.5 g/dL Final    RDW 07/13/2025 47.8  35.9 - 50.0 fL Final    Platelet Count 07/13/2025 200  164 - 446 K/uL Final    Results confirmed by repeat testing.    MPV 07/13/2025 10.1  9.0 - 12.9 fL Final    Neutrophils-Polys 07/13/2025 73.90 (H)  44.00 - 72.00 % Final    Lymphocytes 07/13/2025 15.00 (L)  22.00 - 41.00 % Final    Monocytes 07/13/2025 7.30  0.00 - 13.40 % Final    Eosinophils 07/13/2025 0.90  0.00 - 6.90 % Final    Basophils 07/13/2025 0.50  0.00 - 1.80 % Final    Immature Granulocytes 07/13/2025 2.40 (H)  0.00 - 0.90 % Final    Nucleated RBC 07/13/2025 0.00  0.00 - 0.20 /100 WBC Final    Neutrophils (Absolute) 07/13/2025 9.57 (H)  1.82 - 7.42 K/uL Final    Includes immature neutrophils, if present.    Lymphs (Absolute) 07/13/2025 1.94  1.00 - 4.80 K/uL Final    Monos (Absolute) 07/13/2025 0.94 (H)  0.00 - 0.85 K/uL Final    Eos (Absolute) 07/13/2025 0.11  0.00 - 0.51 K/uL Final    Baso (Absolute) 07/13/2025 0.06  0.00 - 0.12 K/uL Final    Immature Granulocytes (abs) 07/13/2025 0.31 (H)  0.00 - 0.11 K/uL Final    NRBC (Absolute) 07/13/2025 0.00  K/uL Final    ABO Grouping Only 07/20/2025 O   Final    Rh Grouping Only 07/20/2025 POS   Final    Antibody Screen-Cod 07/20/2025 NEG   Final    WBC 07/22/2025 10.6  4.8 - 10.8 K/uL Final    RBC 07/22/2025 3.70 (L)  4.20 - 5.40 M/uL Final    Hemoglobin 07/22/2025 11.6 (L)  12.0 - 16.0 g/dL Final    Hematocrit 07/22/2025 35.1 (L)  37.0 - 47.0 % Final    MCV 07/22/2025 94.9  81.4 - 97.8 fL Final    MCH 07/22/2025 31.4  27.0 - 33.0 pg Final    MCHC 07/22/2025 33.0  32.2 - 35.5 g/dL Final    RDW  07/22/2025 46.9  35.9 - 50.0 fL Final    Platelet Count 07/22/2025 237  164 - 446 K/uL Final    MPV 07/22/2025 9.7  9.0 - 12.9 fL Final       Scheduled Medications[1]       Impression;  IUP TWINS AT 27w6d  Doing well, no signs of labor or infection    2.   pPROM TWIN A  She is s/p steroids and latency antibiotics    Plan;  Continue expectant management      Brad Jefferson M.D.        [1]   Scheduled Medications   Medication Dose Frequency    aspirin  81 mg DAILY    prenatal plus vitamin  1 Tablet DAILY

## 2025-07-26 NOTE — PROGRESS NOTES
1900: report received from RAJNI Kim. Care assumed    1930: pt called out reporting bleeding, this RN at . Pt showed picture on phone and this RN visualize pad, pink tinged bleeding noted. Dr. Gee notified, pt denies Uc's/cramping. Educated to call this RN for any Uc's or cramping or any new bleeding.     2002: Dr. Gee at  for eval of bleeding, pt agreeable to SSE and SVE. Pt noted to be cl/th per Dr. Gee. See MD note. POC discussed, will continue to closely monitor pt bleeding/any Uc's. Pt remains on continuous EFM and toco, orders received for pt to remain NPO and start maintenance IV fluids    2145: fetal tracing reviewed by MD. orders received for additional 500cc bolus per Dr. Ryan    0700: report give to RAJNI Francois. Care relinquished

## 2025-07-27 NOTE — CARE PLAN
The patient is Watcher - Medium risk of patient condition declining or worsening    Shift Goals  Clinical Goals: Reassuring FHT, Continue pregnancy to 34 wks.  Patient Goals: Healthy babies  Family Goals: Support    Progress made toward(s) clinical / shift goals:  Progressing      Problem: Risk for Excess Fluid Volume  Goal: Patient will demonstrate pulse, blood pressure and neurologic signs within expected ranges and without any respiratory complications  Outcome: Progressing     Problem: Psychosocial - L&D  Goal: Patient's level of anxiety will decrease  Outcome: Progressing  Goal: Patient will be able to discuss coping skills during hospitalization  Outcome: Progressing  Goal: Patient's ability to re-evaluate and adapt role responsibilities will improve  Outcome: Progressing  Goal: Spiritual and cultural needs incorporated into hospitalization  Outcome: Progressing     Problem: Cardiac Output  Goal: Patient will remain normotensive throughout hospitalization  Outcome: Progressing     Problem: Pain  Goal: Patient's pain will be alleviated or reduced to the patient’s comfort goal  Outcome: Progressing  Note: Pain assessed per unit standard with appropriate scale.   Education provided on comfort measures available.      Problem: Risk for Fluid Imbalance  Goal: Patient's fluid volume balance will be maintained or improve  Outcome: Progressing     Problem: Risk for Infection and Impaired Wound Healing  Goal: Patient's wound/surgical incision will decrease in size and heals properly  Outcome: Progressing     Problem: Risk for Venous Thromboembolism (VTE)  Goal: VTE prevention measures will be implemented and patient will remain free from VTE  Outcome: Progressing  Note: VTE education provided.  Pt ambulatory.      Problem: Discharge Barriers/Planning  Goal: Patient's continuum of care needs are met  Outcome: Progressing     Problem: Risk for Injury  Goal: Patient and fetus will be free of preventable  injury/complications  Outcome: Progressing

## 2025-07-27 NOTE — PROGRESS NOTES
0700- Report received. Care assumed. G1 at 28-0 with Di-Di twins admitted for PPROM of twin A. Betamethasone complete. 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   0824- TOCO/EFM placed. Pt denies ctx. Reports +FM. Reports clear, nonmalodorous fluid. Denies abdominal tenderness or otherwise feeling ill. Trace LLE pitting noted on shin. BPs reviewed. Pt reports occasional spots in her eyes at night. Denies HA or epigastric pain. Denies calf tenderness. Pt has worried about developing Preeclampsia. Precautions provided with instructions on symptoms to report.  0937- Dr. Jefferson in department. Report of above s/s provided and strip reviewed. Will keep on monitors pending a little more reactivity on twin B. MD in to see pt.   1121- Strip reviewed with peer. Reactive NST for 28-0 achieved. Monitors removed. Pt encouraged to notify RN for any concerns related to FM- agrees.   1230- To [employer] for baby shower.   1700- In room visiting with patient. No complaints.   1900- Report to RAJNI Martínez. Care relinquished. Pt continues to deny cramping, bleeding, abdominal pain, and continues to report +FM.

## 2025-07-27 NOTE — PROGRESS NOTES
1900- Report received from Priscila URBAN. Pt resting comfortably in stable condition. Care assumed a this time.    2359- Dr. Hammond notified of baby B deceleration.     0700- Report given to Nancy URBAN. Pt resting comfortably in stable condition. Care relinquished at this time.

## 2025-07-27 NOTE — PROGRESS NOTES
"ANTEPARTUM PROGRESS NOTE;    Michaelnelsy Hunter is a 24 y.o. female  at 28w0d.  Admitted with Di/Di Twin Gestation and pPROM on Twin A. She is s/p steroids and latency antibiotics.    Denies any problems this am. No contractions or bleeding. Good FM    Review of systems; denies vaginal bleeding, leakage of fluid, uterine contractions, fever chills or abdominal pain    Physical examination;  Alert and oriented x3  Gen.-well-developed well-nourished female in no apparent distress  HEENT-normocephalic, nontraumatic,EOMI,PERRLA  /73   Pulse 94   Temp 36 °C (96.8 °F) (Temporal)   Resp 16   Ht 1.575 m (5' 2\")   Wt 71.2 kg (157 lb)   LMP 2025 (Approximate)   SpO2 97%   BMI 28.72 kg/m²   Abdomen-Soft, gravid, nontender  Extremities without cyanosis clubbing or edema  Neurologic grossly intact    Labs:  Admission on 2025   Component Date Value Ref Range Status    WBC 2025 11.9 (H)  4.8 - 10.8 K/uL Final    RBC 2025 3.76 (L)  4.20 - 5.40 M/uL Final    Hemoglobin 2025 11.9 (L)  12.0 - 16.0 g/dL Final    Hematocrit 2025 34.2 (L)  37.0 - 47.0 % Final    MCV 2025 91.0  81.4 - 97.8 fL Final    MCH 2025 31.6  27.0 - 33.0 pg Final    MCHC 2025 34.8  32.2 - 35.5 g/dL Final    RDW 2025 44.6  35.9 - 50.0 fL Final    Platelet Count 2025 255  164 - 446 K/uL Final    MPV 2025 9.9  9.0 - 12.9 fL Final    Neutrophils-Polys 2025 75.30 (H)  44.00 - 72.00 % Final    Lymphocytes 2025 13.50 (L)  22.00 - 41.00 % Final    Monocytes 2025 7.90  0.00 - 13.40 % Final    Eosinophils 2025 1.10  0.00 - 6.90 % Final    Basophils 2025 0.50  0.00 - 1.80 % Final    Immature Granulocytes 2025 1.70 (H)  0.00 - 0.90 % Final    Nucleated RBC 2025 0.00  0.00 - 0.20 /100 WBC Final    Neutrophils (Absolute) 2025 8.97 (H)  1.82 - 7.42 K/uL Final    Includes immature neutrophils, if present.    Lymphs (Absolute) 2025 1.61 "  1.00 - 4.80 K/uL Final    Monos (Absolute) 07/02/2025 0.94 (H)  0.00 - 0.85 K/uL Final    Eos (Absolute) 07/02/2025 0.13  0.00 - 0.51 K/uL Final    Baso (Absolute) 07/02/2025 0.06  0.00 - 0.12 K/uL Final    Immature Granulocytes (abs) 07/02/2025 0.20 (H)  0.00 - 0.11 K/uL Final    NRBC (Absolute) 07/02/2025 0.00  K/uL Final    Syphilis, Treponemal Qual 07/02/2025 Non-Reactive  Non-Reactive Final    Comment: Result of Non-reactive indicates no serologic evidence of  infection with Treponema pallidum.  (Incubating or early  primary syphilis cannot be excluded).      Holding Tube - Bb 07/02/2025 DONE   Final    Color 07/02/2025 Yellow   Final    Character 07/02/2025 Cloudy (A)   Final    Specific Gravity 07/02/2025 1.009  <1.035 Final    Ph 07/02/2025 6.5  5.0 - 8.0 Final    Glucose 07/02/2025 Negative  Negative mg/dL Final    Ketones 07/02/2025 Negative  Negative mg/dL Final    Protein 07/02/2025 30 (A)  Negative mg/dL Final    Bilirubin 07/02/2025 Negative  Negative Final    Urobilinogen, Urine 07/02/2025 0.2  <=1.0 EU/dL Final    Nitrite 07/02/2025 Negative  Negative Final    Leukocyte Esterase 07/02/2025 Negative  Negative Final    Occult Blood 07/02/2025 Trace (A)  Negative Final    Micro Urine Req 07/02/2025 Microscopic   Final    Strep Gp B DNA PCR 07/02/2025 Negative  Negative Final    WBC 07/02/2025 3-5  /hpf Final    Comment: Female  <12 Yr 0-2  >12 Yr 0-5  Male   0-2      RBC 07/02/2025 6-10 (A)  0 - 2 /hpf Final    Bacteria 07/02/2025 Many (A)  None /hpf Final    Epithelial Cells 07/02/2025 >20 (A)  0 - 5 /hpf Final    Please note new reference range effective 10/23/2024.    Urine Casts 07/02/2025 0-2  0 - 2 /lpf Final    Comment: Casts present are presumed to be hyaline unless otherwise  noted.  Pathological casts are specified separately if present.      ABO Grouping Only 07/02/2025 O   Final    Rh Grouping Only 07/02/2025 POS   Final    Antibody Screen-Cod 07/02/2025 NEG   Final    Component Cellular  07/02/2025 N/A   Final    ABO Rh Confirm 07/02/2025 O POS   Final    Rubella IgG Antibody 07/02/2025 58.20  IU/mL Final    Comment: This assay is performed using electrochemiluminescence immunoassay  on Roche inocencio e immunoassay analyzers.  Interpretive Criteria:  < 10 IU/mL   Negative for anti-Rubella IgG  >= 10 IU/mL   Positive for anti-Rubella IgG  The presence of IgG antibodies to Rubella virus is an indication of previous  exposure either by prior infection or by vaccination. Results obtained with  the [employer] Rubella IgG assay and cannot be used interchangeably with assays  from other manufacturers.      ABO Grouping Only 07/09/2025 O   Final    Rh Grouping Only 07/09/2025 POS   Final    Antibody Screen-Cod 07/09/2025 NEG   Final    Sodium 07/09/2025 135  135 - 145 mmol/L Final    Potassium 07/09/2025 3.9  3.6 - 5.5 mmol/L Final    Chloride 07/09/2025 104  96 - 112 mmol/L Final    Co2 07/09/2025 22  20 - 33 mmol/L Final    Anion Gap 07/09/2025 9.0  7.0 - 16.0 Final    Glucose 07/09/2025 84  65 - 99 mg/dL Final    Bun 07/09/2025 8  8 - 22 mg/dL Final    Creatinine 07/09/2025 0.45 (L)  0.50 - 1.40 mg/dL Final    Calcium 07/09/2025 8.7  8.5 - 10.5 mg/dL Final    Correct Calcium 07/09/2025 9.1  8.5 - 10.5 mg/dL Final    AST(SGOT) 07/09/2025 17  12 - 45 U/L Final    ALT(SGPT) 07/09/2025 20  2 - 50 U/L Final    Alkaline Phosphatase 07/09/2025 88  30 - 99 U/L Final    Total Bilirubin 07/09/2025 0.3  0.1 - 1.5 mg/dL Final    Albumin 07/09/2025 3.5  3.2 - 4.9 g/dL Final    Total Protein 07/09/2025 6.2  6.0 - 8.2 g/dL Final    Globulin 07/09/2025 2.7  1.9 - 3.5 g/dL Final    A-G Ratio 07/09/2025 1.3  g/dL Final    GFR (CKD-EPI) 07/09/2025 138  >60 mL/min/1.73 m 2 Final    Comment: Estimated Glomerular Filtration Rate is calculated using  race neutral CKD-EPI 2021 equation per NKF-ASN recommendations.      ABO Grouping Only 07/12/2025 O   Final    Rh Grouping Only 07/12/2025 POS   Final    Antibody Screen-Cod 07/12/2025  NEG   Final    WBC 07/13/2025 12.9 (H)  4.8 - 10.8 K/uL Final    RBC 07/13/2025 3.79 (L)  4.20 - 5.40 M/uL Final    Hemoglobin 07/13/2025 11.9 (L)  12.0 - 16.0 g/dL Final    Hematocrit 07/13/2025 36.1 (L)  37.0 - 47.0 % Final    MCV 07/13/2025 95.3  81.4 - 97.8 fL Final    MCH 07/13/2025 31.4  27.0 - 33.0 pg Final    MCHC 07/13/2025 33.0  32.2 - 35.5 g/dL Final    RDW 07/13/2025 47.8  35.9 - 50.0 fL Final    Platelet Count 07/13/2025 200  164 - 446 K/uL Final    Results confirmed by repeat testing.    MPV 07/13/2025 10.1  9.0 - 12.9 fL Final    Neutrophils-Polys 07/13/2025 73.90 (H)  44.00 - 72.00 % Final    Lymphocytes 07/13/2025 15.00 (L)  22.00 - 41.00 % Final    Monocytes 07/13/2025 7.30  0.00 - 13.40 % Final    Eosinophils 07/13/2025 0.90  0.00 - 6.90 % Final    Basophils 07/13/2025 0.50  0.00 - 1.80 % Final    Immature Granulocytes 07/13/2025 2.40 (H)  0.00 - 0.90 % Final    Nucleated RBC 07/13/2025 0.00  0.00 - 0.20 /100 WBC Final    Neutrophils (Absolute) 07/13/2025 9.57 (H)  1.82 - 7.42 K/uL Final    Includes immature neutrophils, if present.    Lymphs (Absolute) 07/13/2025 1.94  1.00 - 4.80 K/uL Final    Monos (Absolute) 07/13/2025 0.94 (H)  0.00 - 0.85 K/uL Final    Eos (Absolute) 07/13/2025 0.11  0.00 - 0.51 K/uL Final    Baso (Absolute) 07/13/2025 0.06  0.00 - 0.12 K/uL Final    Immature Granulocytes (abs) 07/13/2025 0.31 (H)  0.00 - 0.11 K/uL Final    NRBC (Absolute) 07/13/2025 0.00  K/uL Final    ABO Grouping Only 07/20/2025 O   Final    Rh Grouping Only 07/20/2025 POS   Final    Antibody Screen-Cod 07/20/2025 NEG   Final    WBC 07/22/2025 10.6  4.8 - 10.8 K/uL Final    RBC 07/22/2025 3.70 (L)  4.20 - 5.40 M/uL Final    Hemoglobin 07/22/2025 11.6 (L)  12.0 - 16.0 g/dL Final    Hematocrit 07/22/2025 35.1 (L)  37.0 - 47.0 % Final    MCV 07/22/2025 94.9  81.4 - 97.8 fL Final    MCH 07/22/2025 31.4  27.0 - 33.0 pg Final    MCHC 07/22/2025 33.0  32.2 - 35.5 g/dL Final    RDW 07/22/2025 46.9  35.9 - 50.0 fL  Final    Platelet Count 07/22/2025 237  164 - 446 K/uL Final    MPV 07/22/2025 9.7  9.0 - 12.9 fL Final    ABO Grouping Only 07/26/2025 O   Final    Rh Grouping Only 07/26/2025 POS   Final    Antibody Screen-Cod 07/26/2025 NEG   Final       Scheduled Medications[1]     NST-as performed and read by myself; reactive NST without contractions    Impression;  Di/Di Twins  at 28w0d  Stable, will check growth this week    2.   pPROM on Twin A  Stable. No signs of labor or infection    Plan;  Continue expectant management      Brad Jefferson M.D.        [1]   Scheduled Medications   Medication Dose Frequency    aspirin  81 mg DAILY    prenatal plus vitamin  1 Tablet DAILY

## 2025-07-28 ASSESSMENT — PAIN DESCRIPTION - PAIN TYPE: TYPE: ACUTE PAIN

## 2025-07-28 NOTE — CARE PLAN
The patient is Stable - Low risk of patient condition declining or worsening    Shift Goals  Clinical Goals: Monitor for signs of PTL.  Patient Goals: Rest  Family Goals: Support    Progress made toward(s) clinical / shift goals:    Problem: Pain  Goal: Patient's pain will be alleviated or reduced to the patient’s comfort goal  Outcome: Progressing   Patient educated to call out if pain is present, pt verbalized understanding.  Problem: Risk for Infection and Impaired Wound Healing  Goal: Patient's wound/surgical incision will decrease in size and heals properly  Outcome: Progressing   Hand hygiene and standard precautions implemented in care.   Problem: Risk for Venous Thromboembolism (VTE)  Goal: VTE prevention measures will be implemented and patient will remain free from VTE  Outcome: Progressing   Pt ambulatory.     Patient is not progressing towards the following goals: N/A.

## 2025-07-28 NOTE — PROGRESS NOTES
"ANTEPARTUM PROGRESS NOTE;    Brandie Hunter is a 24 y.o. female  at 28w1d.    SUBJECTIVE:  Patient seen and examined. She is doing well this morning. No significant events overnight, but patient was slightly concerned because she had one elevated blood pressure. Denies any HA, VA changes, or any other associated symptoms. She is requesting [facility] labs due to her one elevated BP. Denies RUQ/epigastric pain, SOB, CP, fever, nausea/vomiting, edema or calf pain.     Contractions: Denies  Leaking of fluid: Denies  Vaginal bleeding: Denies  Fetal movement: present x 2    Review of systems; denies vaginal bleeding, leakage of fluid, uterine contractions, fever chills or abdominal pain    Past Medical History[1]    Physical examination;  Alert and oriented x3  Gen.-well-developed well-nourished female in no apparent distress  HEENT-normocephalic, nontraumatic,EOMI,PERRLA  /70   Pulse 74   Temp 36.7 °C (98 °F) (Temporal)   Resp 16   Ht 1.575 m (5' 2\")   Wt 71.2 kg (157 lb)   LMP 2025 (Approximate)   SpO2 97%   BMI 28.72 kg/m²   Abdomen-Soft, gravid, nontender  Extremities without cyanosis clubbing or edema  Neurologic grossly intact    Labs:  Lab Results   Component Value Date/Time    WBC 10.6 2025 01:40 PM    RBC 3.70 (L) 2025 01:40 PM    HEMOGLOBIN 11.6 (L) 2025 01:40 PM    HEMATOCRIT 35.1 (L) 2025 01:40 PM    MCV 94.9 2025 01:40 PM    MCH 31.4 2025 01:40 PM    MCHC 33.0 2025 01:40 PM    MPV 9.7 2025 01:40 PM    NEUTSPOLYS 73.90 (H) 2025 04:25 PM    LYMPHOCYTES 15.00 (L) 2025 04:25 PM    MONOCYTES 7.30 2025 04:25 PM    EOSINOPHILS 0.90 2025 04:25 PM    BASOPHILS 0.50 2025 04:25 PM       Lab Results   Component Value Date/Time    SODIUM 135 2025 07:53 AM    POTASSIUM 3.9 2025 07:53 AM    CHLORIDE 104 2025 07:53 AM    CO2 22 2025 07:53 AM    GLUCOSE 84 2025 07:53 AM    BUN 8 2025 " 07:53 AM    CALCIUM 8.7 2025 07:53 AM    CREATININE 0.45 (L) 2025 07:53 AM           Scheduled Medications[2]     NST: pending      Assessment  IUP AT   Brandie Hunter is a 24 y.o. female  at 28w1d admitted on 2025 due to , prelabor rupture of membranes of baby A in a di-di monozygotic twin pregnancy. 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 She is s/p BMZ x 2 (7/3, ) and latency abx. She was slightly concerned due to one episode of an elevated blood pressure. Denies any other symptoms. Pt requesting H labs. CBC, CMP, and protein/creatinine ratio ordered. BP this AM was 127/70. Dopplers done on 2025.     Plan;  Continue observation and expectant management  NST q8 hours for 1 hour in duration  Dopplers 2025  done by [facility]            - Twin A: EFW: <1%; 642g; Breech maternal left; MVP: 1.9; UA s/d ratio:2.53            - Twin B: EFW: 20%, 865g; Transverse maternal right; MVP: 5.0; UA s/d ratio:3.27                4. S/p BMZ x 2 (7/3, )  5. S/p latency abx   6. S/p social work consult 7/3/2025  7. S/p Nicu consult 7/3/2025. Pt states had consult with NICU 7/15/2025. I did not see a note in the chart. If for any reason a consult needs to be placed the nurse will reach out to me.  8. GBS negative.  11. SCD for DVT prophylaxis.    Patient was seen in conjunction with Dr. Fabian MD [facility] who consulted and agrees with the plan.      Susie Esquivel P.A.-C.  [facility] Service   Obstetrics and Gynecology  2025     8:31 AM    I reviewed the medical records, interviewed the patient, and reviewed the above note by Ms. Susie Esquivel P.A.-C.  I agree with findings and plans as described above.  Billing Statement: I spent 35 minutes in evaluation and management of this patient, >50% in face-to-face communication and consultation with the patient.    Scott Peralta MD  MFM, [facility]  Mobile:  [phone]  7/28/2025,  @TIME        [1]   Past Medical History:  Diagnosis Date    Abdominal discomfort 1/12/2022    Anxiety     Bacterial vaginosis 1/12/2022    Depression     Head ache     Heart murmur     as a baby    High risk heterosexual behavior 12/22/2021    Ingrown toenail 10/6/2020    Irregular periods 12/21/2020    Menarche 8/9/2014    Nausea 10/6/2020    Swollen lymph nodes 1/12/2022    Vaginal discharge 10/28/2020    Weight loss 12/21/2020   [2]   Scheduled Medications   Medication Dose Frequency    aspirin  81 mg DAILY    prenatal plus vitamin  1 Tablet DAILY

## 2025-07-28 NOTE — PROGRESS NOTES
0655: Received report from Tanya URBAN, plan of care discussed.     3680: Report given to Flaca URBAN, plan of care discussed.

## 2025-07-28 NOTE — CARE PLAN
Problem: Risk for Excess Fluid Volume  Goal: Patient will demonstrate pulse, blood pressure and neurologic signs within expected ranges and without any respiratory complications  Outcome: Progressing     Problem: Risk for Venous Thromboembolism (VTE)  Goal: VTE prevention measures will be implemented and patient will remain free from VTE  Outcome: Progressing  Note: Ambulated, SCD available for when in bed for long periods of time.     Problem: Discharge Barriers/Planning  Goal: Patient's continuum of care needs are met  Outcome: Progressing     Problem: Risk for Injury  Goal: Patient and fetus will be free of preventable injury/complications  Outcome: Progressing  Note: Call light in place when in need of assistance. Pt aware of cords and other tripping hazards in room.      Problem: Cardiac Output  Goal: Patient will remain normotensive throughout hospitalization  Outcome: Met     Problem: Pain  Goal: Patient's pain will be alleviated or reduced to the patient’s comfort goal  Outcome: Met     Problem: Risk for Fluid Imbalance  Goal: Patient's fluid volume balance will be maintained or improve  Outcome: Met   The patient is Stable - Low risk of patient condition declining or worsening    Shift Goals  Clinical Goals: Healthy mom, healthy babies  Patient Goals: Stay Pregnant  Family Goals: support    Progress made toward(s) clinical / shift goals:  Progressing

## 2025-07-29 ASSESSMENT — PATIENT HEALTH QUESTIONNAIRE - PHQ9
2. FEELING DOWN, DEPRESSED, IRRITABLE, OR HOPELESS: NOT AT ALL
1. LITTLE INTEREST OR PLEASURE IN DOING THINGS: NOT AT ALL
SUM OF ALL RESPONSES TO PHQ9 QUESTIONS 1 AND 2: 0

## 2025-07-29 ASSESSMENT — PAIN DESCRIPTION - PAIN TYPE
TYPE: ACUTE PAIN
TYPE: ACUTE PAIN

## 2025-07-29 ASSESSMENT — FIBROSIS 4 INDEX: FIB4 SCORE: 0.37

## 2025-07-29 NOTE — CARE PLAN
The patient is Watcher - Medium risk of patient condition declining or worsening    Shift Goals  Clinical Goals: Promote rest  Patient Goals: rest  Family Goals: support      Problem: Discharge Barriers/Planning  Goal: Patient's continuum of care needs are met  Outcome: Progressing   Keeping updated on POC.  Problem: Risk for Injury  Goal: Patient and fetus will be free of preventable injury/complications  Outcome: Progressing

## 2025-07-29 NOTE — PROGRESS NOTES
1900: Report received from Judy CRUZ RN. POC discussed.    2121: Requested Dr. Gee to review FHT tracing for VD ( Baby B) Per MD keep pt on additional 10-15 minutes and pt can come off monitor as long as tracing looks good.     2140: RN asked pt if she is feeling an UC's, reports feeling some tightening. ABD palpated, pt taken off monitor to empty bladder.     2200: Pt reports only feeling her ABD tighten one time since voiding, RN encouraged pt to report any changes.    0700: Report given to Judy CRUZ RN. POC discussed.

## 2025-07-29 NOTE — CARE PLAN
Problem: Risk for Excess Fluid Volume  Goal: Patient will demonstrate pulse, blood pressure and neurologic signs within expected ranges and without any respiratory complications  7/29/2025 1147 by Judy Pizarro R.N.  Outcome: Progressing  7/29/2025 1051 by Judy Pizarro R.N.  Outcome: Progressing     Problem: Psychosocial - L&D  Goal: Patient's level of anxiety will decrease  7/29/2025 1147 by Judy Pizarro R.N.  Outcome: Progressing  7/29/2025 1051 by Judy Pizarro R.N.  Outcome: Progressing  Goal: Patient will be able to discuss coping skills during hospitalization  7/29/2025 1147 by Judy Pizarro R.N.  Outcome: Progressing  Note: Good support from family and staff   7/29/2025 1051 by Judy Pizarro R.N.  Outcome: Progressing  Goal: Patient's ability to re-evaluate and adapt role responsibilities will improve  7/29/2025 1147 by Judy Pizarro R.N.  Outcome: Progressing  7/29/2025 1051 by Judy Pizarro R.N.  Outcome: Progressing  Goal: Spiritual and cultural needs incorporated into hospitalization  7/29/2025 1147 by Judy Pizarro R.N.  Outcome: Progressing  7/29/2025 1051 by Judy Pizarro R.N.  Outcome: Progressing     Problem: Risk for Infection and Impaired Wound Healing  Goal: Patient's wound/surgical incision will decrease in size and heals properly  7/29/2025 1147 by Judy Pizarro R.N.  Outcome: Progressing  Note: Routine VS and temp to detect infection, pt aware of s/s.   7/29/2025 1051 by Judy Pizarro R.N.  Outcome: Progressing     Problem: Discharge Barriers/Planning  Goal: Patient's continuum of care needs are met  7/29/2025 1147 by Judy Pizarro R.N.  Outcome: Progressing  7/29/2025 1051 by Judy Pizarro R.N.  Outcome: Progressing     Problem: Risk for Injury  Goal: Patient and fetus will be free of preventable injury/complications  7/29/2025 1147 by Judy Pizarro R.N.  Outcome: Progressing  Note: Call light in  place when in need of assistance. Pt aware of cords and other tripping hazards in room.   7/29/2025 1051 by Judy Pizarro R.N.  Outcome: Progressing     Problem: Risk for Venous Thromboembolism (VTE)  Goal: VTE prevention measures will be implemented and patient will remain free from VTE  7/29/2025 1147 by Judy Pizarro, R.N.  Outcome: Met  7/29/2025 1051 by Judy Pizarro, R.N.  Outcome: Progressing   The patient is Stable - Low risk of patient condition declining or worsening    Shift Goals  Clinical Goals: Healthy mom, healthy babies  Patient Goals: Stay Pregnant  Family Goals: support    Progress made toward(s) clinical / shift goals:  Progressing

## 2025-07-29 NOTE — PROGRESS NOTES
"[facility] ANTEPARTUM PROGRESS NOTE;    Brandie Hunter is a 24 y.o. female  at 28w2d.    Patient Active Problem List    Diagnosis Date Noted    Dichorionic diamniotic twin pregnancy in second trimester 2025    Premature rupture of membranes in second trimester 2025    Other chest pain 10/03/2024    Shortness of breath 10/03/2024    Bradycardia 10/03/2024    Dyspepsia 2023    Muscle spasm 10/30/2023    Tension headache 10/30/2023    Gastroesophageal reflux disease without esophagitis 2022    Lymph node enlargement 2022    Environmental and seasonal allergies 2021    Hair loss 2020    Nausea 10/06/2020    Abnormal WBC count 2020    Anxiety 2020    Current moderate episode of major depressive disorder (HCC) 2020         SUBJECTIVE:  Patient seen and examined by myself and Dr. Peralta. She is doing well this morning. No significant events overnight. Denies HA, change in vision, RUQ/epigastric pain, SOB, CP, fever, nausea/vomiting, edema or calf pain.     Contractions: Denies  Leaking of fluid: Denies  Vaginal bleeding: Denies  Fetal movement: Present x2    Review of systems; denies fevers, chills, headaches, vision changes, dizziness, or abdominal pain      Physical examination;  /57   Pulse 83   Temp 36.6 °C (97.9 °F) (Temporal)   Resp 16   Ht 1.575 m (5' 2\")   Wt 71.2 kg (157 lb)   LMP 2025 (Approximate)   SpO2 94%   BMI 28.72 kg/m²   Appearance/Psychiatric: appears well and in NAD  Constitutional: The patient is well nourished.  Respiratory: Her respiratory effort is normal.  Gastrointestinal: Soft, gravid, non-tender  Extremities: no edema    Lab Results   Component Value Date/Time    SODIUM 135 2025 07:53 AM    POTASSIUM 3.9 2025 07:53 AM    CHLORIDE 104 2025 07:53 AM    CO2 22 2025 07:53 AM    GLUCOSE 84 2025 07:53 AM    BUN 8 2025 07:53 AM    CREATININE 0.45 (L) 2025 07:53 AM      Lab " Results   Component Value Date/Time    WBC 10.6 2025 01:40 PM    RBC 3.70 (L) 2025 01:40 PM    HEMOGLOBIN 11.6 (L) 2025 01:40 PM    HEMATOCRIT 35.1 (L) 2025 01:40 PM    MCV 94.9 2025 01:40 PM    MCH 31.4 2025 01:40 PM    MCHC 33.0 2025 01:40 PM    MPV 9.7 2025 01:40 PM    NEUTSPOLYS 73.90 (H) 2025 04:25 PM    LYMPHOCYTES 15.00 (L) 2025 04:25 PM    MONOCYTES 7.30 2025 04:25 PM    EOSINOPHILS 0.90 2025 04:25 PM    BASOPHILS 0.50 2025 04:25 PM          NST-pending    Assessment  IUP AT 28w2d   Ms. Hunter is a 24 y.o. year old female at 28w2d admitted onon 2025 due to , prelabor rupture of membranes of baby A in a di-di monozygotic twin pregnancy. 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 She is s/p BMZ x 2 (7/3, ) and latency abx. BP this AM was 113/57. Dopplers done on 2025.     Plan;  Continue observation and expectant management  NST q8 hours for 1 hour in duration  Dopplers 2025  done by [facility]            - Twin A: EFW: <1%; 642g; Breech maternal left; MVP: 1.9; UA s/d ratio:2.53            - Twin B: EFW: 20%, 865g; Transverse maternal right; MVP: 5.0; UA s/d ratio:3.27                4. S/p BMZ x 2 (7/3, )  5. S/p latency abx   6. S/p social work consult 7/3/2025  7. S/p Nicu consult 7/3/2025. Pt states had consult with NICU 7/15/2025. I did not see a note in the chart. If for any reason a consult needs to be placed the nurse will reach out to me.  8. GBS negative.  11. SCD for DVT prophylaxis.      HIRAL Negron.  [facility] Service   Obstetrics and Gynecology  2025     9:35 AM      Patient was seen in conjunction with Dr. Fabian MD, [facility] who consulted and agrees with the plan.

## 2025-07-29 NOTE — PROGRESS NOTES
0700: Received report from Flaca URBAN, plan of care discussed.     1900: Report given to Raina URBAN, plan of care discussed.

## 2025-07-30 NOTE — PROGRESS NOTES
0150 Report received from Raina URBAN. POC discussed. Pt denies needs at this time.   3495 Report to Obed URBAN. Care Relinquished.

## 2025-07-30 NOTE — CARE PLAN
Problem: Psychosocial - L&D  Goal: Patient's level of anxiety will decrease  Outcome: Progressing  Note: Collaborate with patient and family/caregiver to identify triggers and develop strategies to cope with anxiety. Implement stimuli reduction, calming techniques. Encourage patient/family/care giver participation. Collaborate with interdisciplinary team including Psychologist or Behavioral Health Team as needed.     The patient is Stable - Low risk of patient condition declining or worsening    Shift Goals  Clinical Goals: healthy mom, healthy babies  Patient Goals: stay pregnant  Family Goals: support    Progress made toward(s) clinical / shift goals: Progressing

## 2025-07-30 NOTE — PROGRESS NOTES
1330- Report received from RN Owen. POC discussed and assumed.    1400- Owen called Dr You regarding pt P/C ratio of 550. Orders received to complete 24 hr urine testing.    1430- Pt educated on 24 urine testing. Hat in bathroom with supplies. Pt verbalized understanding and sign placed on door. Pt concerned with dime sized wart on her knee and asked if she could have a dermatologist consult. Lisa DRAKE Looked into possibility and discovered that the pt would need to seek care with a derm outpatient. Pt verbalized understanding and Lisa GALINDO educated pt on ways to prevent spread.     1900- Report given to RAJNI Orona

## 2025-07-30 NOTE — PROGRESS NOTES
0530: report received from RAJNI Morton. Care assumed    0655: report given to RAJNI Weaver. Care relinquished

## 2025-07-30 NOTE — PROGRESS NOTES
Twin pregnancy at 28+ weeks with history of ruptured bag of water of twin A from early pregnancy.  Both fetuses have reassuring heart rate tracing.  Neonatology consultation obtained and recommendation is to intervene on behalf of of either twin should delivery become necessary.  Her vital signs are stable.  She registers no complaints.  Plans: Continue in-house observation.    Scott Peralta MD  [facility], [facility]  Mobile: [phone]  7/30/2025  @TIME

## 2025-07-30 NOTE — PROGRESS NOTES
0700. Report from Obed URBAN. POC discussed and resumed.    0852. Pt resting with no c/o leaking, bleeding or uc's. Pt notes +FM. Urine obtained and sent for P/C Ratio. Pt will call when ready for her lab draw and fetal monitoring. Pt has no needs at this time.    1330. Report to Imani URBAN.

## 2025-07-30 NOTE — CARE PLAN
Problem: Risk for Excess Fluid Volume  Goal: Patient will demonstrate pulse, blood pressure and neurologic signs within expected ranges and without any respiratory complications  Outcome: Progressing     Problem: Psychosocial - L&D  Goal: Patient's level of anxiety will decrease  Outcome: Progressing  Goal: Patient will be able to discuss coping skills during hospitalization  Outcome: Progressing  Goal: Patient's ability to re-evaluate and adapt role responsibilities will improve  Outcome: Progressing  Goal: Spiritual and cultural needs incorporated into hospitalization  Outcome: Progressing     Problem: Risk for Infection and Impaired Wound Healing  Goal: Patient's wound/surgical incision will decrease in size and heals properly  Outcome: Progressing  Goal: Patient will remain free from infection  Outcome: Progressing     Problem: Discharge Barriers/Planning  Goal: Patient's continuum of care needs are met  Outcome: Progressing     Problem: Risk for Injury  Goal: Patient and fetus will be free of preventable injury/complications  Outcome: Progressing     Problem: Cardiac Output  Goal: Patient will remain normotensive throughout hospitalization  Outcome: Progressing     Problem: Pain  Goal: Patient's pain will be alleviated or reduced to the patient’s comfort goal  Outcome: Progressing     Problem: Risk for Fluid Imbalance  Goal: Patient's fluid volume balance will be maintained or improve  Outcome: Progressing     Problem: Risk for Venous Thromboembolism (VTE)  Goal: VTE prevention measures will be implemented and patient will remain free from VTE  Outcome: Progressing   The patient is Stable - Low risk of patient condition declining or worsening    Shift Goals  Clinical Goals: stable VS, Healthy mom and babies  Patient Goals: rest  Family Goals: support    Progress made toward(s) clinical / shift goals:  Stable VS, No s&s of PTL, REST    Patient is not progressing towards the following goals:

## 2025-07-30 NOTE — PROGRESS NOTES
1900- Received report from Judy URBAN. Pt currently off unit with family for walk.     2000- Pt in room, reviewed POC with pt, verbalizes understanding and states no questions at this time. Reports no VB/LOF or contraction pain. Pt report positive fetal movement X2. Assessment completed.     2329- Report given to Selene URBAN, care relinquished at this time.     2359- Report received from Selene URBAN, care resumed at this time.     0150- Report given to Selene URBAN, care relinquished at this time.

## 2025-07-31 ASSESSMENT — PATIENT HEALTH QUESTIONNAIRE - PHQ9
SUM OF ALL RESPONSES TO PHQ9 QUESTIONS 1 AND 2: 0
1. LITTLE INTEREST OR PLEASURE IN DOING THINGS: NOT AT ALL
2. FEELING DOWN, DEPRESSED, IRRITABLE, OR HOPELESS: NOT AT ALL

## 2025-07-31 ASSESSMENT — PAIN DESCRIPTION - PAIN TYPE: TYPE: ACUTE PAIN

## 2025-07-31 NOTE — PROGRESS NOTES
0700 - Report from RAJNI Rawls   0830 - Assessment complete. Pt reports + FM, although a little less in baby A. Denies LOF, VB, UCs. Pt has bandaids all over hands due to wrry of warts. Pt asked to alert nurse for any vaginal bleeding, pt agrees. POC discussed, questions answered.   1700 - Dr Peralta notified of 24 hr urine result  1900 - Report to RAJNI Clay & RAJNI Obando

## 2025-07-31 NOTE — PROGRESS NOTES
1900: Report received from Imani URBAN, care assumed.    1940: Pt denies ctx, bleeding, reports + FM.    0700: Report given to Myla QUINONEZ RN, care relinquished.

## 2025-07-31 NOTE — CONSULTS
Maternal and Fetal Medicine Consult Note  Scott Peralta MD  7/31/2025    Her chief complaint today is very light-colored discharge on her pad.  She is also worried about a lesion on her skin of her right lower extremity just below the knee that has the looks of wart.  She endorses that she is hypochondriac and is worried about the wart spreading in her body.  She has had a dermatology consult but was not happy with the visit and is asking for a second opinion.    Impression: Brandie carries twins at 28 weeks 4 days now with twin A diagnosed with early premature rupture of membrane.  I had ordered preeclampsia labs per patient's request and without any evidence of hypertension.  Interestingly, her PC ratio was 0.55.    Plans and Recommendations: Will continue twice daily NST.  I asked her to collect her pads for me to see or keep a picture of it on her phone.  And let the nurse know if there is change of color.  I will plan to order 24-hour urine protein.  Billing Statement: I spent 35 minutes in evaluation and management of this patient, >50% in face-to-face communication and consultation with the patient.    Scott Peralta MD  [facility], [facility]  Mobile: [phone]  7/31/2025,  @TIME

## 2025-07-31 NOTE — CARE PLAN
The patient is Stable - Low risk of patient condition declining or worsening    Shift Goals  Clinical Goals: healthy mom healthy baby  Patient Goals: rest  Family Goals: support    Progress made toward(s) clinical / shift goals:    Problem: Psychosocial - L&D  Goal: Patient's level of anxiety will decrease  Outcome: Progressing  POC discussed     Problem: Risk for Injury  Goal: Patient and fetus will be free of preventable injury/complications  Outcome: Progressing  EFM per order

## 2025-08-01 NOTE — PROGRESS NOTES
0700- report received from Gisella URBAN. Plan of care discussed.     0845- assessment done. Patient denies any contractions, denies LOF or VB this morning. States positive fetal movement x2. Patient has band aid on hands to cover warts.     1900- report given to Leonie KIM RN

## 2025-08-01 NOTE — PROGRESS NOTES
Maternal and Fetal Medicine Consult Note  Scott Peralta MD  7/31/2025     Brandie endorses that she is comfortable and had a good night sleep.  She did not have discoloration of her vaginal discharge.  Yesterday she had a very light-colored discharge on her pad.  She is also worried aboutt a wart on her right lower extremity just below the knee that has the looks of a wart.  She endorses that she is hypochondriac and is worried about the wart spreading in her body.  She has had a dermatology consult but was not happy with the visit and is asking for a second opinion.     Impression: Brandie carries twins at 28 weeks 5 days now with twin A diagnosed with early premature rupture of membrane.  I had ordered preeclampsia labs per patient's request and without any evidence of hypertension.  Interestingly, her PC ratio was 0.55.     Plans and Recommendations: Will continue twice daily NST.  I asked her to collect her pads for me to see or keep a picture of it on her phone.  And let the nurse know if there is change of color.  I will order 24-hour urine protein.  Billing Statement: I spent 25 minutes in evaluation and management of this patient, >50% in face-to-face communication and consultation with the patient.     Scott Peralta MD  [facility]  Mobile: [phone]  7/31/2025,  @TIME

## 2025-08-01 NOTE — CARE PLAN
The patient is Stable - Low risk of patient condition declining or worsening    Shift Goals  Clinical Goals: monitor for s/s of  labor  Patient Goals: Rest  Family Goals: support    Problem: Risk for Excess Fluid Volume  Goal: Patient will demonstrate pulse, blood pressure and neurologic signs within expected ranges and without any respiratory complications  Outcome: Progressing     Problem: Psychosocial - L&D  Goal: Patient's level of anxiety will decrease  Outcome: Progressing     Problem: Risk for Infection and Impaired Wound Healing  Goal: Patient's wound/surgical incision will decrease in size and heals properly  Outcome: Progressing     Problem: Risk for Injury  Goal: Patient and fetus will be free of preventable injury/complications  Outcome: Progressing     Problem: Cardiac Output  Goal: Patient will remain normotensive throughout hospitalization  Outcome: Not Progressing     Problem: Pain  Goal: Patient's pain will be alleviated or reduced to the patient’s comfort goal  Outcome: Progressing     Progress made toward(s) clinical / shift goals:      Patient is not progressing towards the following goals:      Problem: Cardiac Output  Goal: Patient will remain normotensive throughout hospitalization  Outcome: Not Progressing

## 2025-08-01 NOTE — CARE PLAN
The patient is Stable - Low risk of patient condition declining or worsening    Shift Goals  Clinical Goals: monitor for s/s of  labor  Patient Goals: Rest  Family Goals: support    Progress made toward(s) clinical / shift goals:    Problem: Psychosocial - L&D  Goal: Patient's level of anxiety will decrease  Description: Target End Date:  1-3 days or as soon as patient condition allows    1.  Collaborate with patient and family/caregiver to identify triggers and develop strategies to cope with anxiety  2.  Implement stimuli reduction, calming techniques  3.  Pharmacologic management per provider order  4.  Encourage patient/family/care giver participation  5.  Collaborate with interdisciplinary team including Psychologist or Behavioral Health Team as needed  Outcome: Progressing     Problem: Pain  Goal: Patient's pain will be alleviated or reduced to the patient’s comfort goal  Description: Target End Date:  Prior to discharge or change in level of care    1.  Document pain using the appropriate pain scale per order or unit policy  2.  Administer pain medications per provider order and/or assist with epidural/spinal placement as needed  3.  Pain management medications as ordered  4.  Educate and implement non-pharmacologic comfort measures (i.e. relaxation, distraction, massage, cold/heat therapy, etc.)  5.  Assess for nonverbal signs of ineffective coping with pain and offer pain medication and/or epidural anesthesia  Outcome: Progressing  Note: Pt continuously monitored for pain, educated on pain management options.  Call light within reach, pt understands to call RN regarding any concerns

## 2025-08-02 ASSESSMENT — PAIN DESCRIPTION - PAIN TYPE
TYPE: ACUTE PAIN
TYPE: ACUTE PAIN

## 2025-08-02 NOTE — CARE PLAN
The patient is Watcher - Medium risk of patient condition declining or worsening    Shift Goals  Clinical Goals: rest and monitor twins  Patient Goals: health mom and babies  Family Goals: support    Progress made toward(s) clinical / shift goals:  progressing    Problem: Psychosocial - L&D  Goal: Patient will be able to discuss coping skills during hospitalization  Outcome: Progressing  Note: Support from FOB and family, discuss any needs and perform depression screenings     Problem: Risk for Injury  Goal: Patient and fetus will be free of preventable injury/complications  Outcome: Progressing  Note: No slip socks, use of call light and taking time with steady gait

## 2025-08-02 NOTE — CARE PLAN
The patient is Stable - Low risk of patient condition declining or worsening    Shift Goals  Clinical Goals: rest. remain pregnant, monitor twins  Patient Goals: healthy mom and babies  Family Goals: support    Progress made toward(s) clinical / shift goals:  Pt updated on POC and verbalized understanding. All questions and concerns posed by pt addressed at this time and PRN.    Patient is not progressing towards the following goals: N/A    Problem: Psychosocial - L&D  Goal: Patient's level of anxiety will decrease  Outcome: Progressing  Goal: Patient will be able to discuss coping skills during hospitalization  Outcome: Progressing  Goal: Spiritual and cultural needs incorporated into hospitalization  Outcome: Progressing     Problem: Discharge Barriers/Planning  Goal: Patient's continuum of care needs are met  Outcome: Progressing

## 2025-08-02 NOTE — PROGRESS NOTES
0700: SBAR report received from RAJNI Hadley. This RN assuming care of pt at this time. Assessment completed, see flowsheets for details. Patient reports intermittent continued LOF that is clear and odorless, denies VB, denies CTX. Affirms + FM x2. POC discussed, all questions answered. Patient encouraged to call out with needs/questions/change . Pt requesting to rest and have NST performed around noon today.    1130: EFM and TOCO applied. NST started at this time.     1150: Prolonged decel of baby A at this time. This RN entered room and repositioned pt from supine to left lying position. Pt then repositioned to R tilt. Pt now in semi fowlers with FHTs back to baseline. Pt reported feeling hot and dizzy during decel. This has since resolved with repositioning. MD Lane notified and strip reviewed with MD Lane. Per MD Lane, pt to remain on continuous fetal monitoring. Pt updated on POC.    1708: EFM and TOCO removed from pt at this time so that pt can use restroom and shower. Instructed pt to use call bell to call for this RN once ready to have EFM/TOCO reapplied.     1809: Pt called and is ready for EFM and TOCO to be reapplied. EFM and TOCO applied at this time.    1900: SBAR report given to RAJNI Hadley. Care relinquished by this RN at this time.

## 2025-08-02 NOTE — PROGRESS NOTES
"Maternal and Fetal Medicine Consult Note  Scott Peralta MD  2025    Thank you for the opportunity to provide consultation for your pleasant young patient.    Impression: She has been under observation for twin gestation with premature rupture of membrane of twin A.  Both twins continue to have reassuring FHR tracing.  The mother remains stable.  She endorses that she does not feel regular contractions.  She changes pads for leakage of fluid per vagina that is not discolored at this time.    Plans and Recommendations: I would recommend observation in hospital.  I have encouraged her to be up and around.  I shared with her that in a randomized trial patients at risk of  delivery who had low level physical activity had a better  outcome than the patients who were limited to hospital \"bedrest\".  Billing Statement: I spent 20 minutes in evaluation and management of this patient, >50% in face-to-face communication and consultation with the patient.    Scott Peralta MD  [facility], [facility]  Mobile: [phone]  2025,  @TIME   "

## 2025-08-03 ASSESSMENT — PAIN DESCRIPTION - PAIN TYPE: TYPE: ACUTE PAIN

## 2025-08-03 NOTE — PROGRESS NOTES
0655: Report received from Leonie KIM RN, care assumed.    0700: Dr Peralta at bedside. Orders received for 1hr EFM qshift. Pt denies ctx, changes in color of fluid, bleeding, reports + FM x2. Pt prefers to do 1hr EFM later afternoon today.      1850: Report given to Leonie KIM RN, care relinquished.

## 2025-08-03 NOTE — PROGRESS NOTES
"ANTEPARTUM PROGRESS NOTE;    Brandie Hunter is a 24 y.o. female  at 29w0d.  She has history of premature rupture of membrane of twin A at early second trimester.  Pregnancy has continued to 29 weeks and 0 days today.  This is a di/di twin gestation.    Subjective:   CC: Asymptomatic  Hx: PPROM twin \"A\"  RoS: denies vaginal bleeding, leakage of fluid, uterine contractions, fever chills or abdominal pain    Objective:  VS: /57   Pulse 85   Temp 36.2 °C (97.2 °F) (Temporal)   Resp 16   Ht 1.575 m (5' 2\")   Wt 75.8 kg (167 lb 1.7 oz)   LMP 2025 (Approximate)   SpO2 96%   BMI 30.56 kg/m² , Stable  PE: Alert and oriented x3. Gen.-well-developed well-nourished female in no apparent distress  HEENT-normocephalic, nontraumatic,EOMI,PERRLA  Abdomen-Soft, gravid, nontender  Extremities without cyanosis clubbing or edema  Neurologic grossly intact    Fetal Assessment: There was an episode of fetal heart rate deceleration last night that had recovered.  She switched to every shift NST now.    Labs:  Admission on 2025   Component Date Value Ref Range Status    WBC 2025 11.9 (H)  4.8 - 10.8 K/uL Final    RBC 2025 3.76 (L)  4.20 - 5.40 M/uL Final    Hemoglobin 2025 11.9 (L)  12.0 - 16.0 g/dL Final    Hematocrit 2025 34.2 (L)  37.0 - 47.0 % Final    MCV 2025 91.0  81.4 - 97.8 fL Final    MCH 2025 31.6  27.0 - 33.0 pg Final    MCHC 2025 34.8  32.2 - 35.5 g/dL Final    RDW 2025 44.6  35.9 - 50.0 fL Final    Platelet Count 2025 255  164 - 446 K/uL Final    MPV 2025 9.9  9.0 - 12.9 fL Final    Neutrophils-Polys 2025 75.30 (H)  44.00 - 72.00 % Final    Lymphocytes 2025 13.50 (L)  22.00 - 41.00 % Final    Monocytes 2025 7.90  0.00 - 13.40 % Final    Eosinophils 2025 1.10  0.00 - 6.90 % Final    Basophils 2025 0.50  0.00 - 1.80 % Final    Immature Granulocytes 2025 1.70 (H)  0.00 - 0.90 % Final    Nucleated " RBC 07/02/2025 0.00  0.00 - 0.20 /100 WBC Final    Neutrophils (Absolute) 07/02/2025 8.97 (H)  1.82 - 7.42 K/uL Final    Includes immature neutrophils, if present.    Lymphs (Absolute) 07/02/2025 1.61  1.00 - 4.80 K/uL Final    Monos (Absolute) 07/02/2025 0.94 (H)  0.00 - 0.85 K/uL Final    Eos (Absolute) 07/02/2025 0.13  0.00 - 0.51 K/uL Final    Baso (Absolute) 07/02/2025 0.06  0.00 - 0.12 K/uL Final    Immature Granulocytes (abs) 07/02/2025 0.20 (H)  0.00 - 0.11 K/uL Final    NRBC (Absolute) 07/02/2025 0.00  K/uL Final    Syphilis, Treponemal Qual 07/02/2025 Non-Reactive  Non-Reactive Final    Comment: Result of Non-reactive indicates no serologic evidence of  infection with Treponema pallidum.  (Incubating or early  primary syphilis cannot be excluded).      Holding Tube - Bb 07/02/2025 DONE   Final    Color 07/02/2025 Yellow   Final    Character 07/02/2025 Cloudy (A)   Final    Specific Gravity 07/02/2025 1.009  <1.035 Final    Ph 07/02/2025 6.5  5.0 - 8.0 Final    Glucose 07/02/2025 Negative  Negative mg/dL Final    Ketones 07/02/2025 Negative  Negative mg/dL Final    Protein 07/02/2025 30 (A)  Negative mg/dL Final    Bilirubin 07/02/2025 Negative  Negative Final    Urobilinogen, Urine 07/02/2025 0.2  <=1.0 EU/dL Final    Nitrite 07/02/2025 Negative  Negative Final    Leukocyte Esterase 07/02/2025 Negative  Negative Final    Occult Blood 07/02/2025 Trace (A)  Negative Final    Micro Urine Req 07/02/2025 Microscopic   Final    Strep Gp B DNA PCR 07/02/2025 Negative  Negative Final    WBC 07/02/2025 3-5  /hpf Final    Comment: Female  <12 Yr 0-2  >12 Yr 0-5  Male   0-2      RBC 07/02/2025 6-10 (A)  0 - 2 /hpf Final    Bacteria 07/02/2025 Many (A)  None /hpf Final    Epithelial Cells 07/02/2025 >20 (A)  0 - 5 /hpf Final    Please note new reference range effective 10/23/2024.    Urine Casts 07/02/2025 0-2  0 - 2 /lpf Final    Comment: Casts present are presumed to be hyaline unless otherwise  noted.  Pathological  casts are specified separately if present.      ABO Grouping Only 07/02/2025 O   Final    Rh Grouping Only 07/02/2025 POS   Final    Antibody Screen-Cod 07/02/2025 NEG   Final    Component Cellular 07/02/2025 N/A   Final    ABO Rh Confirm 07/02/2025 O POS   Final    Rubella IgG Antibody 07/02/2025 58.20  IU/mL Final    Comment: This assay is performed using electrochemiluminescence immunoassay  on Roche inocencio e immunoassay analyzers.  Interpretive Criteria:  < 10 IU/mL   Negative for anti-Rubella IgG  >= 10 IU/mL   Positive for anti-Rubella IgG  The presence of IgG antibodies to Rubella virus is an indication of previous  exposure either by prior infection or by vaccination. Results obtained with  the [employer] Rubella IgG assay and cannot be used interchangeably with assays  from other manufacturers.      ABO Grouping Only 07/09/2025 O   Final    Rh Grouping Only 07/09/2025 POS   Final    Antibody Screen-Cod 07/09/2025 NEG   Final    Sodium 07/09/2025 135  135 - 145 mmol/L Final    Potassium 07/09/2025 3.9  3.6 - 5.5 mmol/L Final    Chloride 07/09/2025 104  96 - 112 mmol/L Final    Co2 07/09/2025 22  20 - 33 mmol/L Final    Anion Gap 07/09/2025 9.0  7.0 - 16.0 Final    Glucose 07/09/2025 84  65 - 99 mg/dL Final    Bun 07/09/2025 8  8 - 22 mg/dL Final    Creatinine 07/09/2025 0.45 (L)  0.50 - 1.40 mg/dL Final    Calcium 07/09/2025 8.7  8.5 - 10.5 mg/dL Final    Correct Calcium 07/09/2025 9.1  8.5 - 10.5 mg/dL Final    AST(SGOT) 07/09/2025 17  12 - 45 U/L Final    ALT(SGPT) 07/09/2025 20  2 - 50 U/L Final    Alkaline Phosphatase 07/09/2025 88  30 - 99 U/L Final    Total Bilirubin 07/09/2025 0.3  0.1 - 1.5 mg/dL Final    Albumin 07/09/2025 3.5  3.2 - 4.9 g/dL Final    Total Protein 07/09/2025 6.2  6.0 - 8.2 g/dL Final    Globulin 07/09/2025 2.7  1.9 - 3.5 g/dL Final    A-G Ratio 07/09/2025 1.3  g/dL Final    GFR (CKD-EPI) 07/09/2025 138  >60 mL/min/1.73 m 2 Final    Comment: Estimated Glomerular Filtration Rate is  calculated using  race neutral CKD-EPI 2021 equation per NKF-ASN recommendations.      ABO Grouping Only 07/12/2025 O   Final    Rh Grouping Only 07/12/2025 POS   Final    Antibody Screen-Cod 07/12/2025 NEG   Final    WBC 07/13/2025 12.9 (H)  4.8 - 10.8 K/uL Final    RBC 07/13/2025 3.79 (L)  4.20 - 5.40 M/uL Final    Hemoglobin 07/13/2025 11.9 (L)  12.0 - 16.0 g/dL Final    Hematocrit 07/13/2025 36.1 (L)  37.0 - 47.0 % Final    MCV 07/13/2025 95.3  81.4 - 97.8 fL Final    MCH 07/13/2025 31.4  27.0 - 33.0 pg Final    MCHC 07/13/2025 33.0  32.2 - 35.5 g/dL Final    RDW 07/13/2025 47.8  35.9 - 50.0 fL Final    Platelet Count 07/13/2025 200  164 - 446 K/uL Final    Results confirmed by repeat testing.    MPV 07/13/2025 10.1  9.0 - 12.9 fL Final    Neutrophils-Polys 07/13/2025 73.90 (H)  44.00 - 72.00 % Final    Lymphocytes 07/13/2025 15.00 (L)  22.00 - 41.00 % Final    Monocytes 07/13/2025 7.30  0.00 - 13.40 % Final    Eosinophils 07/13/2025 0.90  0.00 - 6.90 % Final    Basophils 07/13/2025 0.50  0.00 - 1.80 % Final    Immature Granulocytes 07/13/2025 2.40 (H)  0.00 - 0.90 % Final    Nucleated RBC 07/13/2025 0.00  0.00 - 0.20 /100 WBC Final    Neutrophils (Absolute) 07/13/2025 9.57 (H)  1.82 - 7.42 K/uL Final    Includes immature neutrophils, if present.    Lymphs (Absolute) 07/13/2025 1.94  1.00 - 4.80 K/uL Final    Monos (Absolute) 07/13/2025 0.94 (H)  0.00 - 0.85 K/uL Final    Eos (Absolute) 07/13/2025 0.11  0.00 - 0.51 K/uL Final    Baso (Absolute) 07/13/2025 0.06  0.00 - 0.12 K/uL Final    Immature Granulocytes (abs) 07/13/2025 0.31 (H)  0.00 - 0.11 K/uL Final    NRBC (Absolute) 07/13/2025 0.00  K/uL Final    ABO Grouping Only 07/20/2025 O   Final    Rh Grouping Only 07/20/2025 POS   Final    Antibody Screen-Cod 07/20/2025 NEG   Final    WBC 07/22/2025 10.6  4.8 - 10.8 K/uL Final    RBC 07/22/2025 3.70 (L)  4.20 - 5.40 M/uL Final    Hemoglobin 07/22/2025 11.6 (L)  12.0 - 16.0 g/dL Final    Hematocrit 07/22/2025  35.1 (L)  37.0 - 47.0 % Final    MCV 07/22/2025 94.9  81.4 - 97.8 fL Final    MCH 07/22/2025 31.4  27.0 - 33.0 pg Final    MCHC 07/22/2025 33.0  32.2 - 35.5 g/dL Final    RDW 07/22/2025 46.9  35.9 - 50.0 fL Final    Platelet Count 07/22/2025 237  164 - 446 K/uL Final    MPV 07/22/2025 9.7  9.0 - 12.9 fL Final    ABO Grouping Only 07/26/2025 O   Final    Rh Grouping Only 07/26/2025 POS   Final    Antibody Screen-Cod 07/26/2025 NEG   Final    Total Protein, Urine 07/30/2025 23.2 (H)  0.0 - 15.0 mg/dL Final    Creatinine, Random Urine 07/30/2025 42.20  mg/dL Final    Comment: Reference ranges have not been established for this specimen type.  Result interpretation should include consideration of patient's  medical condition and clinical presentation.      Protein Creatinine Ratio 07/30/2025 550 (H)  10 - 107 mg/g Final    Sodium 07/30/2025 136  135 - 145 mmol/L Final    Potassium 07/30/2025 3.7  3.6 - 5.5 mmol/L Final    Chloride 07/30/2025 105  96 - 112 mmol/L Final    Co2 07/30/2025 21  20 - 33 mmol/L Final    Anion Gap 07/30/2025 10.0  7.0 - 16.0 Final    Glucose 07/30/2025 79  65 - 99 mg/dL Final    Bun 07/30/2025 6 (L)  8 - 22 mg/dL Final    Creatinine 07/30/2025 0.48 (L)  0.50 - 1.40 mg/dL Final    Calcium 07/30/2025 8.8  8.5 - 10.5 mg/dL Final    Correct Calcium 07/30/2025 9.4  8.5 - 10.5 mg/dL Final    AST(SGOT) 07/30/2025 17  12 - 45 U/L Final    ALT(SGPT) 07/30/2025 12  2 - 50 U/L Final    Alkaline Phosphatase 07/30/2025 149 (H)  30 - 99 U/L Final    Total Bilirubin 07/30/2025 <0.2  0.1 - 1.5 mg/dL Final    Albumin 07/30/2025 3.3  3.2 - 4.9 g/dL Final    Total Protein 07/30/2025 5.9 (L)  6.0 - 8.2 g/dL Final    Globulin 07/30/2025 2.6  1.9 - 3.5 g/dL Final    A-G Ratio 07/30/2025 1.3  g/dL Final    GFR (CKD-EPI) 07/30/2025 136  >60 mL/min/1.73 m 2 Final    Comment: Estimated Glomerular Filtration Rate is calculated using  race neutral CKD-EPI 2021 equation per NKF-ASN recommendations.      WBC 07/30/2025 10.2  " 4.8 - 10.8 K/uL Final    RBC 07/30/2025 3.74 (L)  4.20 - 5.40 M/uL Final    Hemoglobin 07/30/2025 12.1  12.0 - 16.0 g/dL Final    Hematocrit 07/30/2025 35.5 (L)  37.0 - 47.0 % Final    MCV 07/30/2025 94.9  81.4 - 97.8 fL Final    MCH 07/30/2025 32.4  27.0 - 33.0 pg Final    MCHC 07/30/2025 34.1  32.2 - 35.5 g/dL Final    RDW 07/30/2025 47.2  35.9 - 50.0 fL Final    Platelet Count 07/30/2025 253  164 - 446 K/uL Final    MPV 07/30/2025 10.2  9.0 - 12.9 fL Final    Neutrophils-Polys 07/30/2025 71.10  44.00 - 72.00 % Final    Lymphocytes 07/30/2025 16.30 (L)  22.00 - 41.00 % Final    Monocytes 07/30/2025 9.00  0.00 - 13.40 % Final    Eosinophils 07/30/2025 1.40  0.00 - 6.90 % Final    Basophils 07/30/2025 0.30  0.00 - 1.80 % Final    Immature Granulocytes 07/30/2025 1.90 (H)  0.00 - 0.90 % Final    Nucleated RBC 07/30/2025 0.00  0.00 - 0.20 /100 WBC Final    Neutrophils (Absolute) 07/30/2025 7.26  1.82 - 7.42 K/uL Final    Includes immature neutrophils, if present.    Lymphs (Absolute) 07/30/2025 1.66  1.00 - 4.80 K/uL Final    Monos (Absolute) 07/30/2025 0.92 (H)  0.00 - 0.85 K/uL Final    Eos (Absolute) 07/30/2025 0.14  0.00 - 0.51 K/uL Final    Baso (Absolute) 07/30/2025 0.03  0.00 - 0.12 K/uL Final    Immature Granulocytes (abs) 07/30/2025 0.19 (H)  0.00 - 0.11 K/uL Final    NRBC (Absolute) 07/30/2025 0.00  K/uL Final    Total Volume, Urine 07/30/2025 1800  mL Final    Total Protein, Urine 07/30/2025 14.5  0.0 - 15.0 mg/dL Final    Total Protein, 24 Hour Urine 07/30/2025 261.0 (H)  30.0 - 150.0 mg/24 Hr Final    ABO Grouping Only 08/01/2025 O   Final    Rh Grouping Only 08/01/2025 POS   Final    Antibody Screen-Cod 08/01/2025 NEG   Final       Scheduled Medications[1]     NST-as performed and read by myself; reactive NST without contractions    Impression;  IUP AT 29w0d    2.   PPROM Twin \" A\".  Vital signs are stable.  She has no complaint of cramping and there is no evidence of regular uterine contractions on " tocodynamometry.    Plan;  Will observe in house to 34 weeks.    My total time spent caring for the patient on the day of the encounter was 20 minutes.   This does not include time spent on separately billable procedures/tests.      Scott Peralta MD  [facility], [facility]  Mobile: [phone]  8/3/2025  [unfilled]         [1]   Scheduled Medications   Medication Dose Frequency    aspirin  81 mg DAILY    prenatal plus vitamin  1 Tablet DAILY

## 2025-08-03 NOTE — CARE PLAN
The patient is Watcher - Medium risk of patient condition declining or worsening    Shift Goals  Clinical Goals: rest and continue fetal monitoring  Patient Goals: health mom and babies  Family Goals: support    Progress made toward(s) clinical / shift goals:  progressing    Problem: Psychosocial - L&D  Goal: Patient will be able to discuss coping skills during hospitalization  Outcome: Progressing  Note: Family and FOB bedside daily to visit with patient, personal art and crafts bedside to help patient relax, personal TV and shows at bedside     Problem: Risk for Injury  Goal: Patient and fetus will be free of preventable injury/complications  Outcome: Progressing  Note: No slip socks applied, call light within reach and continue fetal monitoring

## 2025-08-03 NOTE — PROGRESS NOTES
1900 report received from Lisha URBAN    2056 Pt expressed desire to be off of continuous monitoring, spoke with Dr. Lane and at this time continuous monitoring is still in place. Will review with MFM in the AM, pt updated on POC and expressed understanding.    0630 RN at bedside, pt wants to request to be off of continuous monitoring. RN educated on why continuous monitoring is in place, pt would like to speak with providers today and states she may decline monitoring. Will pass along to dayshift RN.    9007 report given to Curly URBAN

## 2025-08-03 NOTE — CARE PLAN
The patient is Stable - Low risk of patient condition declining or worsening    Shift Goals  Clinical Goals: rest and continue fetal monitoring  Patient Goals: health mom and babies  Family Goals: support    Progress made toward(s) clinical / shift goals:       Problem: Psychosocial - L&D  Goal: Patient's level of anxiety will decrease  Outcome: Progressing  Questions answered     Problem: Risk for Injury  Goal: Patient and fetus will be free of preventable injury/complications  8/3/2025 0834 by Curly Alcala R.N.  Outcome: Progressing  EFM per order  8/3/2025 0833 by Curly Alcala R.N.  Outcome: Progressing

## 2025-08-04 ASSESSMENT — PAIN DESCRIPTION - PAIN TYPE
TYPE: ACUTE PAIN

## 2025-08-04 NOTE — PROGRESS NOTES
0340- Report received from Leonie Lion. Pt stable in bed at this time. Care assumed.     0700- Report given to Cari Lion. Care relinquished.

## 2025-08-04 NOTE — CARE PLAN
The patient is Stable - Low risk of patient condition declining or worsening    Shift Goals  Clinical Goals: Healthy mom, healthy baby  Patient Goals: Stay Pregnant  Family Goals: support    Progress made toward(s) clinical / shift goals:      Problem: Risk for Injury  Goal: Patient and fetus will be free of preventable injury/complications  Outcome: Progressing  Note: PT aware of cords and other tripping hazards in room. Call light in reach. PT verbalized understanding       Problem: Risk for Fluid Imbalance  Goal: Patient's fluid volume balance will be maintained or improve  Outcome: Progressing       Patient is not progressing towards the following goals:

## 2025-08-04 NOTE — CARE PLAN
The patient is Watcher - Medium risk of patient condition declining or worsening    Shift Goals  Clinical Goals: rest and continue fetal monitoring  Patient Goals: healthy mom and babies  Family Goals: support    Progress made toward(s) clinical / shift goals:  progressing    Problem: Psychosocial - L&D  Goal: Patient will be able to discuss coping skills during hospitalization  Outcome: Progressing  Note: POC discussed, questions answered and call light within reach     Problem: Risk for Injury  Goal: Patient and fetus will be free of preventable injury/complications  Outcome: Progressing  Note: No slip socks provided, sandals bedside, stable gait and educated on use of call light

## 2025-08-04 NOTE — PROGRESS NOTES
"ANTEPARTUM PROGRESS NOTE;    Brandie Hunter is a 24 y.o. female  at 29w1d.    SUBJECTIVE:  Patient seen and examined. She is doing well this morning and currently does not have any concerns/complaints. No significant events overnight. Denies HA, change in vision, RUQ/epigastric pain, SOB, CP, fever, nausea/vomiting, edema or calf pain.    Contractions: denies  Leaking of fluid: present without changes  Vaginal bleeding: denies  Fetal movement: present x 2    Review of systems; denies vaginal bleeding, leakage of fluid, uterine contractions, fever chills or abdominal pain    Past Medical History[1]    Physical examination;  Alert and oriented x3  Gen.-well-developed well-nourished female in no apparent distress  HEENT-normocephalic, nontraumatic,EOMI,PERRLA  /63   Pulse 82   Temp 36.5 °C (97.7 °F) (Temporal)   Resp 14   Ht 1.575 m (5' 2\")   Wt 75.8 kg (167 lb 1.7 oz)   LMP 2025 (Approximate)   SpO2 96%   BMI 30.56 kg/m²   Abdomen-Soft, gravid, nontender  Extremities without cyanosis clubbing or edema  Neurologic grossly intact    Labs:  Lab Results   Component Value Date/Time    WBC 10.2 2025 10:50 AM    RBC 3.74 (L) 2025 10:50 AM    HEMOGLOBIN 12.1 2025 10:50 AM    HEMATOCRIT 35.5 (L) 2025 10:50 AM    MCV 94.9 2025 10:50 AM    MCH 32.4 2025 10:50 AM    MCHC 34.1 2025 10:50 AM    MPV 10.2 2025 10:50 AM    NEUTSPOLYS 71.10 2025 10:50 AM    LYMPHOCYTES 16.30 (L) 2025 10:50 AM    MONOCYTES 9.00 2025 10:50 AM    EOSINOPHILS 1.40 2025 10:50 AM    BASOPHILS 0.30 2025 10:50 AM       Lab Results   Component Value Date/Time    SODIUM 136 2025 10:50 AM    POTASSIUM 3.7 2025 10:50 AM    CHLORIDE 105 2025 10:50 AM    CO2 21 2025 10:50 AM    GLUCOSE 79 2025 10:50 AM    BUN 6 (L) 2025 10:50 AM    CALCIUM 8.8 2025 10:50 AM    CREATININE 0.48 (L) 2025 10:50 AM     "       Scheduled Medications[2]     NST: pending      Assessment  IUP AT   Brandie Hunter is a 24 y.o. female  at 29w1d admitted on 2025 due to , prelabor rupture of membranes of baby A in a di-di monozygotic twin pregnancy. 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 She is s/p abx and BMZ x 2.  Dopplers done on 2025. Will get interval growth US this week with [facility] US tech. BP this /69. Will order 1 hour GTT to be done tomorrow (2025).      Plan;  Continue observation and expectant management  NST q8 hours for 1 hour in duration  Dopplers 2025  done by [facility]            - Twin A: EFW: <1%; 642g; Breech maternal left; MVP: 1.9; UA s/d ratio:2.53            - Twin B: EFW: 20%, 865g; Transverse maternal right; MVP: 5.0; UA s/d ratio:3.27                4. 1 hour GTT ordered to be done tomorrow 2025  5. S/p BMZ x 2 (7/3, )  6. S/p latency abx   7. S/p social work consult 7/3/2025  8. S/p Nicu consult 7/3/2025. Pt states had consult with NICU 7/15/2025. I did not see a note in the chart. If for any reason a consult needs to be placed the nurse will reach out to me.  9. GBS negative.  10. SCD for DVT prophylaxis.  11. Recommend delivery at 34 weeks of gestation,earlier delivery may be indicated for Maternal/ Fetal indications.    Patient was seen in conjunction with Dr. Christo MD [facility] who consulted and agrees with the plan.      Susie Esquivel P.A.-C.  [facility]   Obstetrics and Gynecology  2025     10:04 AM         [1]   Past Medical History:  Diagnosis Date    Abdominal discomfort 2022    Anxiety     Bacterial vaginosis 2022    Depression     Head ache     Heart murmur     as a baby    High risk heterosexual behavior 2021    Ingrown toenail 10/6/2020    Irregular periods 2020    Menarche 2014    Nausea 10/6/2020    Swollen lymph nodes 2022    Vaginal discharge 10/28/2020    Weight loss 2020    [2]   Scheduled Medications   Medication Dose Frequency    aspirin  81 mg DAILY    prenatal plus vitamin  1 Tablet DAILY

## 2025-08-04 NOTE — PROGRESS NOTES
0700: Report received from Deb URBAN, updated on POC    0815: RN at bedside, assessment done at this time. PT denies ctx, denies changes in LOF, denies VB. PT reports + FM x2. Abdomen palpates soft. PT afebrile. VSS. POC discussed, all questions answered at this time. Call light within reach and instructed to call for assistance.

## 2025-08-04 NOTE — PROGRESS NOTES
"0907- Report from RAJNI Alcala. Care assumed    1610- RNs called to bedside to assess skin breakdown noted by pt family member proximal to PICC line dressing. Skin breakdown noted at edge of dressing as well as irritation surrounding bandaid on the same upper arm area. See wound charting. Pt additionally has several bandaids on each hand covering her fingers and the dorsal surface. Pt reports she was concerned for warts on her hands which she reports she has been told are contagious. Pt reports feeling frequent medical anxiety and has been covering any spots on her hands for over 1 week. Pt reports her medical anxiety has increased since her time in the hospital and that she is \"a hypochondriac\"    1630- RNs at bedside. PICC line removed and dressing placed. All bandages removed from hands to prevent skin breakdown/irritation. No abnormal skin findings on hands at this time. Discussion of normal findings with pt and she is reassured at this time. Educated pt on risk breakdown with continued long-term adhesive use. Pt verbalizes understanding.   "

## 2025-08-05 ENCOUNTER — APPOINTMENT (OUTPATIENT)
Dept: RADIOLOGY | Facility: MEDICAL CENTER | Age: 25
End: 2025-08-05
Attending: OBSTETRICS & GYNECOLOGY
Payer: COMMERCIAL

## 2025-08-05 ENCOUNTER — ANESTHESIA (OUTPATIENT)
Dept: OBGYN | Facility: MEDICAL CENTER | Age: 25
End: 2025-08-05
Payer: COMMERCIAL

## 2025-08-05 ENCOUNTER — CLINICAL DOCUMENTATION ONLY (OUTPATIENT)
Dept: OTHER | Facility: MEDICAL CENTER | Age: 25
End: 2025-08-05

## 2025-08-05 ENCOUNTER — ANESTHESIA EVENT (OUTPATIENT)
Dept: OBGYN | Facility: MEDICAL CENTER | Age: 25
End: 2025-08-05
Payer: COMMERCIAL

## 2025-08-05 ENCOUNTER — APPOINTMENT (OUTPATIENT)
Dept: OBGYN | Facility: MEDICAL CENTER | Age: 25
End: 2025-08-05
Attending: OBSTETRICS & GYNECOLOGY
Payer: COMMERCIAL

## 2025-08-05 PROCEDURE — 700111 HCHG RX REV CODE 636 W/ 250 OVERRIDE (IP): Mod: JZ | Performed by: ANESTHESIOLOGY

## 2025-08-05 PROCEDURE — 700101 HCHG RX REV CODE 250: Performed by: ANESTHESIOLOGY

## 2025-08-05 PROCEDURE — 700105 HCHG RX REV CODE 258: Performed by: ANESTHESIOLOGY

## 2025-08-05 PROCEDURE — 700111 HCHG RX REV CODE 636 W/ 250 OVERRIDE (IP): Performed by: OBSTETRICS & GYNECOLOGY

## 2025-08-05 RX ORDER — ONDANSETRON 2 MG/ML
INJECTION INTRAMUSCULAR; INTRAVENOUS PRN
Status: DISCONTINUED | OUTPATIENT
Start: 2025-08-05 | End: 2025-08-05 | Stop reason: SURG

## 2025-08-05 RX ORDER — BUPIVACAINE HYDROCHLORIDE 7.5 MG/ML
INJECTION, SOLUTION INTRASPINAL PRN
Status: DISCONTINUED | OUTPATIENT
Start: 2025-08-05 | End: 2025-08-05 | Stop reason: SURG

## 2025-08-05 RX ORDER — MORPHINE SULFATE 0.5 MG/ML
INJECTION, SOLUTION EPIDURAL; INTRATHECAL; INTRAVENOUS PRN
Status: DISCONTINUED | OUTPATIENT
Start: 2025-08-05 | End: 2025-08-05 | Stop reason: SURG

## 2025-08-05 RX ORDER — OXYTOCIN 10 [USP'U]/ML
INJECTION, SOLUTION INTRAMUSCULAR; INTRAVENOUS PRN
Status: DISCONTINUED | OUTPATIENT
Start: 2025-08-05 | End: 2025-08-05 | Stop reason: SURG

## 2025-08-05 RX ORDER — EPHEDRINE SULFATE 50 MG/ML
INJECTION, SOLUTION INTRAVENOUS PRN
Status: DISCONTINUED | OUTPATIENT
Start: 2025-08-05 | End: 2025-08-05 | Stop reason: SURG

## 2025-08-05 RX ADMIN — BUPIVACAINE HYDROCHLORIDE IN DEXTROSE 1.6 ML: 7.5 INJECTION, SOLUTION SUBARACHNOID at 13:48

## 2025-08-05 RX ADMIN — OXYTOCIN 30 UNITS: 10 INJECTION, SOLUTION INTRAMUSCULAR; INTRAVENOUS at 14:11

## 2025-08-05 RX ADMIN — PHENYLEPHRINE HYDROCHLORIDE 50 MCG/MIN: 10 INJECTION INTRAVENOUS at 13:53

## 2025-08-05 RX ADMIN — EPHEDRINE SULFATE 10 MG: 50 INJECTION, SOLUTION INTRAVENOUS at 13:50

## 2025-08-05 RX ADMIN — CEFAZOLIN 2 G: 1 INJECTION, POWDER, FOR SOLUTION INTRAMUSCULAR; INTRAVENOUS at 13:50

## 2025-08-05 RX ADMIN — ONDANSETRON 8 MG: 2 INJECTION INTRAMUSCULAR; INTRAVENOUS at 13:44

## 2025-08-05 RX ADMIN — MORPHINE SULFATE 100 MCG: 0.5 INJECTION, SOLUTION EPIDURAL; INTRATHECAL; INTRAVENOUS at 13:48

## 2025-08-05 RX ADMIN — SODIUM CHLORIDE, SODIUM GLUCONATE, SODIUM ACETATE, POTASSIUM CHLORIDE AND MAGNESIUM CHLORIDE: 526; 502; 368; 37; 30 INJECTION, SOLUTION INTRAVENOUS at 13:43

## 2025-08-05 ASSESSMENT — LIFESTYLE VARIABLES
HAVE YOU EVER FELT YOU SHOULD CUT DOWN ON YOUR DRINKING: NO
EVER HAD A DRINK FIRST THING IN THE MORNING TO STEADY YOUR NERVES TO GET RID OF A HANGOVER: NO
ON A TYPICAL DAY WHEN YOU DRINK ALCOHOL HOW MANY DRINKS DO YOU HAVE: 0
EVER FELT BAD OR GUILTY ABOUT YOUR DRINKING: NO
HAVE PEOPLE ANNOYED YOU BY CRITICIZING YOUR DRINKING: NO
DO YOU DRINK ALCOHOL: NO
CONSUMPTION TOTAL: NEGATIVE
AVERAGE NUMBER OF DAYS PER WEEK YOU HAVE A DRINK CONTAINING ALCOHOL: 0
TOTAL SCORE: 0
HOW MANY TIMES IN THE PAST YEAR HAVE YOU HAD 5 OR MORE DRINKS IN A DAY: 0
TOTAL SCORE: 0
TOTAL SCORE: 0

## 2025-08-05 ASSESSMENT — PAIN DESCRIPTION - PAIN TYPE
TYPE: ACUTE PAIN

## 2025-08-05 ASSESSMENT — PAIN SCALES - GENERAL: PAIN_LEVEL: 0

## 2025-08-05 NOTE — CARE PLAN
The patient is Unstable - High likelihood or risk of patient condition declining or worsening    Shift Goals  Clinical Goals:patient has decrease in uterine activity  Patient Goals: stay pregnant  Family Goals: support    Progress made toward(s) clinical / shift goals:      Patient is not progressing towards the following goals:patient having fevers and chills, patient having increase in uterine activity. Patient to be delivered      Problem: Risk for Infection and Impaired Wound Healing  Goal: Patient will remain free from infection  8/5/2025 1209 by Shanon UMANA [place], R.N.  Outcome: Not Progressing  8/5/2025 1209 by Shanon UMANA [place], R.N.  Outcome: Progressing     Problem: Pain  Goal: Patient's pain will be alleviated or reduced to the patient’s comfort goal  Outcome: Not Progressing

## 2025-08-05 NOTE — PROGRESS NOTES
Had discussion with parents prior to delivery of twins. Multiple family members present in room.   Delivery planned due to concern for maternal infection.  Parents understand that with ROM at 13 weeks, prognosis for Twin A will likely be very poor, with a high risk that infant's pulmonary hypoplasia will be incompatible with life. Given this prognosis, parents have agreed that delivery resuscitation will be limited to intubation/PPV/oxygen, no chest compressions, no chest tubes.   Mother asked if she would be able to hold infant if resuscitation was not successful, I let her know we would do our best to have that happen.

## 2025-08-05 NOTE — PROGRESS NOTES
1900- Received report from Akanksha URBAN    1920- Reviewed POC with pt, verbalizes understanding anf states no questions at this time. Pt denies vaginal bleeding and contractions, states + fetal movement. Assessment completed.     0617- Pt reports feeling contractions, pt instructed to use restroom. Dr. Barcenas updated, received orders for 500mL of LR and to remain NPO.     0700- Report given to Gautam URBAN, care relinquished at this time.

## 2025-08-05 NOTE — CARE PLAN
Problem: Psychosocial - L&D  Goal: Patient's level of anxiety will decrease  Outcome: Progressing  Flowsheets (Taken 8/4/2025 1920)  Patient Behaviors: Anxious  Note: Collaborate with patient and family/caregiver to identify triggers and develop strategies to cope with anxiety. Implement calming techniques. Pharmacologic management per provider order. Encourage patient/family/care giver participation. Collaborate with interdisciplinary team including Psychologist or Behavioral Health Team as needed.     Problem: Risk for Infection and Impaired Wound Healing  Goal: Patient will remain free from infection  Outcome: Progressing  Note: Monitor wound, provide education on wound care treatment and hand hygiene.      The patient is Watcher - Medium risk of patient condition declining or worsening    Shift Goals  Clinical Goals: healthy mom, healthy baby  Patient Goals: stay pregnant  Family Goals: support    Progress made toward(s) clinical / shift goals: Progressing     stated

## 2025-08-05 NOTE — OR SURGEON
Immediate Post OP Note    PreOp Diagnosis: Diamniotic/dichorionic twin pregnancy at 29-2/7 weeks with PPROM twin A since 13 to 14 weeks, newly developing chorioamnionitis      PostOp Diagnosis: Same      Procedure(s):   SECTION, PRIMARY    Surgeon(s):  Stef Robbins M.D.    Anesthesiologist/Type of Anesthesia:  Anesthesiologist: Kaiden Wylie M.D./Spinal    Surgical Staff:  * No surgical staff found *    Specimens removed if any:  * No specimens in log *    Estimated Blood Loss: 500 cc    Findings: Apgars to be assigned by NICU staff, normal pelvis breech/transverse    Complications: None        2025 2:52 PM Stef Robbins M.D.

## 2025-08-05 NOTE — PROGRESS NOTES
1050 Report received from RAJNI Florez. Care assumed at this time. Akanksha DOUGHERTY at bedside helping prep patient for surgery. Patient reports feeling her contractions and is breathing through each one. Magnesium bolus started per orders.   POC discussed, questions answered.     1215 Dr. Castellanos at bedside to discuss POC.     1339 Patient arrival to OR 2 via hospital bed. Patient able to move self to OR table.     1410 Delivery of baby A  1411 Delivery of baby B   1435 NICU team leaves OR transporting baby A and B to NICU    1458 Patient arrival via rney to PACU bed 1. POC discussed, questions answered. Patient educated on lochia and bleeding expectations.     1600 Verbal order from Dr. Robbins to not administer pitocin infusion due to patient receiving 30 unit pitocin bolus from anesthesia.     1738 Patient transported in [facility] in apparent stable condition with all possessions to the postpartum unit. Report given to RAJNI Dey. Fundus and lochia assessed. Transfer of care at this time.

## 2025-08-05 NOTE — PROGRESS NOTES
0700- Report received from Raina URBAN. Plan of care discussed.    0715- assessment done. Patient states she is feeling period like contractions. States she is leaking fluid. Patient has had no change in fluid color or smell. Patient has no abdomen tenderness. Patient denies any VB. States positive fetal movement x 2.     0730- lab here to draw patient for 1 hour glucose test.     0815- patient called RN into room. States she is feeling nauseated and clammy after glucose test. Temp WNL. Patient given ice pack. Denies any other intervention at this time.    0830- Dr Ramos at bedside. Strip reviewed. Discussed plan of care with patient.     0900- Joana ANTHONY in department. Updated on patients lab results. Dr Ramos updated. order received for IV K+ replacement.     0950-  patient having increase in chills. States body is achy all over. Contractions every 2-3 minutes temp 101.5. Dr Ryan called and updated. Informed to notify Dr Ramos. Message left with Dr Ramos.      1030-  Dr Ramos updated order received for Magnesium to be started. Dr Robbins updated.     1050- Dr Robbins at bedside. Discussed plan of care with patient and the importance to be delivered. Consented patient. Magnesium 4 gram bolus started. Report given to Alycia COLEMAN RN.

## 2025-08-05 NOTE — OP REPORT
DATE OF SERVICE:  2025     PREOPERATIVE DIAGNOSES:  Intrauterine pregnancy at 29 and 2/7 weeks with   diamniotic/dichorionic twin pregnancy with  premature rupture of   membranes on twin A since 13 weeks' gestation and newly developing   chorioamnionitis.     POSTOPERATIVE DIAGNOSES:  Intrauterine pregnancy at 29 and 2/7 weeks with   diamniotic/dichorionic twin pregnancy with  premature rupture of   membranes on twin A since 13 weeks' gestation and newly developing   chorioamnionitis.     SURGEON:  Stef Robbins MD     ASSISTANT:  Nabeel Grewal DO     PROCEDURE:  Primary low transverse uterine  section.     ANESTHESIA:  Spinal.     ANESTHESIOLOGIST:  Kaiden Wylie MD     COMPLICATIONS:  None.     DRAINS:  Danielle catheter.     SPECIMENS:  Placenta, cord and membranes sent to pathology department.     INDICATIONS:  This patient is a 24-year-old female  1, para 0 with   diamniotic/dichorionic twin gestation, early rupture of membranes on twin A.    The patient received a course of steroids, and she remained without evidence   of chorioamnionitis for over 4 weeks until today started developing   contractions and fever with elevated white blood cell count, and   chorioamnionitis was suspected.     FINDINGS:  Apgar scores have yet to be assigned by NICU staff.  Twin A is   breech.  Twin B is transverse.  Minimal fluid around twin A and moderate fluid   around twin B, clear for both.  Normal pelvis.     DESCRIPTION OF PROCEDURE:  After adequately being counseled, the patient was   taken to the operating room and placed in the supine position.  Spinal   anesthetic was placed.  She was prepped and draped in the usual sterile   fashion.  A Pfannenstiel skin incision was made with the scalpel, taking down   the fascia.  The fascia was incised with the scalpel, and the fascial incision   taken laterally on both sides with Hummel scissors.  The rectus fascia was   dissected off the underlying  rectus muscles both superiorly and inferiorly,   and the rectus muscles were split in the midline.  The peritoneal cavity was   entered bluntly with digits, and the peritoneal incision taken superiorly and   inferiorly with Metzenbaum scissors.  Bladder blade was placed into the   bladder.  Next, bladder flap was developed sharply, and her bladder blade was   placed through the bladder.  Next, a low transverse uterine incision was made   with a scalpel.  The infant was delivered twin A by breech fetal extraction   without difficulty.  Umbilical cord was clamped and cut.  Infant handed off to   NICU staff.  Next, amniotic sac and twin B was ruptured using Allis clamp.    Clear fluid noted, and twin B was everted to breech and delivered through   breech fetal extraction without difficulty.  Umbilical cord clamped after   delayed cord clamping, and the infant handed off to awaiting NICU staff.    Placenta was removed from the uterine cavity.  The uterine cavity was cleansed   with a moist laparotomy sponge.  The uterus was closed using a running   locking stitch of #0 Vicryl, and several figure-of-eight stitches were placed   in the incision line to confirm hemostasis.  The pelvis was cleansed using   laparotomy sponges, and hemostasis confirmed in the hysterotomy site.    Peritoneal lining was closed using running nonlocking stitch of 0 Vicryl.  The   rectus muscles were reapproximated in the midline using interrupted stitch of   0 chromic, and the rectus fascia closed using running nonlocking stitch of 0   Vicryl.  Subcutaneous tissues were irrigated and suctioned. Electrocautery   used for hemostasis.  Several subcuticular stitches of 0 Vicryl were used to   reapproximate the subcutaneous tissues, and the skin was closed using surgical   staples.  Pressure dressing was applied, and the patient was taken to the   recovery room in good condition.  Clear yellow urine flowing through the   Danielle.  Both babies taken to  NICU, doing well.  Mother doing well   postoperatively.        ______________________________  MD TIGIST BERNARD/DESIREE    DD:  08/05/2025 15:01  DT:  08/05/2025 15:15    Job#:  [id number]

## 2025-08-05 NOTE — PROGRESS NOTES
"[facility] ANTEPARTUM PROGRESS NOTE;    Brandie Hunter is a 24 y.o. female  at 29w2d.    Patient Active Problem List    Diagnosis Date Noted    Dichorionic diamniotic twin pregnancy in second trimester 2025    Premature rupture of membranes in second trimester 2025    Other chest pain 10/03/2024    Shortness of breath 10/03/2024    Bradycardia 10/03/2024    Dyspepsia 2023    Muscle spasm 10/30/2023    Tension headache 10/30/2023    Gastroesophageal reflux disease without esophagitis 2022    Lymph node enlargement 2022    Environmental and seasonal allergies 2021    Hair loss 2020    Nausea 10/06/2020    Abnormal WBC count 2020    Anxiety 2020    Current moderate episode of major depressive disorder (HCC) 2020         SUBJECTIVE:  Patient seen and examined. She reports cramping and contractions every few minuties starting at  600am this morning. Pt received 500cc fluid bolus at 630am this morning. Symptoms are now more spaced out and improving. Has been on the monitor continuously since symptoms started.  Denies HA, change in vision, RUQ/epigastric pain, SOB, CP, fever, nausea/vomiting, edema or calf pain.     Contractions: cramping starting at 600am, at first every few minutes, now improving   Leaking of fluid: continues, clear, mild  Vaginal bleeding: denies  Fetal movement: present     Review of systems; denies fevers, chills, headaches, vision changes, dizziness, or abdominal pain      Physical examination;  /62   Pulse (!) 106   Temp 36.8 °C (98.2 °F) (Temporal)   Resp 16   Ht 1.575 m (5' 2\")   Wt 75.8 kg (167 lb 1.7 oz)   LMP 2025 (Approximate)   SpO2 98%   BMI 30.56 kg/m²   Appearance/Psychiatric: appears well and in NAD  Constitutional: The patient is well nourished.  Respiratory: Her respiratory effort is normal.  Gastrointestinal: Soft, gravid, non-tender  Extremities: no edema     Latest Reference Range & Units 25 " 07:44   WBC 4.8 - 10.8 K/uL 14.9 (H)   RBC 4.20 - 5.40 M/uL 3.87 (L)   Hemoglobin 12.0 - 16.0 g/dL 12.5   Hematocrit 37.0 - 47.0 % 37.2   MCV 81.4 - 97.8 fL 96.1   MCH 27.0 - 33.0 pg 32.3   MCHC 32.2 - 35.5 g/dL 33.6   RDW 35.9 - 50.0 fL 48.2   Platelet Count 164 - 446 K/uL 221   MPV 9.0 - 12.9 fL 9.9   Sodium 135 - 145 mmol/L 135   Potassium 3.6 - 5.5 mmol/L 3.1 (L)   Chloride 96 - 112 mmol/L 104   Co2 20 - 33 mmol/L 20   Anion Gap 7.0 - 16.0  11.0   Bun 8 - 22 mg/dL 6 (L)   Creatinine 0.50 - 1.40 mg/dL 0.51   GFR (CKD-EPI) >60 mL/min/1.73 m 2 134   Calcium 8.5 - 10.5 mg/dL 8.7   Correct Calcium 8.5 - 10.5 mg/dL 9.3   AST(SGOT) 12 - 45 U/L 16   ALT(SGPT) 2 - 50 U/L 14   Alkaline Phosphatase 30 - 99 U/L 120 (H)   Total Bilirubin 0.1 - 1.5 mg/dL 0.2   Albumin 3.2 - 4.9 g/dL 3.3   Total Protein 6.0 - 8.2 g/dL 6.0   Globulin 1.9 - 3.5 g/dL 2.7   A-G Ratio g/dL 1.2   Glucose, Post Dose 70 - 139 mg/dL 99       NST- reviewed by bulmaro Pearson. Infrequent contractions     Assessment  IUP AT 29w2d   Ms. Hunter is a 24 y.o. year old female at 29w2d admitted on 7/2/2025 due to PPROM of baby A in a di-di twin pregnancy. Twin A with possible pulmonary hypoplasia and FGR. S/P BMZ and latency ABX. Social work and Neonatology consult done 7/3. GBS negative 7/2.   Growth US 7/24   - Twin A: EFW: <1%; 642g; Breech maternal left; MVP: 1.9   - Twin B: EFW: 20%, 865g; Transverse maternal right; MVP: 5.0    Pt reports cramping/contractions starting this morning. Received 500cc fluid bolus. Symptoms are improving. There are infrequent contractions on the monitor. Pt requesting Tylenol for pain. Advised rest and laying on side.     Plan;  # Continue observation   # NST continuous for now to assess for contractions. If contractions worsen and labor is threatened will start magnesium sulfate. Remain NPO for now.   # Tylenol as requested   # Plan for repeat UA Dopplers tomorrow 8/6  # Passed 1h GTT drawn today at 99  # Potassium low on  labs this am at 3.1 mmol/L down from 3.7 on 7/30.   Will start Kcl 40mEq IV.       Carol العراقي P.A.-C.  [facility] Service   Obstetrics and Gynecology  8/5/2025     8:46 AM      Patient was seen in conjunction with Dr Christo GODFREY who was present for consult and agrees with the plan.      Called by RN, Both babies are having decelerations. Her Temp is 101.4.  WBC has increased from 10.0 to 14.o. She is having regular Ucs. Her uterus is tender on right side. This is C/W chorioamnionitis.    Recommendations:  Reassure the patient.  IV MGSo4 for Neuro-protection.  Recommend delivery by C/S. D/W  ( Laborist).    Christo Estevez ( [facility]).

## 2025-08-06 ASSESSMENT — PAIN DESCRIPTION - PAIN TYPE
TYPE: ACUTE PAIN
TYPE: ACUTE PAIN;SURGICAL PAIN
TYPE: ACUTE PAIN;SURGICAL PAIN

## 2025-08-06 NOTE — PROGRESS NOTES
POSTOPERATIVE  SECTION PROGRESS NOTE;        PATIENT ID:  NAME:  Brandie Hunter  MRN:               [id number]  YOB: 2000         24 y.o. female  at 29w3d POD# 1 s/p primary LTCS-diamniotic dichorionic twin pregnancy with P ROM twin A      Subjective: Doing well no complaints    Objective:    Vitals:    25 0300 25 0400 25 0500 25 0547   BP:    100/53   Pulse: 92 90 88 96   Resp: 18 18 18 18   Temp:    36.2 °C (97.1 °F)   TempSrc:    Temporal   SpO2: 95% 98% 97% 95%   Weight:       Height:         General: No acute distress, resting comfortably in bed.  HEENT: normocephalic, nontraumatic, PERRLA, EOMI  Cardiovascular: Heart RRR with no murmurs, rubs or gallops. Distal Pulses 2+  Respiratory: symmetric chest expansion, lungs CTA bilaterally with no wheezes rales or rhonci  Abdomen: soft, mildly tender around incision which is clean, dry and intact, fundus firm, +BS  Genitourinary: lochia light, denies excessive vaginal bleeding  Musculoskeletal: strength 5/5 in four extremities  Neuro: non focal with no numbness, tingling or changes in sensation      Recent Labs     25  0744 25  0159   WBC 14.9* 21.7*   RBC 3.87* 3.02*   HEMOGLOBIN 12.5 9.8*   HEMATOCRIT 37.2 28.8*   MCV 96.1 95.4   MCH 32.3 32.5   RDW 48.2 47.3   PLATELETCT 221 199   MPV 9.9 9.9     Recent Labs     25  0744   SODIUM 135   POTASSIUM 3.1*   CHLORIDE 104   CO2 20   GLUCOSE 99   BUN 6*       Current Meds:   Current Facility-Administered Medications   Medication Dose Frequency Provider Last Rate Last Admin    lactated ringers infusion   Continuous Christo Ramos M.D.        magnesium sulfate 40 GM/1000ML infusion  2 g/hr Continuous Christo Ramos M.D.   Stopped at 25 1334    NS infusion   Once Christo Ramos M.D.   Held at 25 1330    oxytocin (Pitocin) infusion bolus (for post delivery)  20 Units Once Villafuerte Richard, M.D.        oxytocin (Pitocin) injection 10 Units  10 Units Once PRN Villafuerte  VARGAS Ramos        miSOPROStol (Cytotec) tablet 800 mcg  800 mcg Once PRN Christo Ramos M.D.        methylergonovine (Methergine) injection 0.2 mg  0.2 mg Once PRN Christo Ramos M.D.        carboPROST (Hemabate) injection 250 mcg  250 mcg Once PRN Christo Ramos M.D.        NS infusion   Continuous Kaiden Wylie M.D.        acetaminophen (Tylenol) tablet 1,000 mg  1,000 mg Q6HR Kaiden Wylie M.D.   1,000 mg at 08/06/25 0400    ketorolac (Toradol) 15 MG/ML injection 15 mg  15 mg Q6HR Kaiden Wylie M.D.   15 mg at 08/06/25 0400    oxyCODONE immediate-release (Roxicodone) tablet 5 mg  5 mg Q4HRS PRN Kaiden Wylie M.D.   5 mg at 08/05/25 1938    oxyCODONE immediate release (Roxicodone) tablet 10 mg  10 mg Q4HRS PRN Kaiden Wylie M.D.        HYDROmorphone (Dilaudid) injection 0.2 mg  0.2 mg Q2HRS PRN Kaiden Wylie M.D.        HYDROmorphone (Dilaudid) injection 0.4 mg  0.4 mg Q2HRS PRN Kaiden Wylie M.D.        ePHEDrine injection 10 mg  10 mg Q5 MIN PRN Kaiden Wylie M.D.        ondansetron (Zofran) syringe/vial injection 4 mg  4 mg Q6HRS PRN Kaiden Wylie M.D.        diphenhydrAMINE (Benadryl) injection 12.5 mg  12.5 mg Q6HRS PRN Kaiden Wylie M.D.        diphenhydrAMINE (Benadryl) injection 12.5 mg  12.5 mg Q6HRS PRN Kaiden Wylie M.D.        Or    diphenhydrAMINE (Benadryl) injection 25 mg  25 mg Q6HRS PRN Kaiden Wylie M.D.        Or    naloxone HCl (Narcan) 20 mg in  mL infusion  0.4 mg/hr Q6HRS PRN Kaiden Wylie M.D.        lactated ringers infusion  2,000 mL PRN Stef Robbins M.D.        ibuprofen (Motrin) tablet 800 mg  800 mg Q8HRS Stef Robbins M.D.        Followed by    [START ON 8/9/2025] ibuprofen (Motrin) tablet 800 mg  800 mg Q8HRS PRN Stef Robbins M.D.        acetaminophen (Tylenol) tablet 1,000 mg  1,000 mg Q6HRS Stef Robbins M.D.        Followed by    [START ON 8/9/2025] acetaminophen (Tylenol) tablet 1,000 mg  1,000 mg Q6HRS PRN Stef Robbins M.D.        oxyCODONE  immediate-release (Roxicodone) tablet 5 mg  5 mg Q4HRS PRN Stef Robbins M.D.        oxyCODONE immediate release (Roxicodone) tablet 10 mg  10 mg Q4HRS PRN Stef Robbins M.D.        ondansetron (Zofran) syringe/vial injection 4 mg  4 mg Q6HRS PRN Stef Robbins M.D.        Or    ondansetron (Zofran ODT) dispertab 4 mg  4 mg Q6HRS PRN Stef Robbins M.D.        diphenhydrAMINE (Benadryl) tablet/capsule 25 mg  25 mg Q6HRS PRN Stef Robbins M.D.        Or    diphenhydrAMINE (Benadryl) injection 25 mg  25 mg Q6HRS PRN Stef Robbins M.D.        docusate sodium (Colace) capsule 100 mg  100 mg BID PRN Stef Robbins M.D.        tetanus-dipth-acell pertussis (Tdap) inj 0.5 mL  0.5 mL Once PRN Stef Robbins M.D.        calcium carbonate (Tums) chewable tab 1,000 mg  1,000 mg Q6HRS PRN Stef Robbins M.D.        simethicone (Mylicon) chewable tablet 125 mg  125 mg 4X/DAY PRN Stef Robbins M.D.        potassium chloride SA (Kdur) tablet 40 mEq  40 mEq Once Mariaelena Ryan M.D.        albuterol inhaler 2 Puff  2 Puff Q4HRS PRN [place] You D.O.        aspirin EC tablet 81 mg  81 mg DAILY ASTON ChandlerO.   81 mg at 08/06/25 0530    prenatal plus vitamin (Stuartnatal 1+1) 27-1 MG tablet 1 Tablet  1 Tablet DAILY [place] You D.O.   1 Tablet at 08/04/25 1916   Last reviewed on 8/5/2025  4:36 PM by Alycia Fontanez R.N.         Assessment:  24 y.o. female  at 29w3d POD# 1 s/p primary LTCS-Di Di [id number] in the NICU    Plan:   Ambulate, TOBY Danielle, advance diet  Discharge to home POD 4    Stef Robbins MD

## 2025-08-06 NOTE — CARE PLAN
The patient is Stable - Low risk of patient condition declining or worsening    Shift Goals  Clinical Goals: Lochia WDl, Proper wound healing, Pain mngt, Bonding  Patient Goals: stay pregnant  Family Goals: support    Progress made toward(s) clinical / shift goals:      Problem: Psychosocial - L&D  Goal: Patient's level of anxiety will decrease  Description: Target End Date:  1-3 days or as soon as patient condition allows    1.  Collaborate with patient and family/caregiver to identify triggers and develop strategies to cope with anxiety  2.  Implement stimuli reduction, calming techniques  3.  Pharmacologic management per provider order  4.  Encourage patient/family/care giver participation  5.  Collaborate with interdisciplinary team including Psychologist or Behavioral Health Team as needed  Outcome: Progressing     Problem: Pain - Standard  Goal: Alleviation of pain or a reduction in pain to the patient’s comfort goal  Description: Target End Date:  Prior to discharge or change in level of care    Document on Vitals flowsheet    1.  Document pain using the appropriate pain scale per order or unit policy  2.  Educate and implement non-pharmacologic comfort measures (i.e. relaxation, distraction, massage, cold/heat therapy, etc.)  3.  Pain management medications as ordered  4.  Reassess pain after pain med administration per policy  5.  If opiods administered assess patient's response to pain medication is appropriate per POSS sedation scale  6.  Follow pain management plan developed in collaboration with patient and interdisciplinary team (including palliative care or pain specialists if applicable)  Outcome: Progressing     Problem: Knowledge Deficit - Standard  Goal: Patient and family/care givers will demonstrate understanding of plan of care, disease process/condition, diagnostic tests and medications  Description: Target End Date:  1-3 days or as soon as patient condition allows    Document in Patient  Education    1.  Patient and family/caregiver oriented to unit, equipment, visitation policy and means for communicating concern  2.  Complete/review Learning Assessment  3.  Assess knowledge level of disease process/condition, treatment plan, diagnostic tests and medications  4.  Explain disease process/condition, treatment plan, diagnostic tests and medications  Outcome: Progressing       Patient is not progressing towards the following goals:

## 2025-08-06 NOTE — PROGRESS NOTES
0715 Assessment completed. Lochia light, fundus firm. Plan of care reviewed. Declines pain intervention at this time, will call if pain intervention needed.

## 2025-08-06 NOTE — LACTATION NOTE
Initial Lactation Consultation:    Met with Brandie to provide lactation support following the delivery of her twins yesterday afternoon at 29 weeks gestation. They are being cared for in the NICU.    Brandie reports that she pumped her breasts once last night, for drops of colostrum. She is planning to postpone her next pump session until this evening, at which time she plans on beginning regular pump sessions.    Education provided regarding the milk making process, inclusive of the importance of early, regular pump sessions in the development of a mature milk supply.     Brandie is encouraged to call for flange fitting and pump assessment when she is ready to resume breast pumping.

## 2025-08-06 NOTE — CARE PLAN
The patient is Stable - Low risk of patient condition declining or worsening    Shift Goals  Clinical Goals: Patiet VSS; Lochia WDL; Pain WDL  Patient Goals: stay pregnant  Family Goals: support    Progress made toward(s) clinical / shift goals:    Problem: Early Mobilization - Post Surgery  Goal: Early mobilization post surgery  Outcome: Progressing     Problem: Bowel Elimination - Post Surgical  Goal: Patient will resume regular bowel sounds and function with no discomfort or distention  Outcome: Progressing     Problem: Respiratory/Oxygenation Function Post-Surgical  Goal: Patient will achieve/maintain normal respiratory rate/effort  Outcome: Progressing     Problem: Infection - Postpartum  Goal: Postpartum patient will be free of signs and symptoms of infection  Outcome: Progressing     Problem: Altered Physiologic Condition  Goal: Patient physiologically stable as evidenced by normal lochia, palpable uterine involution and vitals within normal limits  Outcome: Progressing     Problem: Psychosocial - Postpartum  Goal: Patient will verbalize and demonstrate effective bonding and parenting behavior  Outcome: Progressing     Problem: Knowledge Deficit - Postpartum  Goal: Patient will verbalize and demonstrate understanding of self and infant care  Outcome: Progressing

## 2025-08-06 NOTE — PROGRESS NOTES
2000- Assessment completed. Pt c/o pain at this time pain interventions given per MAR. Fundus firm, lochia light. VS Stable. POC reviewed with MOB: pain control, lochia, hydration, and ambulation. Verbalized understanding. No further questions at this time. Patient educated on non-emergency and emergency call lights. Call light within reach.    2300- MOB instructed and educated on double electric hospital grade breast pump. All settings reviewed with MOB: CMP at 80 for 2 minutes then decrease to 50-60, suction at the highest comfortable setting for patient comfort, and time 15 minutes. Provided MOB with washing instructions for pump parts and provided soap and green sponges. All questions/concerns regarding breast pump and feeding plan have been answered at this time. Will continue to make self available should any other questions/concerns arise.

## 2025-08-06 NOTE — PROGRESS NOTES
PT arrived to S[facility] on gurney. POC discussed and assessment complete, VSS. PT instructed on visitor policy, and call light.

## 2025-08-07 ASSESSMENT — PAIN DESCRIPTION - PAIN TYPE
TYPE: SURGICAL PAIN

## 2025-08-07 NOTE — CARE PLAN
The patient is Stable - Low risk of patient condition declining or worsening    Shift Goals  Clinical Goals: pain control, lochia WDL, mcgowan  Patient Goals: stay pregnant  Family Goals: support    Progress made toward(s) clinical / shift goals:      Problem: Knowledge Deficit - Postpartum  Goal: Patient will verbalize and demonstrate understanding of self and infant care  Description: Target End Date:  1-3 days or as soon as patient condition allows    Document in Patient Education    1.  Assess patient and knowledge of self and infant care  2.  Educate patient verbally, by demonstration and written material  Outcome: Progressing     Problem: Psychosocial - Postpartum  Goal: Patient will verbalize and demonstrate effective bonding and parenting behavior  Description: Target End Date:  1 to 4 days    1.  Assess patient for anxiety or apprehension regarding parenting role  2.  Provide emotional support and encouragement to patient/family/caregiver  Outcome: Progressing     Problem: Bowel Elimination - Post Surgical  Goal: Patient will resume regular bowel sounds and function with no discomfort or distention  Description: Target End Date:  1 - 3 days post op    1.  Monitor bowel sounds every shift  2.  Assess for flatus  3.  Monitor for abdominal distention  4.  Monitor for abdominal discomfort  5.  Initiate bowel protocol on post op day 2 if no bowel movement  6.  Note date of last BM  7.  Educate about diet, fluid intake, medication and activity to promote bowel function  8.  Educate signs and symptoms of constipation and interventions to implement  9.  Pharmacologic bowel management per provider order  10. Regular toileting schedule  11. Upright position for toileting  12. High fiber diet  13. Encourage hydration  14. Collaborate with Clinical Dietician  15. Care and maintenance of ostomy if applicable  Outcome: Progressing     Problem: Early Mobilization - Post Surgery  Goal: Early mobilization post  surgery  Description: Target End Date:  Post op day 1    1.  Out of bed on post op day 0, unless contraindicated  2.  Ambulate three times a day post-op day one, advance activity as tolerated  3.  Up in chair for meals  4.  Pain management adequate to allow progressive mobilization  5.  Don/doff brace/corset as ordered  Outcome: Progressing       Patient is not progressing towards the following goals:

## 2025-08-07 NOTE — LACTATION NOTE
Follow up lactation consultation:    Met with Brandie to provide pumping support.    Pumping Evaluated. Settings and pump use reviewed and demonstrated. 21 mm flanges fit appropriately and patient reports comfort @ 25% suction. Pump at speed of 80cpm for 2 min and then turn down to 60cpm for a total of 15min every 2-3hrs. Follow with 2-3 minutes hand expression. Anticipatory guidance provided regarding typical volumes of colostrum expressed in the first 1-3 days. 4mL colostrum expressed.    Reviewed importance of consistent pumping in the development of a mature breast milk supply.     Discussed guidelines for NICU milk collection, storage, and labelling.    Brandie reports that she has not yet ordered her breast pump from her insurance company. She is encouraged to contact them to how to obtain this. We reviewed breast pump rental, and the possibility of using the hospital grade pump at [facility]. Patient is not currently a member of her community WIC program, but has their contact information, and will be following up regarding enrollment.    Brandie is advised of access to lactation staff for entirety of her twin's NICU stay. She agrees to request lactation re-consultation with any developing lactation related questions or concerns.

## 2025-08-07 NOTE — PROGRESS NOTES
Assumed care. Assessment complete. VSS. Fundus firm, lochia scant. Incision CDI with staples NALINI. Pt ambulating and voiding without difficulty. Pt would like pain medication as needed. Call light within reach. Will continue to monitor.

## 2025-08-07 NOTE — PROGRESS NOTES
Obstetrics & Gynecology Post-Delivery Progress Note    Date of Service      24 y.o.  s/p  for twin gestation, PPROM, chorioamnionitis.   Delivery date: 2025    Events  The patient's pregnancy has been complicated by a di/di twin gestation. Twin A PPROM at 13 weeks. She was delivered due to newly developing chorioamnionitis. A  section was performed due to fever and increased white count. She complains of neck and shoulder pain that is controlled with medications. She reports pain in the right side of her incision that is also controlled with medication.     Subjective  Pain: Yes,  controlled  Bleeding: lochia minimal  PO's: taking regular diet  Voiding: without difficulty  Ambulating: yes  Passing flatus: Yes  Feeding: breastfeeding well    Objective  Temp:  [36.3 °C (97.3 °F)-37.4 °C (99.4 °F)] 36.3 °C (97.3 °F)  Pulse:  [] 81  Resp:  [17-18] 17  BP: ()/(55-83) 115/65  SpO2:  [95 %-97 %] 97 %    Physical Exam  General: well  Chest/Breasts: nipples intact   Abdomen: soft  Fundus: firm  Incision: dressing clean, dry, intact  Perineum: well approximated  Extremities: symmetric, calves nontender    Recent Labs     25  0744 25  0159   WBC 14.9* 21.5*  21.7*   RBC 3.87* 3.06*  3.02*   HEMOGLOBIN 12.5 9.7*  9.8*   HEMATOCRIT 37.2 28.6*  28.8*   MCV 96.1 93.5  95.4   MCH 32.3 31.7  32.5   RDW 48.2 46.7  47.3   PLATELETCT 221 223  199   MPV 9.9 11.2  9.9   NEUTSPOLYS  --  66.70   LYMPHOCYTES  --  5.10*   MONOCYTES  --  2.60   EOSINOPHILS  --  0.80   BASOPHILS  --  0.80   RBCMORPHOLO  --  Normal       Assessment/Plan  Brandie Hunter is a 24 y.o.yo  s/p postpartum day #2  s/p  for chorioamnionitis and twin gestation.     1. Post care: meeting all goals  2. Hemodynamics: stable  3. Pain: controlled  4. Rh+, Rubella Immune  5. Method of Feeding: plans to breastfeed  6. Method of Contraception: condoms  7. Disposition: likely home postpartum day  3  8. Meeting with  today to discuss staying at [facility].     VTE prophylaxis: none indicated

## 2025-08-07 NOTE — PROGRESS NOTES
2000- Assessment completed. Pt denies of pain at this time. Will notify this RN when needing pain interventions. Fundus firm, lochia scant. VS Stable. POC reviewed with MOB: pain control, lochia, hydration, and ambulation. Verbalized understanding. No further questions at this time. Patient educated on non-emergency and emergency call lights. Call light within reach.

## 2025-08-08 ASSESSMENT — PAIN DESCRIPTION - PAIN TYPE
TYPE: SURGICAL PAIN
TYPE: ACUTE PAIN
TYPE: SURGICAL PAIN
TYPE: ACUTE PAIN
TYPE: ACUTE PAIN;SURGICAL PAIN
TYPE: SURGICAL PAIN

## 2025-08-08 ASSESSMENT — EDINBURGH POSTNATAL DEPRESSION SCALE (EPDS)
I HAVE BEEN SO UNHAPPY THAT I HAVE BEEN CRYING: ONLY OCCASIONALLY
THINGS HAVE BEEN GETTING ON TOP OF ME: NO, MOST OF THE TIME I HAVE COPED QUITE WELL
I HAVE BEEN ABLE TO LAUGH AND SEE THE FUNNY SIDE OF THINGS: AS MUCH AS I ALWAYS COULD
I HAVE BEEN ANXIOUS OR WORRIED FOR NO GOOD REASON: YES, SOMETIMES
I HAVE BLAMED MYSELF UNNECESSARILY WHEN THINGS WENT WRONG: YES, SOME OF THE TIME
THE THOUGHT OF HARMING MYSELF HAS OCCURRED TO ME: NEVER
I HAVE FELT SCARED OR PANICKY FOR NO GOOD REASON: NO, NOT MUCH
I HAVE BEEN SO UNHAPPY THAT I HAVE HAD DIFFICULTY SLEEPING: NOT VERY OFTEN
I HAVE LOOKED FORWARD WITH ENJOYMENT TO THINGS: AS MUCH AS I EVER DID
I HAVE FELT SAD OR MISERABLE: YES, QUITE OFTEN

## 2025-08-08 NOTE — CARE PLAN
The patient is Stable - Low risk of patient condition declining or worsening    Shift Goals  Clinical Goals: VSS; Lochia WDL; Pain WDL  Patient Goals: puming/rest  Family Goals: support    Progress made toward(s) clinical / shift goals:    Problem: Pain - Standard  Goal: Alleviation of pain or a reduction in pain to the patient’s comfort goal  Outcome: Progressing     Problem: Knowledge Deficit - Standard  Goal: Patient and family/care givers will demonstrate understanding of plan of care, disease process/condition, diagnostic tests and medications  Outcome: Progressing     Problem: Knowledge Deficit - Postpartum  Goal: Patient will verbalize and demonstrate understanding of self and infant care  Outcome: Progressing     Problem: Psychosocial - Postpartum  Goal: Patient will verbalize and demonstrate effective bonding and parenting behavior  Outcome: Progressing     Problem: Altered Physiologic Condition  Goal: Patient physiologically stable as evidenced by normal lochia, palpable uterine involution and vitals within normal limits  Outcome: Progressing     Problem: Infection - Postpartum  Goal: Postpartum patient will be free of signs and symptoms of infection  Outcome: Progressing     Problem: Respiratory/Oxygenation Function Post-Surgical  Goal: Patient will achieve/maintain normal respiratory rate/effort  Outcome: Progressing     Problem: Bowel Elimination - Post Surgical  Goal: Patient will resume regular bowel sounds and function with no discomfort or distention  Outcome: Progressing     Problem: Early Mobilization - Post Surgery  Goal: Early mobilization post surgery  Outcome: Progressing

## 2025-08-08 NOTE — LACTATION NOTE
Follow-up LC visit, mother reports she is now pumping up to 20-25mls and feels her milk is transitioning, reports her breasts feel comfortable and non-engorged. She verbalizes appropriate breast pump settings. She reports she plans to purchase an electric breast pump for home use, is aware of other pump options as discussed with her LC yesterday. Plan to continue breast pumping at least 8 times each 24 hours. Parents deny questions/concerns.

## 2025-08-08 NOTE — PROGRESS NOTES
Obstetrics & Gynecology Post-Delivery Progress Note    Date of Service      24 y.o.  s/p  for  twins and chorioamnionitis  Delivery date: 2025    Events  No events overnight for patient. Twin A not doing well in NICU.    Subjective  Pain: Yes,  controlled  Bleeding: lochia minimal  PO's: taking regular diet  Voiding: without difficulty  Ambulating: yes  Passing flatus: Yes  Feeding: Breastpumping    Objective  Temp:  [36.1 °C (97 °F)-36.7 °C (98.1 °F)] 36.3 °C (97.4 °F)  Pulse:  [75-88] 86  Resp:  [17-18] 18  BP: (102-120)/(50-72) 120/72  SpO2:  [95 %-99 %] 97 %    Physical Exam  General: well and resting  Chest/Breasts: nipples intact   Abdomen: normal bowel sounds, soft with staples in place  Fundus: firm and at umbilicus  Incision: healing well  Perineum: deferred  Extremities: symmetric and 1+ edema, calves nontender    Recent Labs     25  0744 25  0159   WBC 14.9* 21.5*  21.7*   RBC 3.87* 3.06*  3.02*   HEMOGLOBIN 12.5 9.7*  9.8*   HEMATOCRIT 37.2 28.6*  28.8*   MCV 96.1 93.5  95.4   MCH 32.3 31.7  32.5   RDW 48.2 46.7  47.3   PLATELETCT 221 223  199   MPV 9.9 11.2  9.9   NEUTSPOLYS  --  66.70   LYMPHOCYTES  --  5.10*   MONOCYTES  --  2.60   EOSINOPHILS  --  0.80   BASOPHILS  --  0.80   RBCMORPHOLO  --  Normal       Assessment/Plan  Brandie Hunter is a 24 y.o.yo  s/p postop day #3  s/p  for  twins and chorioamnionitis    1. Post care: meeting all goals  2. Hemodynamics: repeat CBC ordered. Will start iron if appropriate  3. Pain: controlled  4. Rh+, Rubella Immune  5. Method of Feeding: plans to breastfeed  6. Method of Contraception: not assessed  7. Disposition: likely home postop day 4 social involved for setting up [facility] stay    VTE prophylaxis: none indicated

## 2025-08-08 NOTE — CARE PLAN
The patient is Stable - Low risk of patient condition declining or worsening    Shift Goals  Clinical Goals: Lochia WNL, VSS  Patient Goals: puming/rest  Family Goals: support    Progress made toward(s) clinical / shift goals:    Problem: Pain - Standard  Goal: Alleviation of pain or a reduction in pain to the patient’s comfort goal  Outcome: Progressing  Note: Patient reports pain is well managed with scheduled medications as well as as needed oxycodone and heat packs. Oxycodone utilized once with prior shift and not at all this shift. Pt verbalizes understanding of when to call.      Problem: Knowledge Deficit - Postpartum  Goal: Patient will verbalize and demonstrate understanding of self and infant care  Outcome: Progressing  Note: Reinforced expected lochia color and amount, s/s of infection, s/s of blood clots, s/s of pre-eclampsia that can occur during the postpartum period, and self care.   Infants in NICU at this time, education on pumping for infants in NICU. Pt verbalizes and demonstrates understanding.      Problem: Altered Physiologic Condition  Goal: Patient physiologically stable as evidenced by normal lochia, palpable uterine involution and vitals within normal limits  Outcome: Progressing  Note: VSS, lochia WNL, uterus is palpable and involuting appropriately.        Patient is not progressing towards the following goals:

## 2025-08-08 NOTE — PROGRESS NOTES
1900: Report received from RAJNI Martínez. Assumed care of pt.     1930: Assessment complete. Education on call light and emergency light use. Reinforced expected lochia color and amount and when to call RN. Education on hospital grade electric breast pump, pt reports no questions at this time regarding pumping. Encouraged to pump 8-12 times/24 hours and to add 2-3 minutes of hand expression after pumping, to encourage milk production. Encouraged pt to watch [employer] frenting hand expression/breastfeeding videos, as well as birth and beyond candice. Pt does report intermittent headache that is relieved by pain medication, denies RUQ pain, vision changes, edema is mild and pt reports is improving, encouraged to call with any changes. Will continue with q4 hour vital signs. Reviewed scheduled medications and as needed medications. Plan of care reviewed, all questions answered at this time.

## 2025-08-09 ENCOUNTER — PHARMACY VISIT (OUTPATIENT)
Dept: PHARMACY | Facility: MEDICAL CENTER | Age: 25
End: 2025-08-09
Payer: MEDICARE

## 2025-08-09 PROCEDURE — RXMED WILLOW AMBULATORY MEDICATION CHARGE: Performed by: STUDENT IN AN ORGANIZED HEALTH CARE EDUCATION/TRAINING PROGRAM

## 2025-08-09 ASSESSMENT — PAIN DESCRIPTION - PAIN TYPE
TYPE: ACUTE PAIN;SURGICAL PAIN
TYPE: SURGICAL PAIN
TYPE: ACUTE PAIN;SURGICAL PAIN
TYPE: ACUTE PAIN;SURGICAL PAIN
TYPE: SURGICAL PAIN
TYPE: ACUTE PAIN

## 2025-08-09 NOTE — DISCHARGE SUMMARY
Discharge Summary:     Date of Admission: 2025  Date of Discharge: 25      Admitting diagnosis:    1. Pregnancy @ 24w3d  2.  prelabor rupture of membranes (twin A)  3. Di/di TIUP  4. 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 Status post  for fetal distress.  2. Di/di TIUP  3. Viable male neonates    Past Medical History[1]  OB History    Para Term  AB Living   1 1  1  2   SAB IAB Ectopic Molar Multiple Live Births       1 2      # Outcome Date GA Lbr Samir/2nd Weight Sex Type Anes PTL Lv   1A  25 29w2d  1.323 kg (2 lb 14.7 oz) M CS-LTranv Spinal Y LIANET   1B  25 29w2d   M CS-LTranv Spinal Y LIANET     Past Surgical History[2]  Patient has no known allergies.    Problem List[3]    Hospital Course:   Pt is a 24 y.o. now  who presented for admission for  prelabor rupture of membranes. At time of presentation, it was suspected she'd experienced PPROM of Twin A at 13 weeks. She had previously been seen/evaluated at [facility] and counseled extensively about prognosis and anticipated outcome (including, but not limited to, pulmonary hypoplasia of twin A). She elected to proceed with expectant management. On admission, she received a course of magnesium for fetal neuroprotection and betamethasone and NICU was consulted. She remained admitted to Antepartum unit until 29w2d, at which time patient developed painful contractions and febrile illness - concern arose for chorioamnionitis.  section was recommended and Magnesium for fetal neuroprotection resumed.  section was routine. APGARs of twin A: 2/7, APGARs of twin B: 7/8. EBL 500cc.      Postpartum course notable for early ambulation, well managed pain, tolerance of diet, spontaneous voiding, and appropriate feeding of infant. She has remained afebrile and blood pressure has been well controlled. All maternal questions and concerns addressed        Physical  Exam:  Temp:  [36.2 °C (97.1 °F)-37 °C (98.6 °F)] 36.2 °C (97.1 °F)  Pulse:  [78-89] 84  Resp:  [16-18] 16  BP: (111-123)/(68-80) 111/77  SpO2:  [95 %-98 %] 95 %  Physical Exam  GEN: NAD, speaking full sentences without distress  HEENT: NCAT, PERRLA, moist oral mucosa, anicteric, without pallor  CVS: Extremities warm and well perfused  LUNGS: Unlabored breathing  ABD: Soft, nontender, nondistended  INCISION: clean/dry/intact with staples in place; staples removed today and steristrips placed  BACK: No CVAT  EXT: No cyanosis or edema  NEURO: No focal deficits      Current Facility-Administered Medications   Medication Dose    acetaminophen (Tylenol) tablet 1,000 mg  1,000 mg    Followed by    acetaminophen (Tylenol) tablet 1,000 mg  1,000 mg    ibuprofen (Motrin) tablet 800 mg  800 mg    Followed by    ibuprofen (Motrin) tablet 800 mg  800 mg    oxytocin (Pitocin) injection 10 Units  10 Units    miSOPROStol (Cytotec) tablet 800 mcg  800 mcg    methylergonovine (Methergine) injection 0.2 mg  0.2 mg    carboPROST (Hemabate) injection 250 mcg  250 mcg    lactated ringers infusion  2,000 mL    oxyCODONE immediate-release (Roxicodone) tablet 5 mg  5 mg    oxyCODONE immediate release (Roxicodone) tablet 10 mg  10 mg    ondansetron (Zofran) syringe/vial injection 4 mg  4 mg    Or    ondansetron (Zofran ODT) dispertab 4 mg  4 mg    diphenhydrAMINE (Benadryl) tablet/capsule 25 mg  25 mg    Or    diphenhydrAMINE (Benadryl) injection 25 mg  25 mg    docusate sodium (Colace) capsule 100 mg  100 mg    tetanus-dipth-acell pertussis (Tdap) inj 0.5 mL  0.5 mL    calcium carbonate (Tums) chewable tab 1,000 mg  1,000 mg    simethicone (Mylicon) chewable tablet 125 mg  125 mg    albuterol inhaler 2 Puff  2 Puff    prenatal plus vitamin (Stuartnatal 1+1) 27-1 MG tablet 1 Tablet  1 Tablet       Recent Labs     08/08/25  0751   WBC 10.6   RBC 3.38*   HEMOGLOBIN 10.6*   HEMATOCRIT 32.5*   MCV 96.2   MCH 31.4   MCHC 32.6   RDW 49.0    PLATELETCT 235   MPV 9.7         Activity/ Discharge Instructions::   Discharge to home  Advance low impact activity as tolerated  No heavy lifting x6 weeks  Call or come to ED for: heavy vaginal bleeding, fever >100.4, severe abdominal pain, severe headache, chest pain, shortness of breath,  N/V, incisional drainage, or other concerns.       Follow up:  [facility] in 1 week for incision check for  delivery.     Discharge Meds:   Current Outpatient Medications   Medication Sig Dispense Refill    acetaminophen (TYLENOL) 500 MG Tab Take 2 Tablets by mouth every 8 hours for 15 days. 90 Tablet 0    docusate sodium 100 MG Cap Take 100 mg by mouth 2 times a day as needed for Constipation. 60 Capsule 0    ibuprofen (MOTRIN) 800 MG Tab Take 1 Tablet by mouth every 8 hours as needed for Mild Pain or Moderate Pain. 30 Tablet 0    oxyCODONE immediate-release (ROXICODONE) 5 MG Tab Take 1 Tablet by mouth every four hours as needed for Severe Pain for up to 3 days. 7 Tablet 0    simethicone (MYLICON) 125 MG chewable tablet Chew 1 Tablet 4 times a day as needed for Flatulence. 120 Tablet 0       Annie Romero M.D.             [1]   Past Medical History:  Diagnosis Date    Abdominal discomfort 2022    Anxiety     Bacterial vaginosis 2022    Depression     Head ache     Heart murmur     as a baby    High risk heterosexual behavior 2021    Ingrown toenail 10/6/2020    Irregular periods 2020    Menarche 2014    Nausea 10/6/2020    Swollen lymph nodes 2022    Vaginal discharge 10/28/2020    Weight loss 2020   [2]   Past Surgical History:  Procedure Laterality Date    PRIMARY C SECTION N/A 2025    Procedure:  SECTION, PRIMARY;  Surgeon: Stef Robbins M.D.;  Location: [facility] LAB[facility] DELIV[facility];  Service: Labor and Delivery   [3]   Patient Active Problem List  Diagnosis    Anxiety    Current moderate episode of major depressive disorder (HCC)    Abnormal WBC count     Nausea    Hair loss    Environmental and seasonal allergies    Lymph node enlargement    Gastroesophageal reflux disease without esophagitis    Muscle spasm    Tension headache    Dyspepsia    Other chest pain    Shortness of breath    Bradycardia    Premature rupture of membranes in second trimester    Dichorionic diamniotic twin pregnancy in second trimester

## 2025-08-09 NOTE — CONSULTS
Evaluated Brandie's use of breast pump to include frequency, duration, suction and speed settings, as well as flange fit and comfort.She is using the 21 mm flanges.  Discussed leaving kit in NICU as she plans to pump in NICU when attending bedside. She plans to purchase a breast pump rather than rent one due to financial constraints. We discussed avoiding the wearable type and ensuring that it is a double electric.   Outlined how to schedule bedside consultation in NICU for lactation assistance. Recommended obtaining milk storage bags for milk collection. She is aware of needing to do so.

## 2025-08-09 NOTE — CARE PLAN
The patient is Stable - Low risk of patient condition declining or worsening    Shift Goals  Clinical Goals: VSS,light lochia,incision dressing clean,dry and intact  Patient Goals: visit infants in NICU,pain control,rest,use breast pump  Family Goals: support    Progress made toward(s) clinical / shift goals:  progressing    Patient is not progressing towards the following goals:

## 2025-08-09 NOTE — DISCHARGE INSTRUCTIONS

## 2025-08-10 NOTE — PROGRESS NOTES
Patient educated on discharge orders, education and follow up appointments. VSS, no signs of distress, all questions answered, discharge papers signed and scanned.

## 2025-08-15 ENCOUNTER — GYNECOLOGY VISIT (OUTPATIENT)
Dept: OBGYN | Facility: CLINIC | Age: 25
End: 2025-08-15
Payer: COMMERCIAL

## 2025-08-15 VITALS
SYSTOLIC BLOOD PRESSURE: 122 MMHG | WEIGHT: 156.2 LBS | HEIGHT: 63 IN | HEART RATE: 83 BPM | BODY MASS INDEX: 27.68 KG/M2 | DIASTOLIC BLOOD PRESSURE: 85 MMHG

## 2025-08-15 DIAGNOSIS — Z51.89 VISIT FOR WOUND CHECK: Primary | ICD-10-CM

## 2025-08-15 PROCEDURE — 3074F SYST BP LT 130 MM HG: CPT | Performed by: STUDENT IN AN ORGANIZED HEALTH CARE EDUCATION/TRAINING PROGRAM

## 2025-08-15 PROCEDURE — 99024 POSTOP FOLLOW-UP VISIT: CPT | Performed by: STUDENT IN AN ORGANIZED HEALTH CARE EDUCATION/TRAINING PROGRAM

## 2025-08-15 PROCEDURE — 3079F DIAST BP 80-89 MM HG: CPT | Performed by: STUDENT IN AN ORGANIZED HEALTH CARE EDUCATION/TRAINING PROGRAM

## 2025-08-15 ASSESSMENT — FIBROSIS 4 INDEX: FIB4 SCORE: 0.42

## 2025-08-15 ASSESSMENT — LIFESTYLE VARIABLES: HOW OFTEN DO YOU HAVE A DRINK CONTAINING ALCOHOL: PATIENT DECLINED

## 2025-08-26 ENCOUNTER — HOSPITAL ENCOUNTER (EMERGENCY)
Facility: MEDICAL CENTER | Age: 25
End: 2025-08-26
Attending: EMERGENCY MEDICINE
Payer: COMMERCIAL

## 2025-08-26 VITALS
TEMPERATURE: 98.2 F | WEIGHT: 160.05 LBS | RESPIRATION RATE: 16 BRPM | DIASTOLIC BLOOD PRESSURE: 64 MMHG | HEART RATE: 80 BPM | SYSTOLIC BLOOD PRESSURE: 112 MMHG | OXYGEN SATURATION: 98 % | BODY MASS INDEX: 28.36 KG/M2 | HEIGHT: 63 IN

## 2025-08-26 DIAGNOSIS — M21.371 RIGHT FOOT DROP: Primary | ICD-10-CM

## 2025-08-26 PROCEDURE — 99282 EMERGENCY DEPT VISIT SF MDM: CPT

## 2025-08-26 ASSESSMENT — FIBROSIS 4 INDEX: FIB4 SCORE: 0.44

## 2025-08-28 ENCOUNTER — HOSPITAL ENCOUNTER (EMERGENCY)
Facility: MEDICAL CENTER | Age: 25
End: 2025-08-28
Attending: STUDENT IN AN ORGANIZED HEALTH CARE EDUCATION/TRAINING PROGRAM | Admitting: STUDENT IN AN ORGANIZED HEALTH CARE EDUCATION/TRAINING PROGRAM
Payer: COMMERCIAL

## 2025-08-28 VITALS
BODY MASS INDEX: 27.63 KG/M2 | WEIGHT: 156 LBS | SYSTOLIC BLOOD PRESSURE: 116 MMHG | HEART RATE: 93 BPM | TEMPERATURE: 97 F | RESPIRATION RATE: 16 BRPM | OXYGEN SATURATION: 97 % | DIASTOLIC BLOOD PRESSURE: 72 MMHG

## 2025-08-28 LAB
APPEARANCE UR: CLEAR
BACTERIA #/AREA URNS HPF: ABNORMAL /HPF
BILIRUB UR QL STRIP.AUTO: NEGATIVE
CASTS URNS QL MICRO: ABNORMAL /LPF (ref 0–2)
COLOR UR: YELLOW
EPITHELIAL CELLS 1715: ABNORMAL /HPF (ref 0–5)
GLUCOSE UR STRIP.AUTO-MCNC: NEGATIVE MG/DL
KETONES UR STRIP.AUTO-MCNC: NEGATIVE MG/DL
LEUKOCYTE ESTERASE UR QL STRIP.AUTO: NEGATIVE
MICRO URNS: ABNORMAL
NITRITE UR QL STRIP.AUTO: NEGATIVE
PH UR STRIP.AUTO: 6.5 [PH] (ref 5–8)
PROT UR QL STRIP: NEGATIVE MG/DL
RBC # URNS HPF: ABNORMAL /HPF (ref 0–2)
RBC UR QL AUTO: ABNORMAL
SP GR UR STRIP.AUTO: 1.01
UROBILINOGEN UR STRIP.AUTO-MCNC: 1 EU/DL
WBC #/AREA URNS HPF: ABNORMAL /HPF

## 2025-08-28 PROCEDURE — 87077 CULTURE AEROBIC IDENTIFY: CPT

## 2025-08-28 PROCEDURE — 99282 EMERGENCY DEPT VISIT SF MDM: CPT

## 2025-08-28 PROCEDURE — 302449 STATCHG TRIAGE ONLY (STATISTIC)

## 2025-08-28 PROCEDURE — 87086 URINE CULTURE/COLONY COUNT: CPT

## 2025-08-28 PROCEDURE — 81001 URINALYSIS AUTO W/SCOPE: CPT

## 2025-08-28 ASSESSMENT — FIBROSIS 4 INDEX: FIB4 SCORE: 0.44

## 2025-08-31 LAB
BACTERIA UR CULT: ABNORMAL
SIGNIFICANT IND 70042: ABNORMAL
SITE SITE: ABNORMAL
SOURCE SOURCE: ABNORMAL